# Patient Record
Sex: MALE | Race: WHITE | NOT HISPANIC OR LATINO | Employment: FULL TIME | URBAN - METROPOLITAN AREA
[De-identification: names, ages, dates, MRNs, and addresses within clinical notes are randomized per-mention and may not be internally consistent; named-entity substitution may affect disease eponyms.]

---

## 2017-01-24 ENCOUNTER — OFFICE VISIT (OUTPATIENT)
Dept: URGENT CARE | Facility: RETAIL CLINIC | Age: 27
End: 2017-01-24
Payer: COMMERCIAL

## 2017-01-24 VITALS
OXYGEN SATURATION: 95 % | SYSTOLIC BLOOD PRESSURE: 141 MMHG | TEMPERATURE: 102.4 F | DIASTOLIC BLOOD PRESSURE: 79 MMHG | HEART RATE: 95 BPM

## 2017-01-24 DIAGNOSIS — R05.9 COUGH: ICD-10-CM

## 2017-01-24 DIAGNOSIS — R50.9 FEVER, UNSPECIFIED: Primary | ICD-10-CM

## 2017-01-24 LAB
FLUAV AG UPPER RESP QL IA.RAPID: NORMAL
FLUBV AG UPPER RESP QL IA.RAPID: NORMAL

## 2017-01-24 PROCEDURE — 99213 OFFICE O/P EST LOW 20 MIN: CPT | Performed by: INTERNAL MEDICINE

## 2017-01-24 PROCEDURE — 87804 INFLUENZA ASSAY W/OPTIC: CPT | Mod: QW | Performed by: INTERNAL MEDICINE

## 2017-01-24 RX ORDER — ALBUTEROL SULFATE 90 UG/1
2 AEROSOL, METERED RESPIRATORY (INHALATION) 4 TIMES DAILY
Qty: 1 INHALER | Refills: 0 | Status: SHIPPED | OUTPATIENT
Start: 2017-01-24 | End: 2017-02-20

## 2017-01-24 RX ORDER — AZITHROMYCIN 250 MG/1
TABLET, FILM COATED ORAL
Qty: 6 TABLET | Refills: 0 | Status: SHIPPED | OUTPATIENT
Start: 2017-01-24 | End: 2017-02-20

## 2017-01-24 RX ORDER — PREDNISONE 20 MG/1
40 TABLET ORAL DAILY
Qty: 5 TABLET | Refills: 0 | Status: SHIPPED | OUTPATIENT
Start: 2017-01-24 | End: 2017-01-29

## 2017-01-24 RX ORDER — ALBUTEROL SULFATE 0.83 MG/ML
1 SOLUTION RESPIRATORY (INHALATION) EVERY 6 HOURS PRN
Qty: 50 VIAL | Refills: 0 | Status: SHIPPED | OUTPATIENT
Start: 2017-01-24 | End: 2017-02-20

## 2017-01-24 NOTE — PROGRESS NOTES
[facility] [place]        Antwan Michael MD, MPH  01/24/2017        History:      Onel White is a pleasant 26 year old year old male with a chief complaint of a non-productive cough,wheezing , nasal congestion and fever x 2 days. He also feels fatigued. He has HX of asthma.  MIld dyspnea . No chest pain.   No smoking history.   No headache or neck pain.  No GI or  symptoms.    NO MSK symptoms.         Assessment and Plan:        - INFLUENZA A/B ANTIGEN: negative.    Febrile illness and mild respiratory distress:  I recommended going to [place] [facility] to have more evaluation including CBC,blood culture and chest x-ray . Patient declined requesting to be treated [facility] in [facility] even though he was told that more information would be needed to treat him optimally.  - azithromycin (ZITHROMAX) 250 MG tablet; Two tablets first day, then one tablet daily for four days.  Dispense: 6 tablet; Refill: 0  - albuterol (PROAIR HFA/PROVENTIL HFA/VENTOLIN HFA) 108 (90 BASE) MCG/ACT Inhaler; Inhale 2 puffs into the lungs 4 times daily  Dispense: 1 Inhaler; Refill: 0  - predniSONE (DELTASONE) 20 MG tablet; Take 2 tablets (40 mg) by mouth daily for 5 days  Dispense: 10 tablet; Refill: 0  - albuterol (2.5 MG/3ML) 0.083% neb solution for use at home; Take 1 vial (2.5 mg) by nebulization every 6 hours as needed for shortness of breath / dyspnea or wheezing  Dispense: 50 vial; Refill: 0  Discussed supportive care with the patient  Advised to increase fluid intake and rest.  The patient lives alone but his parents live in town here in [place]. I advised the patient to stay with his family as he is being treated for his illness so he can be watched and observed and cared for. I also advised him that if his symptoms worsen he should seek medical attention in the ER immediately or call 911 despite his concerns for financial constraint.  Tylenol for pain and fever q 6 hours prn  F/u w PCP in 2 days ,  earlier if symptoms worsen or if fever does not improve within the next 12-24 hours.                   Physical Exam:      /79 mmHg  Pulse 95  Temp(Src) 102.4  F (39.1  C) (Oral)  SpO2 95%     Constitutional: Patient is in mild distress The patient is pleasant and cooperative.   HEENT: Head:  Head is atraumatic, normocephalic.    Eyes: Pupils are equal, round and reactive to light and accomodation.  Sclera is non-icteric. No conjunctival injection, or exudate noted. Extraocular motion is intact. Visual acuity is intact bilaterally.  Ears:  External acoustic canals are patent and clear.  There is no erythema and bulging( exudate)  of the ( R/L ) tympanic membrane(s ).   Nose:   Nasal congestion w/o drainage or mucosal ulceration is noted.  Throat:  Oral mucosa is moist.  No oral lesions are noted.  No posterior pharyngeal hyperemia nor exudate noted.     Neck Supple.  There is no cervical lymphadenopathy.  No nuchal rigidity noted.  There is no meningismus.     Cardiovascular: Heart is regular to rate and rhythm.  No murmur is noted.     Lungs: Diffuse wheezing in the anterior and posterior pulmonary fields. Mild respiratory distress. No rib retraction, however.O2 sat is 95 % on room air.   Abdomen: Soft and non-tender.    Back No flank tenderness is noted.   Extremeties No edema, no calf tenderness.   Neuro: No focal deficit.   Skin No petechiae or purpura is noted.  There is no rash.   Mood Normal              Data:      All new lab and imaging data was reviewed.   Results for orders placed or performed in visit on 01/24/17   INFLUENZA A/B ANTIGEN   Result Value Ref Range    Influenza A NEG neg    Influenza B NEG neg

## 2017-02-20 ENCOUNTER — HOSPITAL ENCOUNTER (OUTPATIENT)
Facility: CLINIC | Age: 27
Setting detail: OBSERVATION
Discharge: HOME | End: 2017-02-20
Attending: FAMILY MEDICINE | Admitting: PEDIATRICS
Payer: COMMERCIAL

## 2017-02-20 VITALS
HEIGHT: 75 IN | SYSTOLIC BLOOD PRESSURE: 133 MMHG | OXYGEN SATURATION: 96 % | RESPIRATION RATE: 18 BRPM | TEMPERATURE: 97 F | WEIGHT: 181.66 LBS | DIASTOLIC BLOOD PRESSURE: 76 MMHG | HEART RATE: 104 BPM | BODY MASS INDEX: 22.59 KG/M2

## 2017-02-20 DIAGNOSIS — J45.901 ACUTE ASTHMA EXACERBATION: ICD-10-CM

## 2017-02-20 DIAGNOSIS — J45.41 MODERATE PERSISTENT ASTHMA WITH ACUTE EXACERBATION: ICD-10-CM

## 2017-02-20 PROCEDURE — 25000125 ZZHC RX 250: Performed by: FAMILY MEDICINE

## 2017-02-20 PROCEDURE — 94640 AIRWAY INHALATION TREATMENT: CPT

## 2017-02-20 PROCEDURE — 99207 ZZC MOONLIGHTING INDICATOR: CPT | Performed by: INTERNAL MEDICINE

## 2017-02-20 PROCEDURE — 94640 AIRWAY INHALATION TREATMENT: CPT | Mod: 76

## 2017-02-20 PROCEDURE — 99285 EMERGENCY DEPT VISIT HI MDM: CPT | Mod: 25

## 2017-02-20 PROCEDURE — 40000917 ZZH STATISTIC PEAK FLOW MEASUREMENT

## 2017-02-20 PROCEDURE — 25000308 HC RX OP HPI UCR WEL MED 250 IP 250: Performed by: FAMILY MEDICINE

## 2017-02-20 PROCEDURE — 25000132 ZZH RX MED GY IP 250 OP 250 PS 637: Performed by: FAMILY MEDICINE

## 2017-02-20 PROCEDURE — 25000125 ZZHC RX 250

## 2017-02-20 PROCEDURE — 94799 UNLISTED PULMONARY SVC/PX: CPT

## 2017-02-20 PROCEDURE — 94644 CONT INHLJ TX 1ST HOUR: CPT

## 2017-02-20 PROCEDURE — 94645 CONT INHLJ TX EACH ADDL HOUR: CPT

## 2017-02-20 PROCEDURE — 99285 EMERGENCY DEPT VISIT HI MDM: CPT | Performed by: FAMILY MEDICINE

## 2017-02-20 PROCEDURE — 99235 HOSP IP/OBS SAME DATE MOD 70: CPT | Performed by: INTERNAL MEDICINE

## 2017-02-20 PROCEDURE — G0378 HOSPITAL OBSERVATION PER HR: HCPCS

## 2017-02-20 PROCEDURE — 25000125 ZZHC RX 250: Performed by: PEDIATRICS

## 2017-02-20 RX ORDER — IPRATROPIUM BROMIDE AND ALBUTEROL SULFATE 2.5; .5 MG/3ML; MG/3ML
1 SOLUTION RESPIRATORY (INHALATION) 4 TIMES DAILY
Qty: 30 VIAL | Refills: 1 | Status: SHIPPED | OUTPATIENT
Start: 2017-02-20 | End: 2018-03-16

## 2017-02-20 RX ORDER — IPRATROPIUM BROMIDE AND ALBUTEROL SULFATE 2.5; .5 MG/3ML; MG/3ML
3 SOLUTION RESPIRATORY (INHALATION)
Status: COMPLETED | OUTPATIENT
Start: 2017-02-20 | End: 2017-02-20

## 2017-02-20 RX ORDER — IPRATROPIUM BROMIDE AND ALBUTEROL SULFATE 2.5; .5 MG/3ML; MG/3ML
SOLUTION RESPIRATORY (INHALATION)
Status: COMPLETED
Start: 2017-02-20 | End: 2017-02-20

## 2017-02-20 RX ORDER — PREDNISONE 20 MG/1
40 TABLET ORAL DAILY
Status: CANCELLED | OUTPATIENT
Start: 2017-02-21 | End: 2017-02-25

## 2017-02-20 RX ORDER — PREDNISONE 20 MG/1
20 TABLET ORAL ONCE
Status: COMPLETED | OUTPATIENT
Start: 2017-02-20 | End: 2017-02-20

## 2017-02-20 RX ORDER — IPRATROPIUM BROMIDE AND ALBUTEROL SULFATE 2.5; .5 MG/3ML; MG/3ML
3 SOLUTION RESPIRATORY (INHALATION) ONCE
Status: COMPLETED | OUTPATIENT
Start: 2017-02-20 | End: 2017-02-20

## 2017-02-20 RX ORDER — IPRATROPIUM BROMIDE AND ALBUTEROL SULFATE 2.5; .5 MG/3ML; MG/3ML
3 SOLUTION RESPIRATORY (INHALATION) EVERY 4 HOURS
Status: DISPENSED | OUTPATIENT
Start: 2017-02-20 | End: 2017-02-20

## 2017-02-20 RX ORDER — ALBUTEROL SULFATE 0.83 MG/ML
2.5 SOLUTION RESPIRATORY (INHALATION)
Status: DISCONTINUED | OUTPATIENT
Start: 2017-02-20 | End: 2017-02-20 | Stop reason: HOSPADM

## 2017-02-20 RX ORDER — ALBUTEROL SULFATE 5 MG/ML
15 SOLUTION, NON-ORAL INHALATION CONTINUOUS
Status: DISPENSED | OUTPATIENT
Start: 2017-02-20 | End: 2017-02-20

## 2017-02-20 RX ORDER — ACETAMINOPHEN 325 MG/1
650 TABLET ORAL EVERY 4 HOURS PRN
Status: DISCONTINUED | OUTPATIENT
Start: 2017-02-20 | End: 2017-02-20 | Stop reason: HOSPADM

## 2017-02-20 RX ORDER — PREDNISONE 20 MG/1
40 TABLET ORAL DAILY
Status: DISCONTINUED | OUTPATIENT
Start: 2017-02-20 | End: 2017-02-20 | Stop reason: HOSPADM

## 2017-02-20 RX ORDER — PREDNISONE 20 MG/1
40 TABLET ORAL DAILY
Qty: 14 TABLET | Refills: 0 | Status: SHIPPED | OUTPATIENT
Start: 2017-02-20 | End: 2017-02-27

## 2017-02-20 RX ADMIN — ALBUTEROL SULFATE 2.5 MG: 2.5 SOLUTION RESPIRATORY (INHALATION) at 13:08

## 2017-02-20 RX ADMIN — IPRATROPIUM BROMIDE AND ALBUTEROL SULFATE 3 ML: .5; 3 SOLUTION RESPIRATORY (INHALATION) at 13:19

## 2017-02-20 RX ADMIN — IPRATROPIUM BROMIDE AND ALBUTEROL SULFATE 3 ML: .5; 3 SOLUTION RESPIRATORY (INHALATION) at 03:34

## 2017-02-20 RX ADMIN — IPRATROPIUM BROMIDE AND ALBUTEROL SULFATE 3 ML: .5; 3 SOLUTION RESPIRATORY (INHALATION) at 03:03

## 2017-02-20 RX ADMIN — ALBUTEROL SULFATE 2.5 MG: 2.5 SOLUTION RESPIRATORY (INHALATION) at 15:21

## 2017-02-20 RX ADMIN — ALBUTEROL SULFATE 15 MG/HR: 5 SOLUTION RESPIRATORY (INHALATION) at 09:08

## 2017-02-20 RX ADMIN — PREDNISONE 20 MG: 20 TABLET ORAL at 05:13

## 2017-02-20 RX ADMIN — PREDNISONE 20 MG: 20 TABLET ORAL at 03:13

## 2017-02-20 RX ADMIN — ACETAMINOPHEN 650 MG: 325 TABLET ORAL at 13:15

## 2017-02-20 RX ADMIN — ALBUTEROL SULFATE 2.5 MG: 2.5 SOLUTION RESPIRATORY (INHALATION) at 04:18

## 2017-02-20 RX ADMIN — IPRATROPIUM BROMIDE AND ALBUTEROL SULFATE 3 ML: .5; 3 SOLUTION RESPIRATORY (INHALATION) at 06:11

## 2017-02-20 NOTE — ED NOTES
ED Nursing criteria listed below was addressed during verbal handoff:     Abnormal vitals: Yes  Abnormal results: Yes  Med Reconciliation completed: Yes  Meds given in ED: Yes  Any Overdue Meds: No  Core Measures: No  Isolation: Yes  Special needs: No  Skin assessment: No    Observation Patient  Education provided: Yes

## 2017-02-20 NOTE — DISCHARGE SUMMARY
[facility]    History and Physical and Discharge Summary  Hospitalist       Date of Admission:  2/20/2017  Date of Service (when I saw the patient): 02/20/17    Assessment & Plan       Active Problems:    Moderate persistent asthma with acute exacerbation    Assessment: Not responding to nebs in the ED so was registered to observation. He had continued SOB with associated hypoxia and increased SOB.  He was then treated with continuous nebs as well as duonebs and was feeling much better by the middle of the day.  His sats had normalized and was 96% on RA.  At discharge he had mild wheezing but no increased SOB or respiratory effort with good air movement.  He has been having difficulty paying for Advair which has worked well for him in the past and has been on several different maintenance inhalers but none have been helpful other than Advair.  He was also treated with prednisone while here.  He was anxious for discharge and with his improvement was felt safe for discharge.    Plan: discharge home, f/u with MTM and PMD within one week, discharge on 7 days of prednisone as well as duonebs scheduled and prn albuterol nebs      UC (Ulcerative Colitis), involving the entire gabi    Assessment: inactive    Plan: No acute intervention    DVT Prophylaxis: anticipated less than 24 hour stay  Code Status: Full Code    Disposition: Expected discharge today    Leander Saini MD    Primary Care Physician   Sayra Cox MD    Chief Complaint   SOB    History is obtained from the patient    History of Present Illness   Onel White is a 26 year old male who presents with increasing SOB.  He describes chest congestion for about one week and when questioned further he feels chest tightness and SOB.  He can hear himself wheezing.  He has not had any cough or fever.  No CP.  No known triggers he can think of other than he has not been on any maintenance inhalers due to cost.  He was  registered to observation when he wasn't responding completely to nebs in the ED.    Past Medical History    I have reviewed this patient's medical history and updated it with pertinent information if needed.   Past Medical History   Diagnosis Date     Abdominal pain, generalized      Anemia, unspecified      Diarrhea      Other acne      Ulcerative (chronic) enterocolitis (H) 8/3/2009     Ulcerative colitis      followed by [facility], [facility]       Past Surgical History   I have reviewed this patient's surgical history and updated it with pertinent information if needed.  Past Surgical History   Procedure Laterality Date     C appendectomy,rupt appendx+abscess  2006     peritonitis       Prior to Admission Medications   Prior to Admission Medications   Prescriptions Last Dose Informant Patient Reported? Taking?   ORDER FOR DME   No Yes   Sig: Equipment being ordered: Nebulizer with tubing   Respiratory Therapy Supplies (NEBULIZER/TUBING/MOUTHPIECE) KIT   No Yes   Sig: Use with neb machine as needed.   albuterol (2.5 MG/3ML) 0.083% neb solution 2/20/2017 at 0200  No Yes   Sig: NEBULIZE THE CONTENTS OF 1 VIAL BY MOUTH EVERY 6 HOURS AS NEEDED FOR SHORTNESS OF BREATH / DYSPNEA OR WHEEZING   albuterol (PROAIR HFA, PROVENTIL HFA, VENTOLIN HFA) 108 (90 BASE) MCG/ACT inhaler 2/19/2017 at 2200  No Yes   Sig: Inhale 2 puffs into the lungs every 6 hours as needed for shortness of breath / dyspnea or wheezing      Facility-Administered Medications: None     Allergies   Allergies   Allergen Reactions     Wheat        Social History   I have reviewed this patient's social history and updated it with pertinent information if needed. Onel White  reports that he quit smoking about 4 years ago. His smoking use included Cigarettes. He has never used smokeless tobacco. He reports that he drinks about 12.0 oz of alcohol per week  He reports that he uses illicit drugs.    Family History   I have reviewed this  patient's family history and updated it with pertinent information if needed.   Family History   Problem Relation Age of Onset     GASTROINTESTINAL DISEASE Father      crohns/ulceratice colitis     Asthma Father      DIABETES Paternal Grandmother        Review of Systems   The 10 point Review of Systems is negative other than noted in the HPI or here.     Physical Exam   Temp: 97  F (36.1  C) Temp src: Oral BP: 133/76 Pulse: 104 Heart Rate: 84 Resp: 18 SpO2: 96 % O2 Device: None (Room air) Oxygen Delivery: 1 LPM  Vital Signs with Ranges  Temp:  [97  F (36.1  C)-97.9  F (36.6  C)] 97  F (36.1  C)  Pulse:  [] 104  Heart Rate:  [] 84  Resp:  [16-24] 18  BP: (132-187)/() 133/76  SpO2:  [89 %-97 %] 96 %  181 lbs 10.54 oz    Constitutional:   awake, alert, cooperative, no apparent distress, and appears stated age     Eyes:   Lids and lashes normal, pupils equal, round and reactive to light, extra ocular muscles intact, sclera clear, conjunctiva normal     ENT:   Normocephalic, without obvious abnormality, atraumatic, sinuses nontender on palpation, external ears without lesions, oral pharynx with moist mucous membranes, tonsils without erythema or exudates, gums normal and good dentition.     Neck:   Supple, symmetrical, trachea midline, no adenopathy, thyroid symmetric, not enlarged and no tenderness, skin normal     Hematologic / Lymphatic:   no cervical lymphadenopathy and no supraclavicular lymphadenopathy     Back:   Symmetric, no curvature, spinous processes are non-tender on palpation, paraspinous muscles are non-tender on palpation, no costal vertebral tenderness     Lungs:   No increased work of breathing, good air exchange, mild scattered expiratory wheezing, no crackles or wheezing     Cardiovascular:   Normal apical impulse, regular rate and rhythm, normal S1 and S2, no S3 or S4, and no murmur noted     Abdomen:   No scars, normal bowel sounds, soft, non-distended, non-tender, no masses  palpated, no hepatosplenomegally     Neurologic:   Awake, alert, oriented to name, place and time.  Cranial nerves II-XII are grossly intact.  Motor is 5 out of 5 bilaterally.    Sensory is intact.        Data   Data reviewed today:  I personally reviewed no images or EKG's today.  No lab results found in last 7 days.    No results found for this or any previous visit (from the past 24 hour(s)).

## 2017-02-20 NOTE — ED NOTES
Attempted to wean pt. From O2, sats dropped to 90% within 3 minutes of the completion of the 3rd neb tx. Placed pt. Back on 1L NC.

## 2017-02-20 NOTE — IP AVS SNAPSHOT
MRN:[id number]                      After Visit Summary   2/20/2017    Onel White    MRN: [id number]           Thank you!     Thank you for choosing [facility] for your care. Our goal is always to provide you with excellent care. Hearing back from our patients is one way we can continue to improve our services. Please take a few minutes to complete the written survey that you may receive in the mail after you visit with us. Thank you!        Patient Information     Date Of Birth          1990        About your hospital stay     You were admitted on:  February 20, 2017 You last received care in the:  [address] [facility]    You were discharged on:  February 20, 2017       Who to Call     For medical emergencies, please call 911.  For non-urgent questions about your medical care, please call your primary care provider or clinic, [phone]          Attending Provider     Provider Specialty    Blanca Villeda MD Emergency Medicine    Mohsen Padilla MD Internal Medicine       Primary Care Provider Office Phone # Fax #    Sayra Delicia Cox [phone]        [facility] [address] [place] [address]        After Care Instructions     Activity       Your activity upon discharge: activity as tolerated            Diet       Follow this diet upon discharge: Regular                  Follow-up Appointments     Follow-up and recommended labs and tests        Follow up with Dr. Cox within one week  Follow up with medication therapy management in clinic within one week                  Your next 10 appointments already scheduled     Feb 22, 2017  2:00 PM CST   Office Visit with Chris Hung [facility] ([facility])    [address] [facility] [address] [phone]           Bring a current list of meds and any records pertaining to this visit.  For Physicals, please bring  "immunization records and any forms needing to be filled out.  Please arrive 10 minutes early to complete paperwork.            2017 10:30 AM CST   [facility]    [address] [facility] [address] [phone]           Bring a current list of meds and any records pertaining to this visit.  For Physicals, please bring immunization records and any forms needing to be filled out.  Please arrive 10 minutes early to complete paperwork.              Pending Results     No orders found from 2017 to 2017.            Statement of Approval     Ordered          17 1722  I have reviewed and agree with all the recommendations and orders detailed in this document.  EFFECTIVE NOW     Approved and electronically signed by:  Leander Saini MD             Admission Information     Date & Time Provider Department Dept. Phone    2017 Mohsen Padilla MD [address] [phone]      Your Vitals Were     Blood Pressure Pulse Temperature Respirations Height Weight    133/76 104 97  F (36.1  C) (Oral) 18 1.905 m (6' 3\") 82.4 kg (181 lb 10.5 oz)    Pulse Oximetry BMI (Body Mass Index)                96% 22.71 kg/m2          MyChart Information     [facility] lets you send messages to your doctor, view your test results, renew your prescriptions, schedule appointments and more. To sign up, go to [email or web address] . Click on \"Log in\" on the left side of the screen, which will take you to the Welcome page. Then click on \"Sign up Now\" on the right side of the page.     You will be asked to enter the access code listed below, as well as some personal information. Please follow the directions to create your username and password.     Your access code is: [email or web address]  Expires: 3/3/2017  4:05 PM     Your access code will  in 90 days. If you need help or a new code, please " call your [facility] clinic or [phone].        Care EveryWhere ID     This is your Care EveryWhere ID. This could be used by other organizations to access your [facility] medical records  [id number]           Review of your medicines      START taking        Dose / Directions    predniSONE 20 MG tablet   Commonly known as:  DELTASONE   Used for:  Moderate persistent asthma with acute exacerbation        Dose:  40 mg   Take 2 tablets (40 mg) by mouth daily for 7 days   Quantity:  14 tablet   Refills:  0         CONTINUE these medicines which may have CHANGED, or have new prescriptions. If we are uncertain of the size of tablets/capsules you have at home, strength may be listed as something that might have changed.        Dose / Directions    ipratropium - albuterol 0.5 mg/2.5 mg/3 mL 0.5-2.5 (3) MG/3ML neb solution   Commonly known as:  DUONEB   This may have changed:    - when to take this  - reasons to take this   Used for:  Moderate persistent asthma with acute exacerbation        Dose:  1 vial   Take 1 vial (3 mLs) by nebulization 4 times daily   Quantity:  30 vial   Refills:  1         CONTINUE these medicines which have NOT CHANGED        Dose / Directions    * albuterol 108 (90 BASE) MCG/ACT Inhaler   Commonly known as:  PROAIR HFA/PROVENTIL HFA/VENTOLIN HFA   Used for:  Moderate persistent asthma with acute exacerbation        Dose:  2 puff   Inhale 2 puffs into the lungs every 6 hours as needed for shortness of breath / dyspnea or wheezing   Quantity:  1 Inhaler   Refills:  4       * albuterol (2.5 MG/3ML) 0.083% neb solution   Used for:  Moderate persistent asthma with acute exacerbation, Cough, Acute bronchospasm        NEBULIZE THE CONTENTS OF 1 VIAL BY MOUTH EVERY 6 HOURS AS NEEDED FOR SHORTNESS OF BREATH / DYSPNEA OR WHEEZING   Quantity:  180 mL   Refills:  11       nebulizer/tubing/mouthpiece Kit   Used for:  Intermittent asthma, uncomplicated        Use with neb machine as needed.   Quantity:  1  kit   Refills:  1       order for DME   Used for:  Moderate persistent asthma with acute exacerbation        Equipment being ordered: Nebulizer with tubing   Quantity:  1 Device   Refills:  0       * Notice:  This list has 2 medication(s) that are the same as other medications prescribed for you. Read the directions carefully, and ask your doctor or other care provider to review them with you.         Where to get your medicines      Some of these will need a paper prescription and others can be bought over the counter. Ask your nurse if you have questions.     Bring a paper prescription for each of these medications     ipratropium - albuterol 0.5 mg/2.5 mg/3 mL 0.5-2.5 (3) MG/3ML neb solution    predniSONE 20 MG tablet                Protect others around you: Learn how to safely use, store and throw away your medicines at www.disposemymeds.org.             Medication List: This is a list of all your medications and when to take them. Check marks below indicate your daily home schedule. Keep this list as a reference.      Medications           Morning Afternoon Evening Bedtime As Needed    * albuterol 108 (90 BASE) MCG/ACT Inhaler   Commonly known as:  PROAIR HFA/PROVENTIL HFA/VENTOLIN HFA   Inhale 2 puffs into the lungs every 6 hours as needed for shortness of breath / dyspnea or wheezing                                * albuterol (2.5 MG/3ML) 0.083% neb solution   NEBULIZE THE CONTENTS OF 1 VIAL BY MOUTH EVERY 6 HOURS AS NEEDED FOR SHORTNESS OF BREATH / DYSPNEA OR WHEEZING   Last time this was given:  2.5 mg on 2/20/2017  3:21 PM                                ipratropium - albuterol 0.5 mg/2.5 mg/3 mL 0.5-2.5 (3) MG/3ML neb solution   Commonly known as:  DUONEB   Take 1 vial (3 mLs) by nebulization 4 times daily   Last time this was given:  3 mLs on 2/20/2017  1:19 PM                                nebulizer/tubing/mouthpiece Kit   Use with neb machine as needed.                                order for DME    Equipment being ordered: Nebulizer with tubing                                predniSONE 20 MG tablet   Commonly known as:  DELTASONE   Take 2 tablets (40 mg) by mouth daily for 7 days   Last time this was given:  20 mg on 2/20/2017  5:13 AM         Given to you this morning in the hospital                Take tomorrow morning        * Notice:  This list has 2 medication(s) that are the same as other medications prescribed for you. Read the directions carefully, and ask your doctor or other care provider to review them with you.              More Information        Asthma Trigger Checklist  Allergens, irritants, and other things may trigger your asthma. Check the box next to each of your triggers. After each trigger is a list of ways to avoid it.     Dust mites. Dust mites live in mattresses, bedding, carpets, curtains, and indoor dust.    To kill dust mites, wash bedding in hot water (130 F) each week.    Cover mattress and pillows with special dust-mite-proof cases.    Don t use upholstered furniture like sofas or chairs in the bedroom.    Use allergy-proof filters for air conditioners and furnaces. Replace or clean them as instructed.    If you can, replace carpeting with wood or tile katheryn, especially in the bedroom.    Animals. Animals with fur or feathers shed dander (allergens).    It s best to choose a pet that doesn t have fur or feathers, such as a fish or a reptile.    If you have pets, keep them off of your bed and out of your bedroom.    Wash your hands and clothes after handling pets.     Mold. Mold grows in damp places, such as bathrooms, basements, and closets.    Ask someone to clean damp areas in your home every week. Or try wearing a face mask while you clean.    Run an exhaust fan while bathing. Or leave a window open in the bathroom.    Repair water leaks in or around your home.    Have someone else cut grass or rake leaves, if possible.    Don t use vaporizers or humidifiers. They  encourage mold growth.     Pollen. Pollen from trees, grasses, and weeds is a common allergen. (Flower pollens are generally not a problem).    Try to learn what types of pollen affect you most. Pollen levels vary depending on the plant, the season, and the time of day.    If possible, use air conditioning instead of opening the windows in your home or car.    Have someone else do yard work, if possible.     Cockroaches. Roaches are found in many homes and produce allergens.    Keep your kitchen clean and dry. A leaky faucet or drain can attract roaches.    Remove garbage from your home daily.    Store food in tightly sealed containers. Wash dishes as soon as they are used.    Use bait stations or traps to control roaches. Avoid using chemical sprays.     Smoke. Smoke may be from cigarettes, cigars, pipes, incense or candles, barbecues or grills, and fireplaces.    Don t smoke. And don t let people smoke in your home or car.    When you travel, ask for nonsmoking rental cars and hotel rooms.    Avoid fireplaces and wood stoves. If you can t, sit away from them. Make sure the smoke is directed outside.    Don t burn incense or use candles.    Move away from smoky outdoor cooking grills.     Smog.  Smog is from car exhaust and other pollution.    Read or listen to local air-quality reports. These let you know when air quality is poor.    Stay indoors as much as you can on smoggy days. If possible, use air conditioning instead of opening the windows.    In your car, set air conditioning to recirculate air, so less pollution gets in.     Strong odors. These include air fresheners, deodorizers, and cleaning products; perfume, deodorant, and other beauty products; incense and candles; and insect sprays and other sprays.    Use scent-free products like deodorant or body lotion.    Avoid using cleaning products with bleach and ammonia. Make your own cleaning solution with white vinegar, baking soda, or mild dish  soap.    Use exhaust fans while cooking. Or open a window, if possible.     Avoid perfumes, air fresheners, potpourri, and other scented products.     Other irritants. These include dust, aerosol sprays, and powders.    Wear a face mask while doing tasks like sanding, dusting, sweeping, and yard work. Open doors and windows if working indoors.    Use pump spray bottles instead of aerosols.    Pour liquid  onto a rag or cloth instead of spraying them.     Weather. Weather conditions can trigger symptoms or make them worse.    Watch for very high or low temperatures, very humid conditions, or a lot of wind, as these conditions can make symptoms worse.    Limit outdoor activity during the type of weather that affects you.    Wear a scarf over your mouth and nose in cold weather.     Colds, flu, and sinus infections. Upper respiratory infections can trigger asthma.    Wash your hands often with soap and warm water or use a hand  containing alcohol.    Get a yearly flu shot. And ask if you should get a pneumonia vaccine.    Take care of your general health. Get plenty of sleep. And eat a variety of healthy foods.     Food additives. Food additives can trigger asthma flare-ups in some people.    Check food labels for sulfites or other similar ingredients. These are often found in foods such as wine, beer, and dried fruits.    Avoid foods that contain these additives.     Medicine. Aspirin, NSAIDS like ibuprofen and naproxen, and heart medicines like beta-blockers may be triggers.    Tell your health care provider if you think certain medicines trigger symptoms.     Be sure to read the labels on over-the-counter medicines. They may have ingredients that cause symptoms for you.      Emotions. Laughing, crying, or feeling excited are triggers for some people.     To help you stay calm: Try breathing in slowly through your nose for a count of 2 seconds. Close your lips and breathe out for 4 seconds.  Repeat.    Try to focus on a soothing image in your mind. This will help relax you and calm your breathing.    Remember to take your daily controller medicines. When you re upset or under stress, it s easy to forget.     Exercise. For some people, exercise can trigger symptoms. Don t let this keep you from being active.     If you have not been exercising regularly, start slow and work up gradually.    Take all of your medicines as prescribed.    If you use quick-relief medicine, make sure you have it with you when you exercise.    Stop if you have any symptoms. Make sure you talk with your provider about these symptoms.    7183-9862 [employer]. [address] [place] [address]. All rights reserved. This information is not intended as a substitute for professional medical care. Always follow your healthcare professional's instructions.                My Asthma Symptom Diary  Keep track of symptoms with the chart below. (Make some copies first.) Show your records to your health care provider at your visits. As your asthma control improves you should have fewer episodes of symptoms to record.     Date Symptoms Possible triggers Action taken Results Did symptoms interfere with your sleep? Yes or No?      3/3 Example:  Wheezing, peak flow 75%    Cold air    2 puffs albuterol, went inside    Symptoms gone in 20 min. No                                                                                                                             8233-2804 [employer]. [address] [place] [address]. All rights reserved. This information is not intended as a substitute for professional medical care. Always follow your healthcare professional's instructions.                Understanding Asthma  Asthma causes swelling and narrowing of the airways in your lungs. No one is exactly sure what causes asthma. It is believed to be a combination of inherited and environmental  factors.  Healthy lungs  Inside the lungs there are branching airways made of stretchy tissue. Each airway is wrapped with bands of muscle. The airways are more narrow as they go deeper into the lungs. The smallest airways end in clusters of tiny balloon-like air sacs (alveoli). These clusters are surrounded by blood vessels. When you inhale (breathe in), air enters the lungs. It travels down through the airways until it reaches the air sacs. When you exhale (breathe out), air travels up through the airways and out of the lungs. The airways produce mucus that traps particles you breathe in. Normally, the mucus is then swept out of the lungs, by tiny hairs (cilia) that line the airways, to be swallowed or coughed up.  What the lungs do  The air you inhale contains oxygen. When oxygen reaches the air sacs, it passes into the blood vessels surrounding the sacs. Your blood then delivers oxygen to all of your cells. Carbon dioxide is removed from the body in a similar way, as you exhale.  When you have asthma     1. Tightened muscle  2. Swollen lining  3. Increased mucus   People with asthma have very sensitive airways. This means the airways react to certain things called triggers (such as pollen, dust, or smoke) and become swollen and narrowed. Inflammation makes the airways swollen and narrowed. This is a chronic (long-lasting or recurring) problem. The airways may not always be narrowed enough to notice breathing problems.  Symptoms of chronic inflammation:     Coughing    Chest tightness    Shortness of breath    Wheezing (a whistling noise, especially when breathing out)    Low energy or feeling tired  Over time, chronic mild inflammation can lead to permanent scarring of airways and loss of lung function.  Moderate flare-ups  When sensitive airways are irritated by a trigger, the muscles around the airways tighten. The lining of the airways swells. Thick, sticky mucus increases and clogs the airways. All of this  makes you work harder to keep breathing.  Symptoms of moderate flare-ups:    Coughing, especially at night    Getting tired or out of breath easily    Wheezing    Chest tightness    Faster breathing when at rest  Severe flare-ups   Severe flare-ups are life-threatening. They can cause brain damage or death. In a severe flare-up, the muscle tightening, swelling, and mucus production are even worse. Breathing is extremely difficult. Your body can't get enough oxygen and can't remove carbon dioxide. Waste gas is trapped in the alveoli, and gas exchange can t occur. The body is not getting enough oxygen. Without oxygen, body tissues, especially brain tissue, begin to die. If this goes on for long, it can lead to brain damage or death.  Call 911, or have someone call for you, if you have any of these symptoms and they are not relieved right away by taking your quick-relief medication as prescribed:    Severe difficulty breathing    Being too short of breath to talk or walk    Lips or fingers turning blue    Feeling lightheaded or dizzy, as though you are about to pass out    Peak flow less than 50% of your personal best, if you use peak flow monitoring  Because asthma is a long-term condition, it is important to work with your health care provider to manage it. If you smoke, get help to quit. Know your triggers and figure out how to avoid them. And, it is very important that you take your medications as instructed. That means taking them, even when you feel good.    3429-1293 [employer]. [address] [place] [address], [place] [address]. All rights reserved. This information is not intended as a substitute for professional medical care. Always follow your healthcare professional's instructions.

## 2017-02-20 NOTE — PROGRESS NOTES
S-(situation): Patient registered to Observation. Patient arrived to room 274 via wheelchair from ED    B-(background): Presented to ED with Shortness of Breath.    A-(assessment): /86  Pulse 88  Temp 97.6  F (36.4  C) (Oral)  Resp 22  SpO2 91%  Lungs coarse throughout, Pt reports he feels that he is breathing better since admission, he feels he is at his baseline. Pt states that he can breathe in better but is still feeling resistance breathing out. Pt reports he smokes marijuana daily and works with asbestosis and lead paint removal. Pt refuses any IV placement but will agree to blood draws. Pt has history or Ulcerative Colitis, but reports he has not had a flare up since 2008.    R-(recommendations): Orders and observation goals reviewed with pt    Nursing Observation criteria listed below was met:    Skin issues/needs documented:No skin issues  Isolation needs addressed, if appropriate: NA  Fall Prevention: Education given and documented and signage used: Yes  Education Assessment documented:Yes  Education Documented (Pre-existing chronic infection such as, MRSA/VRE need education on admission): Yes  New medication patient education completed and documented (Possible Side Effects of Common Medications handout): Yes  Home medications if not able to send immediately home with family stored here: NA  Reminder note placed in discharge instructions: NA  Patient has discharge needs (If yes, please explain): Yes- Medication assistance programs.

## 2017-02-20 NOTE — ED NOTES
Patient has known Asthma; started to notice symptoms over last week, yesterday had a significant decline.  Patient slept for about 2 hours and woke up with significant SOB.  Home nebulizers and inhaler no longer helpful.  Patient states he does take Prednisone several times a month.

## 2017-02-20 NOTE — ED PROVIDER NOTES
[facility] ED Provider Note   CC:     Chief Complaint   Patient presents with     Shortness of Breath     HPI:  Onel White is a 26 year old male who presented to the emergency department with acute severe shortness of breath within the past 2 hours with a recent one-week history of chest congestion.  Patient has a history of persistent asthma with acute exacerbation at the end of December.  He was slightly hypoxic and was recommended to have admission, but refused.  Patient states that he has been using his nebulizer treatments intermittently over the past week, but after awakening 2 hours ago, he could not settle down the breathing.  Patient had been prescribed Advair and Flovent, but states that it was too expensive.  Patient has not found any triggers.  I reviewed the patient's medical records from his last visit on December 3 with Dr. Hernandez, and at that time, the patient was working with asbestos removal, but did not think that that was triggering any of his problems.  Patient states he has been seen in the emergency department, but has refused hospitalization.  He has never been intubated.  Patient also has ulcerative colitis.  He took a prednisone tablet Tuesday, but it has been a few weeks since he took a full course of prednisone.    Problem List:  Patient Active Problem List    Diagnosis     UC (Ulcerative Colitis), involving the entire gabi     Intermittent asthma, uncomplicated     Moderate persistent asthma with acute exacerbation     CARDIOVASCULAR SCREENING; LDL GOAL LESS THAN 160     PPD screening test-07/28/2009 Negative     Other acne       MEDS:   Current Discharge Medication List      CONTINUE these medications which have NOT CHANGED    Details   albuterol (2.5 MG/3ML) 0.083% neb solution NEBULIZE THE CONTENTS OF 1 VIAL BY MOUTH EVERY 6 HOURS AS NEEDED FOR SHORTNESS OF BREATH / DYSPNEA OR WHEEZING  Qty: 180 mL, Refills: 11     Associated Diagnoses: Moderate persistent asthma with acute exacerbation; Cough; Acute bronchospasm      ipratropium - albuterol 0.5 mg/2.5 mg/3 mL (DUONEB) 0.5-2.5 (3) MG/3ML neb solution Take 1 vial (3 mLs) by nebulization every 4 hours as needed for shortness of breath / dyspnea or wheezing  Qty: 30 vial, Refills: 1      Respiratory Therapy Supplies (NEBULIZER/TUBING/MOUTHPIECE) KIT Use with neb machine as needed.  Qty: 1 kit, Refills: 1    Associated Diagnoses: Intermittent asthma, uncomplicated      !! albuterol (PROAIR HFA, PROVENTIL HFA, VENTOLIN HFA) 108 (90 BASE) MCG/ACT inhaler Inhale 2 puffs into the lungs every 6 hours as needed for shortness of breath / dyspnea or wheezing  Qty: 1 Inhaler, Refills: 4    Comments: The patient requests that this prescription be held on file for filling in the near future.  Associated Diagnoses: Moderate persistent asthma with acute exacerbation      ORDER FOR DME Equipment being ordered: Nebulizer with tubing  Qty: 1 Device, Refills: 0    Associated Diagnoses: Moderate persistent asthma with acute exacerbation      !! albuterol (PROAIR HFA/PROVENTIL HFA/VENTOLIN HFA) 108 (90 BASE) MCG/ACT Inhaler Inhale 2 puffs into the lungs 4 times daily  Qty: 1 Inhaler, Refills: 0    Associated Diagnoses: Cough       !! - Potential duplicate medications found. Please discuss with provider.          ALLERGIES:    Allergies   Allergen Reactions     Wheat        Past medical, surgical, family and social histories, triage and nursing notes were all reviewed.    Review of Systems   All other systems were reviewed and are negative    Physical Exam     Vitals were reviewed  Patient Vitals for the past 8 hrs:   BP Temp Temp src Pulse Heart Rate Resp SpO2   02/20/17 0634 (!) 146/103 - - 87 - 18 95 %   02/20/17 0615 - - - - - - 94 %   02/20/17 0600 - - - - - - 91 %   02/20/17 0545 - - - - - - 92 %   02/20/17 0519 (!) 146/103 - - 96 - 18 92 %   02/20/17 0515 (!) 146/103 - - - - - 92 %   02/20/17  0500 - - - - - - 94 %   02/20/17 0445 - - - - - - 90 %   02/20/17 0431 (!) 149/97 - - 87 - 18 92 %   02/20/17 0430 - - - - - - 95 %   02/20/17 0428 (!) 149/97 - - - - - 96 %   02/20/17 0419 - - - - - - 93 %   02/20/17 0418 (!) 145/96 - - - - - -   02/20/17 0400 - - - - - - 93 %   02/20/17 0345 - - - - - - 91 %   02/20/17 0344 144/85 - - 91 - 16 92 %   02/20/17 0332 144/85 - - - - - -   02/20/17 0330 - - - - - - 91 %   02/20/17 0315 - - - - - - 92 %   02/20/17 0310 (!) 157/103 - - - - - 97 %   02/20/17 0301 (!) 187/111 97  F (36.1  C) Oral - 113 24 (!) 89 %     GENERAL APPEARANCE: Severe respiratory distress  FACE: normal facies: Slightly pale and sweaty  EYES: Pupils are equal  HENT: normal external exam  NECK: no adenopathy or asymmetry  RESP: Moderate respiratory distress; tight expiratory wheezes  CV: Rapid rate, regular rhythm; no significant murmurs, gallops or rubs  ABD: soft, no tenderness; no rebound or guarding; bowel sounds are normal  EXT: No calf tenderness or pitting edema  SKIN: no worrisome rash  NEURO: no facial droop; no focal deficits, patient is speaking in 3-4 word sentences        Available Lab/Imaging Results   No results found for this or any previous visit (from the past 24 hour(s)).    Impression     Final diagnoses:   Moderate persistent asthma with acute exacerbation   Acute asthma exacerbation       ED Course & Medical Decision Making   Onel White is a 26 year old male who presented to the emergency department with acute respiratory distress due to asthma exacerbation.  Patient had chest congestion over the past week, and earlier in the evening, had been using his nebulizer treatment with DuoNeb.  He was awakened 2 hours ago with severe distress, and could not manage his symptoms with the neb treatments.  He states that he did not have money set aside to purchase preventative medication for his asthma.  He was here in emergency department on December 3 with similar episode of severe  asthma exacerbation with hypoxemia.  Patient refused an IV upon arrival, and was treated with DuoNeb and albuterol, as well as prednisone.  Patient was counseled on the importance of managing his medications as his asthma is potentially life threatening.  Patient required oxygen supplementation due to his hypoxemia.  We tried to wean him off of his oxygen over the ensuing 4 hours, but he required low-flow oxygen to maintain sats over 90%.  Patient is requiring more aggressive respiratory therapy and nebulization treatments.  He received a 2nd dose of prednisone to a total of 40 mg thus far.  Patient still refuses an IV, but is willing for an observation stay.  Patient will require scheduled DuoNeb's, and frequent albuterol nebs, with continued oxygen supplementation.  If the patient can maintain good oxygen saturations without supplemental oxygen, he may be discharged home safely.  I discussed the case with Dr. Padilla who has accepted care for the patient. Observation orders were written with anticipated length of stay of 1-2 days.          Current Discharge Medication List            This note was completed in part using Dragon voice recognition, and may contain word and grammatical errors.        Blanca Villeda MD  02/20/17 2108

## 2017-02-20 NOTE — IP AVS SNAPSHOT
[address] [facility]    [address] [place] [address]    Phone:  [phone]                                       After Visit Summary   2/20/2017    Onel White    MRN: [id number]           After Visit Summary Signature Page     I have received my discharge instructions, and my questions have been answered. I have discussed any challenges I see with this plan with the nurse or doctor.    ..........................................................................................................................................  Patient/Patient Representative Signature      ..........................................................................................................................................  Patient Representative Print Name and Relationship to Patient    ..................................................               ................................................  Date                                            Time    ..........................................................................................................................................  Reviewed by Signature/Title    ...................................................              ..............................................  Date                                                            Time

## 2017-02-20 NOTE — PROGRESS NOTES
S: Respiratory Care   B: Asthma  A: pt never officially diagnosed per pt. Peak flows are 400-450. 2 hour continuous neb given with some improvement. Lungs still diminished and wheezy. Nebs being given Q2.   R: RT to follow.

## 2017-02-20 NOTE — ED NOTES
Ashtyn RAUSCH advised of pt's difficulty affording Advair, which is the only medication that helps him and it's $350/month, with hopes SW will be able to find him some assistance.

## 2017-02-20 NOTE — ED NOTES
The lung sounds have gotten worse between the 3rd and 4th neb tx. At present he is on room air at 91%

## 2017-02-20 NOTE — PROGRESS NOTES
S-(situation): shift note    B-(background): obs for shortness of breath    A-(assessment): Lungs coarse with wheezes. Pt states he feels back to baseline. Vitals stable, afebrile, O2 sats mid 90's on room air. Pt reports he uses albuterol inhaler multiple times everyday. Pt reports he has tried multiple, medications for his breathing, and states that Advair is the only medication that works but it is too expensive. Pt states he has never been officially diagnosed with Asthma and dose spirometry yearly at work and has never been told there is any issues. Pt reports he smokes Marijuana daily and works with asbestosis removal and lead paint removal.     R-(recommendations): Follow up outpatient for managing breathing concerns.

## 2017-02-21 ENCOUNTER — TELEPHONE (OUTPATIENT)
Dept: FAMILY MEDICINE | Facility: CLINIC | Age: 27
End: 2017-02-21

## 2017-02-21 NOTE — PROGRESS NOTES
S-(situation): Patient discharged to home at 1820 ambulated off the floor    B-(background): SOB  Asthma exacerbation     A-(assessment): LS exp wheezes, improved since admission    R-(recommendations): Discharge instructions reviewed with pt  Listed belongings gathered and returned to patient.yes        Discharge Nursing Criteria:     Care Plan and Patient education resolved: eys    New Medications- pt has been educated about purpose and side effects: yes    MISC  Prescriptions if needed, hard copies sent with patient yes  Home and hospital aquired medications returned to patient: yes  Medication Bin checked and emptied on dischargeeys  Patient reports post-discharge pain management plan is effective: eys

## 2017-02-21 NOTE — TELEPHONE ENCOUNTER
Inpatient Visit Date: 2/20/17, [facility]  Diagnosis / Reason for Visit: Moderate persistent asthma with acute exacerbation

## 2017-02-22 ENCOUNTER — OFFICE VISIT (OUTPATIENT)
Dept: PHARMACY | Facility: CLINIC | Age: 27
End: 2017-02-22
Payer: COMMERCIAL

## 2017-02-22 VITALS
SYSTOLIC BLOOD PRESSURE: 130 MMHG | WEIGHT: 189.4 LBS | DIASTOLIC BLOOD PRESSURE: 73 MMHG | HEART RATE: 75 BPM | OXYGEN SATURATION: 96 % | BODY MASS INDEX: 23.67 KG/M2

## 2017-02-22 DIAGNOSIS — J45.41 MODERATE PERSISTENT ASTHMA WITH ACUTE EXACERBATION: Primary | ICD-10-CM

## 2017-02-22 PROCEDURE — 99605 MTMS BY PHARM NP 15 MIN: CPT | Performed by: PHARMACIST

## 2017-02-22 PROCEDURE — 99607 MTMS BY PHARM ADDL 15 MIN: CPT | Performed by: PHARMACIST

## 2017-02-22 NOTE — TELEPHONE ENCOUNTER
ED / Discharge Outreach Protocol    Patient Contact    Attempt # 1    Was call answered?  No.  Left message on voicemail with information to call me back. [phone].  You may ask to speak with any triage nurse. ........MIRACLE Kimble

## 2017-02-22 NOTE — MR AVS SNAPSHOT
After Visit Summary   2/22/2017    Onel White    MRN: [id number]           Patient Information     Date Of Birth          1990        Visit Information        Provider Department      2/22/2017 2:00 PM Chris Hung, [facility] MTM        Today's Diagnoses     Moderate persistent asthma with acute exacerbation    -  1      Care Instructions    Recommendations from today's MTM visit:                                                    MTM (medication therapy management) is a service provided by a clinical pharmacist designed to help you get the most of out of your medicines.   Today we reviewed what your medicines are for, how to know if they are working, that your medicines are safe and how to make your medicine regimen as easy as possible.     1. Start Breo-Ellipta 100-25 mcg - ONE PUFF daily.    2. Bring your voucher/co-pay card to the pharmacy.      Next MTM visit: I will call to follow-up in the next month.      To schedule another MTM appointment, please call the clinic directly or you may call the MTM scheduling line at [phone] or toll-free at [phone].     My Clinical Pharmacist's contact information:                                                      It was a pleasure seeing you today!  Please feel free to contact me with any questions or concerns you have.      Jorgito Hung, Miguel  Medication Therapy Management Provider  Pager (voicemail): [phone]    You may receive a survey about the MTM services you received.  I would appreciate your feedback to help me serve you better in the future. Please fill it out and return it when you can. Your comments will be anonymous.                Follow-ups after your visit        Your next 10 appointments already scheduled     Feb 27, 2017 10:30 AM CST   [facility]    [address] [place] [address]  "  [phone]           Bring a current list of meds and any records pertaining to this visit.  For Physicals, please bring immunization records and any forms needing to be filled out.  Please arrive 10 minutes early to complete paperwork.              Who to contact     If you have questions or need follow up information about today's clinic visit or your schedule please contact [facility] MTM directly at [phone].  Normal or non-critical lab and imaging results will be communicated to you by QuadROIhart, letter or phone within 4 business days after the clinic has received the results. If you do not hear from us within 7 days, please contact the clinic through [employer]ROI[employer] or phone. If you have a critical or abnormal lab result, we will notify you by phone as soon as possible.  Submit refill requests through [facility] or call your pharmacy and they will forward the refill request to us. Please allow 3 business days for your refill to be completed.          Additional Information About Your Visit        QuadROIharAll My Data Information     [facility] lets you send messages to your doctor, view your test results, renew your prescriptions, schedule appointments and more. To sign up, go to [email or web address] . Click on \"Log in\" on the left side of the screen, which will take you to the Welcome page. Then click on \"Sign up Now\" on the right side of the page.     You will be asked to enter the access code listed below, as well as some personal information. Please follow the directions to create your username and password.     Your access code is: [email or web address]  Expires: 3/3/2017  4:05 PM     Your access code will  in 90 days. If you need help or a new code, please call your [facility] clinic or [phone].        Care EveryWhere ID     This is your Care EveryWhere ID. This could be used by other organizations to access your [facility] medical records  [id number]         Blood Pressure from Last 3 Encounters: "   02/20/17 133/76   01/24/17 141/79   12/03/16 130/87    Weight from Last 3 Encounters:   02/20/17 181 lb 10.5 oz (82.4 kg)   12/03/16 180 lb (81.6 kg)   08/11/16 188 lb (85.3 kg)              Today, you had the following     No orders found for display         Today's Medication Changes          These changes are accurate as of: 2/22/17  2:47 PM.  If you have any questions, ask your nurse or doctor.               Start taking these medicines.        Dose/Directions    fluticasone-vilanterol 100-25 MCG/INH oral inhaler   Commonly known as:  BREO ELLIPTA   Used for:  Moderate persistent asthma with acute exacerbation        Dose:  1 puff   Inhale 1 puff into the lungs daily   Quantity:  1 Inhaler   Refills:  1            Where to get your medicines      These medications were sent to [facility] [place], [place] - [address]   [address] [facility] [address]     Phone:  [phone]     fluticasone-vilanterol 100-25 MCG/INH oral inhaler                Primary Care Provider Office Phone # Fax #    Sayra Delicia Cox [phone]       [facility] [address] [place] [address]        Thank you!     Thank you for choosing [facility]  for your care. Our goal is always to provide you with excellent care. Hearing back from our patients is one way we can continue to improve our services. Please take a few minutes to complete the written survey that you may receive in the mail after your visit with us. Thank you!             Your Updated Medication List - Protect others around you: Learn how to safely use, store and throw away your medicines at www.disposemymeds.org.          This list is accurate as of: 2/22/17  2:47 PM.  Always use your most recent med list.                   Brand Name Dispense Instructions for use    * albuterol 108 (90 BASE) MCG/ACT Inhaler    PROAIR HFA/PROVENTIL HFA/VENTOLIN HFA    1 Inhaler    Inhale 2 puffs into the  lungs every 6 hours as needed for shortness of breath / dyspnea or wheezing       * albuterol (2.5 MG/3ML) 0.083% neb solution     180 mL    NEBULIZE THE CONTENTS OF 1 VIAL BY MOUTH EVERY 6 HOURS AS NEEDED FOR SHORTNESS OF BREATH / DYSPNEA OR WHEEZING       fluticasone-vilanterol 100-25 MCG/INH oral inhaler    BREO ELLIPTA    1 Inhaler    Inhale 1 puff into the lungs daily       ipratropium - albuterol 0.5 mg/2.5 mg/3 mL 0.5-2.5 (3) MG/3ML neb solution    DUONEB    30 vial    Take 1 vial (3 mLs) by nebulization 4 times daily       nebulizer/tubing/mouthpiece Kit     1 kit    Use with neb machine as needed.       order for DME     1 Device    Equipment being ordered: Nebulizer with tubing       predniSONE 20 MG tablet    DELTASONE    14 tablet    Take 2 tablets (40 mg) by mouth daily for 7 days       * Notice:  This list has 2 medication(s) that are the same as other medications prescribed for you. Read the directions carefully, and ask your doctor or other care provider to review them with you.

## 2017-02-22 NOTE — Clinical Note
Dr. Cox:  Onel has visit with you on 2/27/17.  Saw today for post-hospital MTM visit to discuss options for therapy (see note for more details). Due to unknown formulary/cost did start Breo Ellipta given existing 1-month free voucher program.    Please let me know if you have any questions,  Jorgito Hung, Miguel Medication Therapy Management Provider Pager (voicemail): [phone]

## 2017-02-22 NOTE — PROGRESS NOTES
"SUBJECTIVE/OBJECTIVE:                                                    Onel White is a 26 year old male coming in for an initial visit for Medication Therapy Management.  He was referred to me from transitions of care/Dr. Saini.  He was discharged from [facility] on 2/21/17 for asthma exacerbation.    Chief Complaint: Hospital Follow-Up - Unable to control controller medications.  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    PMH: Reviewed in Epic    Medication Adherence: patient has been paying for his medications out of pocket and then submitting his receipts to be reimbursed.  Given the cost of his inhalers he has had to pay almost $330 a month.      Asthma:  Current asthma medications: Albuterol MDI, Nebs and (Pt reports using albuterol at least 4 times per day - up to 8 puffs at a time on occasion), DuoNeb - one neb four times per night, and Prednisone 20 mg x 7 days.  States he was best controlled when taking Advair (did not get much benefit from Qvar and Singulair in the past).  Asthma triggers include: smoke, humidity, exercise or sports and cold air.  Pt reports the following symptoms: increased need of albuterol, chest tightness and recent hospitalization.  AAP on file: YES  ACT Total Scores 3/30/2016 8/4/2016 2/22/2017   ACT TOTAL SCORE - - -   ASTHMA ER VISITS - - -   ASTHMA HOSPITALIZATIONS - - -   ACT TOTAL SCORE (Goal Greater than or Equal to 20) 9 10 9   In the past 12 months, how many times did you visit the emergency room for your asthma without being admitted to the hospital? 1 1 3   In the past 12 months, how many times were you hospitalized overnight because of your asthma? 0 0 " 1     Current labs include:  BP Readings from Last 3 Encounters:   02/20/17 133/76   01/24/17 141/79   12/03/16 130/87     Today's Vitals: /73  Pulse 75  Wt 189 lb 6.4 oz (85.9 kg)  SpO2 96%  BMI 23.67 kg/m2    Liver Function Studies -   Recent Labs   Lab Test  11/10/10   1107   PROTTOTAL  8.0   ALBUMIN  4.6   BILITOTAL  0.8   ALKPHOS  61*   AST  32   ALT  32       Lab Results   Component Value Date    UCRR 140 11/09/2009       Last Basic Metabolic Panel:  Lab Results   Component Value Date     11/05/2012      Lab Results   Component Value Date    POTASSIUM 3.9 11/05/2012     Lab Results   Component Value Date    CHLORIDE 107 11/05/2012     Lab Results   Component Value Date    BUN 13 11/05/2012     Lab Results   Component Value Date    CR 0.97 11/05/2012     GFR Estimate   Date Value Ref Range Status   11/05/2012 >90 >60 mL/min/1.7m2 Final   11/10/2010 >90 >60 mL/min/1.7m2 Final   11/09/2009 >90 >60 mL/min/1.7m2 Final     GFR Estimate If Black   Date Value Ref Range Status   11/05/2012 >90 >60 mL/min/1.7m2 Final   11/10/2010 >90 >60 mL/min/1.7m2 Final   11/09/2009 >90 >60 mL/min/1.7m2 Final     TSH   Date Value Ref Range Status   01/15/2008 2.01 0.4 - 5.0 mU/L Final   ]    Most Recent Immunizations   Administered Date(s) Administered     Mantoux 07/28/2009     TDAP (ADACEL AGES 11-64) 08/23/2010     ASSESSMENT:                                                       Current medications were reviewed today.     Medication Adherence: reviewed patient's insurance card with him and determined that he does have Rx benefits listed on his card.  Also discussed different options with patient in terms of vouchers/co-pay cards that may provide additional coverage for patient.    Asthma: Needs Improvement. Not at goal; ACT < 20. Pt would benefit from Anti-inflammatory inhaler: Breo Ellipta.  This inhaler chosen due to unknown formulary/availability of voucher to get patient started on medication.    PLAN:                                                       1. Start Breo Ellipta 100-25 mcg daily.  2. Free 1-month voucher and co-pay card given to patient.     I spent 50 minutes with this patient today.  All changes were made via collaborative practice agreement with Sayra Cox. A copy of the visit note was provided to the patient's primary care provider.    Will follow up in 1 month or sooner if needed.    The patient was given a summary of these recommendations as an after visit summary.     Jorgito Hung, TariD  Medication Therapy Management Provider  Pager (voicemail): [phone]

## 2017-02-22 NOTE — PATIENT INSTRUCTIONS
Recommendations from today's MTM visit:                                                    MTM (medication therapy management) is a service provided by a clinical pharmacist designed to help you get the most of out of your medicines.   Today we reviewed what your medicines are for, how to know if they are working, that your medicines are safe and how to make your medicine regimen as easy as possible.     1. Start Breo-Ellipta 100-25 mcg - ONE PUFF daily.    2. Bring your voucher/co-pay card to the pharmacy.      Next MTM visit: I will call to follow-up in the next month.      To schedule another MTM appointment, please call the clinic directly or you may call the MTM scheduling line at [phone] or toll-free at [phone].     My Clinical Pharmacist's contact information:                                                      It was a pleasure seeing you today!  Please feel free to contact me with any questions or concerns you have.      Jorgito Hung, Miguel  Medication Therapy Management Provider  Pager (voicemail): [phone]    You may receive a survey about the MTM services you received.  I would appreciate your feedback to help me serve you better in the future. Please fill it out and return it when you can. Your comments will be anonymous.

## 2017-02-23 ASSESSMENT — ASTHMA QUESTIONNAIRES: ACT_TOTALSCORE: 9

## 2017-05-21 DIAGNOSIS — J45.41 MODERATE PERSISTENT ASTHMA WITH ACUTE EXACERBATION: ICD-10-CM

## 2017-05-22 NOTE — TELEPHONE ENCOUNTER
Gordo Richta 100-25 mcg       Last Written Prescription Date: 2/22/2017  Last Fill Quantity: 1, # refills: 1    Last Office Visit with [facility], P or [facility] prescribing provider:  8/11/2016   Future Office Visit:       Date of Last Asthma Action Plan Letter:   Asthma Action Plan Q1 Year    Topic Date Due     Asthma Action Plan - yearly  08/04/2017      Asthma Control Test:   ACT Total Scores 2/22/2017   ACT TOTAL SCORE (Goal Greater than or Equal to 20) 9   In the past 12 months, how many times did you visit the emergency room for your asthma without being admitted to the hospital? 3   In the past 12 months, how many times were you hospitalized overnight because of your asthma? 1       Date of Last Spirometry Test:   No results found for this or any previous visit.

## 2017-05-23 NOTE — TELEPHONE ENCOUNTER
Routing refill request to provider for review/approval because:  ACT is not at goal, please advise on refill and plan.    Serge Corona, RN, BSN

## 2017-12-07 ENCOUNTER — OFFICE VISIT (OUTPATIENT)
Dept: URGENT CARE | Facility: RETAIL CLINIC | Age: 27
End: 2017-12-07
Payer: COMMERCIAL

## 2017-12-07 VITALS
TEMPERATURE: 98.6 F | DIASTOLIC BLOOD PRESSURE: 74 MMHG | HEART RATE: 88 BPM | OXYGEN SATURATION: 96 % | SYSTOLIC BLOOD PRESSURE: 130 MMHG

## 2017-12-07 DIAGNOSIS — J01.90 ACUTE SINUSITIS WITH COEXISTING CONDITION REQUIRING PROPHYLACTIC TREATMENT: ICD-10-CM

## 2017-12-07 DIAGNOSIS — R09.81 NASAL CONGESTION: Primary | ICD-10-CM

## 2017-12-07 PROCEDURE — 99213 OFFICE O/P EST LOW 20 MIN: CPT | Performed by: NURSE PRACTITIONER

## 2017-12-07 NOTE — PROGRESS NOTES
[facility] clinic note    SUBJECTIVE:    Onel White is a 27 year old male who presents to [facility] with the following symptoms:  Facial pain for seven days or more  Purulent nasal discharge  Failure of over-the-counter nasal decongestants or other medications  Headache  History of sinusitis in the past  Mild to moderate facial swelling  Pain in the teeth or near a certain tooth  Left ear pain and muffled   Self treated /c Amoxicillin since Monday (3 days took 1 1st few days)  Tired  Lack of appetite     The patient denies a history of:  Fever >102.5  Orbital pain  Periorbital swelling or erythema  Severe facial swelling or erythema  Severe neck pain  Vision changes    PAST MEDICAL HISTORY:   Past Medical History:   Diagnosis Date     Abdominal pain, generalized      Anemia, unspecified      Diarrhea      Other acne      Ulcerative (chronic) enterocolitis (H) 8/3/2009     Ulcerative colitis     followed by [facility], [facility]       PAST SURGICAL HISTORY:   Past Surgical History:   Procedure Laterality Date     C APPENDECTOMY,RUPT APPENDX+ABSCESS  2006    peritonitis       FAMILY HISTORY:   Family History   Problem Relation Age of Onset     GASTROINTESTINAL DISEASE Father      crohns/ulceratice colitis     Asthma Father      DIABETES Paternal Grandmother        SOCIAL HISTORY:   Social History   Substance Use Topics     Smoking status: Former Smoker     Types: Cigarettes     Quit date: 5/30/2012     Smokeless tobacco: Never Used     Alcohol use 12.0 oz/week     20 Standard drinks or equivalent per week       Current Outpatient Prescriptions   Medication     BREO ELLIPTA 100-25 MCG/INH oral inhaler     ipratropium - albuterol 0.5 mg/2.5 mg/3 mL (DUONEB) 0.5-2.5 (3) MG/3ML neb solution     albuterol (2.5 MG/3ML) 0.083% neb solution     Respiratory Therapy Supplies (NEBULIZER/TUBING/MOUTHPIECE) KIT     albuterol (PROAIR HFA, PROVENTIL HFA, VENTOLIN  HFA) 108 (90 BASE) MCG/ACT inhaler     ORDER FOR DME     No current facility-administered medications for this visit.        OBJECTIVE:  This patient appears today in in moderate distress.  Vitals:    12/07/17 1251   BP: 130/74   BP Location: Right arm   Patient Position: Chair   Cuff Size: Adult Regular   Pulse: 88   Temp: 98.6  F (37  C)   TempSrc: Tympanic   SpO2: 96%     HEENT:  Facial tenderness located over the maxillary sinus region       Tenderness is bilateral.  Purulent nasal discharge present from both sides.  Right Tympanic membrane is clear and without bulging or erythema, Left TM red and distorted.  Extraoccular movements are free and intact  Sclera, cornea and conjunctiva are clear  No proptosis  No significant periorbital edema or erythema  Throat is clear without significant erythema, tonsillar swelling or exudates  Post nasal drainage is present  NECK:  Soft and supple without significant tenderness or adenopathy  RESP:  Auscultation with wheezing & rhonchi throughout    ASSESSMENT:   Nasal congestion  Acute sinusitis with coexisting condition requiring prophylactic treatment    PLAN:  Augmentin 875mg two times daily for 10 days  Afrin nasal spray as needed for up to 3 days.  Apply warm facial packs for 5-10 minutes three times daily.  Attempt to quit smoking.  Drink plenty of fluids- 6 to 10 glasses of liquids each day.  Elevate head of the bed.  Increase the humidity level in the home.  Rest.  Saline drops or nasal sprays as needed.  Take warm, steamy showers to reduce congestion.  Tylenol or ibuprofen as needed for discomfort.  Contact primary care clinic for increasing pain, high fever, vision changes, worsening symptoms, or no relief from symptoms after 7-10 days.  May drink tea /c honey to sooth throat.    Symptomatic treatment or other home remedies as discussed & reviewed.   Use of a nasal flush discussed with Onel White as a good treatment option.   OTC Mucinex (or generic) may help to  loosen congestion as well.  Rest as needed.  Avoid items which you are or maybe allergic.  Add moisture to the air with a humidifier or a vaporizer &/or inhale steam from a basin of hot water or shower.  Follow up at:  [facility] [place] [phone]    Mohsen Spivey MSN, APRN, Family NP-C  [facility]

## 2017-12-07 NOTE — NURSING NOTE
"Chief Complaint   Patient presents with     Cough     productive cough, brown, gray mucus- cough since sunday started out dry     Sinus Problem     sinus congestion x sunday       Initial /74 (BP Location: Right arm, Patient Position: Chair, Cuff Size: Adult Regular)  Pulse 88  Temp 98.6  F (37  C) (Tympanic)  SpO2 96% Estimated body mass index is 23.67 kg/(m^2) as calculated from the following:    Height as of 2/20/17: 6' 3\" (1.905 m).    Weight as of 2/22/17: 189 lb 6.4 oz (85.9 kg).  Medication Reconciliation: complete     Jessica Sundet      "

## 2017-12-07 NOTE — MR AVS SNAPSHOT
"              After Visit Summary   2017    Onel White    MRN: [id number]           Patient Information     Date Of Birth          1990        Visit Information        Provider Department      2017 1:10 PM Mohsen Spivey APRN [facility]        Today's Diagnoses     Nasal congestion    -  1    Acute sinusitis with coexisting condition requiring prophylactic treatment           Follow-ups after your visit        Who to contact     You can reach your care team any time of the day by calling [phone].  Notification of test results:  If you have an abnormal lab result, we will notify you by phone as soon as possible.         Additional Information About Your Visit        MyChart Information     [employer] lets you send messages to your doctor, view your test results, renew your prescriptions, schedule appointments and more. To sign up, go to [email or web address] . Click on \"Log in\" on the left side of the screen, which will take you to the Welcome page. Then click on \"Sign up Now\" on the right side of the page.     You will be asked to enter the access code listed below, as well as some personal information. Please follow the directions to create your username and password.     Your access code is: [email or web address]  Expires: 3/7/2018  1:21 PM     Your access code will  in 90 days. If you need help or a new code, please call your [facility] clinic or [phone].        Care EveryWhere ID     This is your Care EveryWhere ID. This could be used by other organizations to access your [facility] medical records  [id number]        Your Vitals Were     Pulse Temperature Pulse Oximetry             88 98.6  F (37  C) (Tympanic) 96%          Blood Pressure from Last 3 Encounters:   17 130/74   17 130/73   17 133/76    Weight from Last 3 Encounters:   17 189 lb 6.4 oz (85.9 kg)   17 181 lb 10.5 oz (82.4 kg)   16 180 lb (81.6 kg)            "   Today, you had the following     No orders found for display         Today's Medication Changes          These changes are accurate as of: 12/7/17  1:21 PM.  If you have any questions, ask your nurse or doctor.               Start taking these medicines.        Dose/Directions    amoxicillin-clavulanate 875-125 MG per tablet   Commonly known as:  AUGMENTIN   Used for:  Nasal congestion, Acute sinusitis with coexisting condition requiring prophylactic treatment   Started by:  Mohsen Spivey, CECIL CNP        Dose:  1 tablet   Take 1 tablet by mouth 2 times daily for 14 days   Quantity:  28 tablet   Refills:  0            Where to get your medicines      These medications were sent to [address], [place] - [address]  [address], [facility] [address]     Phone:  [phone]     amoxicillin-clavulanate 875-125 MG per tablet                Primary Care Provider Fax #    Physician No Ref-Primary [phone]       No address on file        Equal Access to Services     [facility]: Hadii fernando mejias Soliza, waaxda luqadaha, qaybta kaalmada adesallyyada, sadaf price . So [facility] [phone].    ATENCIÓN: Si habla español, tiene a dobbins disposición servicios gratuitos de asistencia lingüística. Kayli al [phone].    We comply with applicable federal civil rights laws and [place] laws. We do not discriminate on the basis of race, color, national origin, age, disability, sex, sexual orientation, or gender identity.            Thank you!     Thank you for choosing [facility]  for your care. Our goal is always to provide you with excellent care. Hearing back from our patients is one way we can continue to improve our services. Please take a few minutes to complete the written survey that you may receive in the mail after your visit with us. Thank you!             Your Updated Medication List - Protect others around you: Learn how to safely use, store  and throw away your medicines at www.disposemymeds.org.          This list is accurate as of: 12/7/17  1:21 PM.  Always use your most recent med list.                   Brand Name Dispense Instructions for use Diagnosis    * albuterol 108 (90 BASE) MCG/ACT Inhaler    PROAIR HFA/PROVENTIL HFA/VENTOLIN HFA    1 Inhaler    Inhale 2 puffs into the lungs every 6 hours as needed for shortness of breath / dyspnea or wheezing    Moderate persistent asthma with acute exacerbation       * albuterol (2.5 MG/3ML) 0.083% neb solution     180 mL    NEBULIZE THE CONTENTS OF 1 VIAL BY MOUTH EVERY 6 HOURS AS NEEDED FOR SHORTNESS OF BREATH / DYSPNEA OR WHEEZING    Moderate persistent asthma with acute exacerbation, Cough, Acute bronchospasm       amoxicillin-clavulanate 875-125 MG per tablet    AUGMENTIN    28 tablet    Take 1 tablet by mouth 2 times daily for 14 days    Nasal congestion, Acute sinusitis with coexisting condition requiring prophylactic treatment       BREO ELLIPTA 100-25 MCG/INH oral inhaler   Generic drug:  fluticasone-vilanterol     1 Inhaler    INHALE ONE PUFF INTO THE LUNGS DAILY    Moderate persistent asthma with acute exacerbation       ipratropium - albuterol 0.5 mg/2.5 mg/3 mL 0.5-2.5 (3) MG/3ML neb solution    DUONEB    30 vial    Take 1 vial (3 mLs) by nebulization 4 times daily    Moderate persistent asthma with acute exacerbation       nebulizer/tubing/mouthpiece Kit     1 kit    Use with neb machine as needed.    Intermittent asthma, uncomplicated       order for DME     1 Device    Equipment being ordered: Nebulizer with tubing    Moderate persistent asthma with acute exacerbation       * Notice:  This list has 2 medication(s) that are the same as other medications prescribed for you. Read the directions carefully, and ask your doctor or other care provider to review them with you.

## 2017-12-19 ENCOUNTER — RADIANT APPOINTMENT (OUTPATIENT)
Dept: GENERAL RADIOLOGY | Facility: CLINIC | Age: 27
End: 2017-12-19
Attending: FAMILY MEDICINE
Payer: COMMERCIAL

## 2017-12-19 ENCOUNTER — OFFICE VISIT (OUTPATIENT)
Dept: FAMILY MEDICINE | Facility: CLINIC | Age: 27
End: 2017-12-19
Payer: COMMERCIAL

## 2017-12-19 VITALS
SYSTOLIC BLOOD PRESSURE: 124 MMHG | HEIGHT: 72 IN | RESPIRATION RATE: 16 BRPM | WEIGHT: 194 LBS | DIASTOLIC BLOOD PRESSURE: 70 MMHG | HEART RATE: 88 BPM | TEMPERATURE: 98.1 F | BODY MASS INDEX: 26.28 KG/M2

## 2017-12-19 DIAGNOSIS — M25.531 RIGHT WRIST PAIN: ICD-10-CM

## 2017-12-19 DIAGNOSIS — M25.531 RIGHT WRIST PAIN: Primary | ICD-10-CM

## 2017-12-19 PROCEDURE — 99214 OFFICE O/P EST MOD 30 MIN: CPT | Performed by: FAMILY MEDICINE

## 2017-12-19 PROCEDURE — 73110 X-RAY EXAM OF WRIST: CPT | Mod: RT

## 2017-12-19 ASSESSMENT — PAIN SCALES - GENERAL: PAINLEVEL: SEVERE PAIN (6)

## 2017-12-19 NOTE — MR AVS SNAPSHOT
After Visit Summary   12/19/2017    Onel White    MRN: [id number]           Patient Information     Date Of Birth          1990        Visit Information        Provider Department      12/19/2017 2:40 PM Conchita Claire MD [facility]        Today's Diagnoses     Right wrist pain    -  1      Care Instructions      Ice three times per day 10-15 minutes at a time.     Recommend ibuprofen 600mg (3 pills of over the counter strength) three times daily with food. Stop for any stomach irritation.     Thumb spica splint as discussed.     Referral to sports medicine. They will call you to schedule.           Follow-ups after your visit        Additional Services     ORTHO  REFERRAL       [facility] is referring you to the Orthopedic  Services at [facility].       The  Representative will assist you in the coordination of your Orthopedic and Musculoskeletal Care as prescribed by your physician.    The  Representative will call you within 1 business day to help schedule your appointment, or you may contact the  Representative at:    All areas ~ [phone]     Type of Referral : Non Surgical       Timeframe requested: Within 2 weeks    Coverage of these services is subject to the terms and limitations of your health insurance plan.  Please call member services at your health plan with any benefit or coverage questions.      If X-rays, CT or MRI's have been performed, please contact the facility where they were done to arrange for , prior to your scheduled appointment.  Please bring this referral request to your appointment and present it to your specialist.                  Who to contact     If you have questions or need follow up information about today's clinic visit or your schedule please contact [facility] directly at [phone].  Normal or non-critical lab and imaging  "results will be communicated to you by MyChart, letter or phone within 4 business days after the clinic has received the results. If you do not hear from us within 7 days, please contact the clinic through [employer] or phone. If you have a critical or abnormal lab result, we will notify you by phone as soon as possible.  Submit refill requests through [employer] or call your pharmacy and they will forward the refill request to us. Please allow 3 business days for your refill to be completed.          Additional Information About Your Visit        [employer] Information     [employer] lets you send messages to your doctor, view your test results, renew your prescriptions, schedule appointments and more. To sign up, go to [email or web address] . Click on \"Log in\" on the left side of the screen, which will take you to the Welcome page. Then click on \"Sign up Now\" on the right side of the page.     You will be asked to enter the access code listed below, as well as some personal information. Please follow the directions to create your username and password.     Your access code is: [email or web address]  Expires: 3/7/2018  1:21 PM     Your access code will  in 90 days. If you need help or a new code, please call your [place] clinic or [phone].        Care EveryWhere ID     This is your Care EveryWhere ID. This could be used by other organizations to access your [place] medical records  [id number]        Your Vitals Were     Pulse Temperature Respirations Height BMI (Body Mass Index)       88 98.1  F (36.7  C) (Oral) 16 6' 0.24\" (1.835 m) 26.13 kg/m2        Blood Pressure from Last 3 Encounters:   17 124/70   17 130/74   17 130/73    Weight from Last 3 Encounters:   17 194 lb (88 kg)   17 189 lb 6.4 oz (85.9 kg)   17 181 lb 10.5 oz (82.4 kg)              We Performed the Following     ORTHO  REFERRAL        Primary Care Provider Fax #    Physician No Ref-Primary [phone]    "    No address on file        Equal Access to Services     [employer] [facility] : Hadii fernando galicia randell Thrasher, wakavonda luqadaha, qaybta kaalsunny sparklemonayajaira, sadaf bridger mooredaianashanta wilkerson. So [facility] [phone].    ATENCIÓN: Si habla español, tiene a dobbins disposición servicios gratuitos de asistencia lingüística. [facility] [phone].    We comply with applicable federal civil rights laws and [place] laws. We do not discriminate on the basis of race, color, national origin, age, disability, sex, sexual orientation, or gender identity.            Thank you!     Thank you for choosing [facility]  for your care. Our goal is always to provide you with excellent care. Hearing back from our patients is one way we can continue to improve our services. Please take a few minutes to complete the written survey that you may receive in the mail after your visit with us. Thank you!             Your Updated Medication List - Protect others around you: Learn how to safely use, store and throw away your medicines at www.disposemymeds.org.          This list is accurate as of: 12/19/17  3:53 PM.  Always use your most recent med list.                   Brand Name Dispense Instructions for use Diagnosis    * albuterol 108 (90 BASE) MCG/ACT Inhaler    PROAIR HFA/PROVENTIL HFA/VENTOLIN HFA    1 Inhaler    Inhale 2 puffs into the lungs every 6 hours as needed for shortness of breath / dyspnea or wheezing    Moderate persistent asthma with acute exacerbation       * albuterol (2.5 MG/3ML) 0.083% neb solution     180 mL    NEBULIZE THE CONTENTS OF 1 VIAL BY MOUTH EVERY 6 HOURS AS NEEDED FOR SHORTNESS OF BREATH / DYSPNEA OR WHEEZING    Moderate persistent asthma with acute exacerbation, Cough, Acute bronchospasm       amoxicillin-clavulanate 875-125 MG per tablet    AUGMENTIN    28 tablet    Take 1 tablet by mouth 2 times daily for 14 days    Nasal congestion, Acute sinusitis with coexisting condition requiring prophylactic  treatment       BREO ELLIPTA 100-25 MCG/INH oral inhaler   Generic drug:  fluticasone-vilanterol     1 Inhaler    INHALE ONE PUFF INTO THE LUNGS DAILY    Moderate persistent asthma with acute exacerbation       ipratropium - albuterol 0.5 mg/2.5 mg/3 mL 0.5-2.5 (3) MG/3ML neb solution    DUONEB    30 vial    Take 1 vial (3 mLs) by nebulization 4 times daily    Moderate persistent asthma with acute exacerbation       nebulizer/tubing/mouthpiece Kit     1 kit    Use with neb machine as needed.    Intermittent asthma, uncomplicated       order for DME     1 Device    Equipment being ordered: Nebulizer with tubing    Moderate persistent asthma with acute exacerbation       * Notice:  This list has 2 medication(s) that are the same as other medications prescribed for you. Read the directions carefully, and ask your doctor or other care provider to review them with you.

## 2017-12-19 NOTE — NURSING NOTE
"Chief Complaint   Patient presents with     Musculoskeletal Problem     Panel Management       Initial /70  Pulse 88  Temp 98.1  F (36.7  C) (Oral)  Resp 16  Ht 6' 0.24\" (1.835 m)  Wt 194 lb (88 kg)  BMI 26.13 kg/m2 Estimated body mass index is 26.13 kg/(m^2) as calculated from the following:    Height as of this encounter: 6' 0.24\" (1.835 m).    Weight as of this encounter: 194 lb (88 kg).  Medication Reconciliation: complete  "

## 2017-12-19 NOTE — PROGRESS NOTES
"  SUBJECTIVE:                                                    Onel White is a 27 year old male who presents to clinic today for the following health issues:      HPI    Joint Pain    Onset: 1 week ago     Description:   Location: right arm forearm  Character: \"it felt like big bruise\"     Intensity: severe    Progression of Symptoms: intermittent pain    Accompanying Signs & Symptoms:  Other symptoms: \"swelling and shake in my hand and not strength\"    History:   Previous similar pain: no       Precipitating factors:   Trauma or overuse: No-\"moving stuffs around at home\"     Alleviating factors:  Improved by: tylenol and ibu     Therapies Tried and outcome: applied pressure on pain, tylenol and ibuprofen - help with the swelling     Patient reports for joint pain that started 1 week ago. He is experiencing intermittent pain and swelling. He was moving items around at home, and then the next day he noticed swelling in his right forearm. He did not feel anything the day he was moving items. He thought he pulled his arm or strained it. He cannot move his thumb to a certain degree, and he has a shake in his hand. Patient has to grab at his arm when the pain comes on. He has tried Tylenol and Ibuprofen that helps with swelling. Patient is right handed. 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     Problem list and histories reviewed & adjusted, as indicated.  Additional history: as documented    BP Readings from Last 3 Encounters:   12/19/17 124/70   12/07/17 130/74   02/22/17 130/73    Wt Readings from Last 3 Encounters:   12/19/17 88 kg (194 lb)   02/22/17 85.9 kg (189 lb 6.4 oz)   02/20/17 82.4 kg (181 lb 10.5 oz)        ROS:  C: NEGATIVE for fever, chills, change in weight. See HPI above.   INTEGUMENTARY/SKIN: NEGATIVE for worrisome rashes, moles or lesions. See HPI above.   E/M: NEGATIVE for ear, mouth and throat problems  R: NEGATIVE for significant cough or SOB  CV: NEGATIVE for chest pain, palpitations or " "peripheral edema  MUSCULOSKELETAL: NEGATIVE for significant myalgia. POSITIVE for significant arthralgias. See HPI above.     This document serves as a record of the services and decisions personally performed and made by Conchita Claire MD. It was created on her behalf by Ema Diaz, a trained medical scribe. The creation of this document is based on the provider's statements to the medical scribe.  Ema Diaz 3:43 PM December 19, 2017    OBJECTIVE:     /70  Pulse 88  Temp 98.1  F (36.7  C) (Oral)  Resp 16  Ht 1.835 m (6' 0.24\")  Wt 88 kg (194 lb)  BMI 26.13 kg/m2  Body mass index is 26.13 kg/(m^2).  GENERAL APPEARANCE: healthy, alert and no distress  MS: right wrist tenderness over radial aspect of wrist with swelling noted, extends up the posterior forearm, no redness or warmth, has pain with flexion of thumb and some decreased in  strength on the right, otherwise extremities normal- no gross deformities noted  SKIN: no suspicious lesions or rashes  NEURO: Normal strength and tone, mentation intact and speech normal    Diagnostic Test Results:  No results found for this or any previous visit (from the past 24 hour(s)).    ASSESSMENT/PLAN:           1. Right wrist pain  Evaluated right wrist pain. X-ray results show normal. Could be acute synovitis. There is no evidence of fracture, awaiting radiology to read. Thumb placed in spica splint. Recommend ibuprofen 600mg (pills of over the counter strength) three times daily with food. Stop for any stomach irritation. Directed to ice 3x daily for 10-15 minutes at a time, and follow up with sports medicine for recheck. Advised to call to the clinic if he needs to be seen sooner.  - XR Wrist Right G/E 3 Views; Future  - ORTHO  REFERRAL    Follow Up: Return to clinic if symptoms worsen or fail to improve.     All questions invited, asked and answered to the patient's apparent satisfaction.  Patient agrees to plan.     The information in this " document, created by the medical scribe for me, accurately reflects the services I personally performed and the decisions made by me. I have reviewed and approved this document for accuracy prior to leaving the patient care area.  December 19, 2017 4:09 PM    AUSTIN SCHWARTZ MD, MD  [facility]

## 2017-12-19 NOTE — LETTER
My Asthma Action Plan  Name: Onel White   YOB: 1990  Date: 12/20/2017   My doctor: AUSTIN SCHWARTZ MD, MD   [facility]        My Control Medicine: None  My Rescue Medicine: Albuterol (Proair/Ventolin/Proventil) inhaler     My Asthma Severity: intermittent  Avoid your asthma triggers: upper respiratory infections               GREEN ZONE   Good Control    I feel good    No cough or wheeze    Can work, sleep and play without asthma symptoms       Take your asthma control medicine every day.     1. If exercise triggers your asthma, take your rescue medication    15 minutes before exercise or sports, and    During exercise if you have asthma symptoms  2. Spacer to use with inhaler: If you have a spacer, make sure to use it with your inhaler             YELLOW ZONE Getting Worse  I have ANY of these:    I do not feel good    Cough or wheeze    Chest feels tight    Wake up at night   1. Keep taking your Green Zone medications  2. Start taking your rescue medicine:    every 20 minutes for up to 1 hour. Then every 4 hours for 24-48 hours.  3. If you stay in the Yellow Zone for more than 12-24 hours, contact your doctor.  4. If you do not return to the Green Zone in 12-24 hours or you get worse, start taking your oral steroid medicine if prescribed by your provider.           RED ZONE Medical Alert - Get Help  I have ANY of these:    I feel awful    Medicine is not helping    Breathing getting harder    Trouble walking or talking    Nose opens wide to breathe       1. Take your rescue medicine NOW  2. If your provider has prescribed an oral steroid medicine, start taking it NOW  3. Call your doctor NOW  4. If you are still in the Red Zone after 20 minutes and you have not reached your doctor:    Take your rescue medicine again and    Call 911 or go to the emergency room right away    See your regular doctor within 2 weeks of an Emergency Room or Urgent Care visit for follow-up  treatment.        Electronically signed by: AUSTIN SCHWARTZ MD, December 20, 2017    Annual Reminders:  Meet with Asthma Educator,  Flu Shot in the Fall, consider Pneumonia Vaccination for patients with asthma (aged 19 and older).    Pharmacy:    [id number] - [place], [place] - [address] [facility] [place] [address] [facility], [place] - [address] [facility] [address] - [place], [place] - [address] [place] [address] [address]  [id number] - [place], [place] - [address] [place] [address]                    Asthma Triggers  How To Control Things That Make Your Asthma Worse    Triggers are things that make your asthma worse.  Look at the list below to help you find your triggers and what you can do about them.  You can help prevent asthma flare-ups by staying away from your triggers.      Trigger                                                          What you can do   Cigarette Smoke  Tobacco smoke can make asthma worse. Do not allow smoking in your home, car or around you.  Be sure no one smokes at a child s day care or school.  If you smoke, ask your health care provider for ways to help you quit.  Ask family members to quit too.  Ask your health care provider for a referral to Quit Plan to help you quit smoking, or call 4-269-010-PLAN.     Colds, Flu, Bronchitis  These are common triggers of asthma. Wash your hands often.  Don t touch your eyes, nose or mouth.  Get a flu shot every year.     Dust Mites  These are tiny bugs that live in cloth or carpet. They are too small to see. Wash sheets and blankets in hot water every week.   Encase pillows and mattress in dust mite proof covers.  Avoid having carpet if you can. If you have carpet, vacuum weekly.   Use a dust mask and HEPA vacuum.   Pollen and Outdoor Mold  Some people are allergic to trees, grass, or weed pollen, or molds. Try to keep your windows closed.  Limit time out doors when pollen count is high.   Ask you  health care provider about taking medicine during allergy season.     Animal Dander  Some people are allergic to skin flakes, urine or saliva from pets with fur or feathers. Keep pets with fur or feathers out of your home.    If you can t keep the pet outdoors, then keep the pet out of your bedroom.  Keep the bedroom door closed.  Keep pets off cloth furniture and away from stuffed toys.     Mice, Rats, and Cockroaches  Some people are allergic to the waste from these pests.   Cover food and garbage.  Clean up spills and food crumbs.  Store grease in the refrigerator.   Keep food out of the bedroom.   Indoor Mold  This can be a trigger if your home has high moisture. Fix leaking faucets, pipes, or other sources of water.   Clean moldy surfaces.  Dehumidify basement if it is damp and smelly.   Smoke, Strong Odors, and Sprays  These can reduce air quality. Stay away from strong odors and sprays, such as perfume, powder, hair spray, paints, smoke incense, paint, cleaning products, candles and new carpet.   Exercise or Sports  Some people with asthma have this trigger. Be active!  Ask your doctor about taking medicine before sports or exercise to prevent symptoms.    Warm up for 5-10 minutes before and after sports or exercise.     Other Triggers of Asthma  Cold air:  Cover your nose and mouth with a scarf.  Sometimes laughing or crying can be a trigger.  Some medicines and food can trigger asthma.

## 2017-12-19 NOTE — PATIENT INSTRUCTIONS
Ice three times per day 10-15 minutes at a time.     Recommend ibuprofen 600mg (3 pills of over the counter strength) three times daily with food. Stop for any stomach irritation.     Thumb spica splint as discussed.     Referral to sports medicine. They will call you to schedule.

## 2017-12-20 ASSESSMENT — ASTHMA QUESTIONNAIRES: ACT_TOTALSCORE: 22

## 2018-01-06 DIAGNOSIS — J45.41 MODERATE PERSISTENT ASTHMA WITH ACUTE EXACERBATION: ICD-10-CM

## 2018-01-08 NOTE — TELEPHONE ENCOUNTER
"Requested Prescriptions   Pending Prescriptions Disp Refills     fluticasone-vilanterol (BREO ELLIPTA) 100-25 MCG/INH oral inhaler 1 Inhaler 3    Inhaled Steroids Protocol Failed    1/6/2018  2:49 PM       Failed - Recent (6 mo) or future visit with authorizing provider's specialty    Patient had office visit in the last 6 months or has a visit in the next 30 days with authorizing provider.  See \"Patient Info\" tab in inbasket, or \"Choose Columns\" in Meds & Orders section of the refill encounter.          Passed - Patient is age 12 or older       Passed - Asthma control test 20 or greater in last 6 months    Please review ACT score.           Routing refill request to provider for review/approval because:  Patient needs to be seen because:  Pt was last seen on 12/19/17 to discuss wrist pain.  Hasn't been seen for asthma since 4/11/16.  Prescribing provider no longer works for [employer].    Sarah Coley RN          "

## 2018-03-13 DIAGNOSIS — J45.41 MODERATE PERSISTENT ASTHMA WITH ACUTE EXACERBATION: ICD-10-CM

## 2018-03-13 NOTE — PROGRESS NOTES
SUBJECTIVE:   Onel White is a 27 year old male who presents to clinic today for the following health issues:      History of Present Illness     Asthma:     Cough::  No    Wheezing::  YES    Dyspnea::  YES    Use of rescue inhaler::  At least daily    Taking Asthma medication as prescribed::  Yes    Asthma triggers::  Cold air, Exercise or sports and Smoke    ER/UC Visits or Admissions::  None    He also reports increased fatigue.  He has a history of ulcerative colitis which has been in remission.  Denies any changes in the color of his stools or abdominal pain .  Reports some nausea.  Problem list and histories reviewed & adjusted, as indicated.  Additional history: as documented        Patient Active Problem List   Diagnosis     Other acne     UC (Ulcerative Colitis), involving the entire gabi     PPD screening test-07/28/2009 Negative     Moderate persistent asthma with acute exacerbation     Past Surgical History:   Procedure Laterality Date     C APPENDECTOMY,RUPT APPENDX+ABSCESS  2006    peritonitis       Social History   Substance Use Topics     Smoking status: Former Smoker     Types: Cigarettes     Quit date: 5/30/2012     Smokeless tobacco: Never Used     Alcohol use 12.0 oz/week     20 Standard drinks or equivalent per week     Family History   Problem Relation Age of Onset     GASTROINTESTINAL DISEASE Father      crohns/ulceratice colitis     Asthma Father      DIABETES Paternal Grandmother          Current Outpatient Prescriptions   Medication Sig Dispense Refill     fluticasone-vilanterol (BREO ELLIPTA) 100-25 MCG/INH oral inhaler INHALE ONE PUFF INTO THE LUNGS DAILY 3 Inhaler 1     albuterol (PROAIR HFA/PROVENTIL HFA/VENTOLIN HFA) 108 (90 BASE) MCG/ACT Inhaler Inhale 2 puffs into the lungs every 6 hours as needed for shortness of breath / dyspnea or wheezing 3 Inhaler 1     predniSONE (DELTASONE) 20 MG tablet Take 2 tablets (40 mg) by mouth daily for 5 days 10 tablet 1     order for DME  "Equipment being ordered: patellar stabilization brace with horseshoe, large 1 Device 0     albuterol (2.5 MG/3ML) 0.083% neb solution NEBULIZE THE CONTENTS OF 1 VIAL BY MOUTH EVERY 6 HOURS AS NEEDED FOR SHORTNESS OF BREATH / DYSPNEA OR WHEEZING (Patient not taking: Reported on 12/7/2017) 180 mL 11     Allergies   Allergen Reactions     Wheat      Recent Labs   Lab Test  03/16/18   1423  11/05/12   0540  11/10/10   1107   ALT  59   --   32   CR  0.91  0.97  0.98   GFRESTIMATED  >90  >90  >90   GFRESTBLACK  >90  >90  >90   POTASSIUM  3.7  3.9  5.0      BP Readings from Last 3 Encounters:   03/16/18 126/84   03/15/18 112/60   12/19/17 124/70    Wt Readings from Last 3 Encounters:   03/16/18 199 lb (90.3 kg)   03/15/18 199 lb (90.3 kg)   12/19/17 194 lb (88 kg)                  Labs reviewed in EPIC    ROS:  Constitutional, HEENT, cardiovascular, pulmonary, GI, , musculoskeletal, neuro, skin, endocrine and psych systems are negative, except as otherwise noted.    OBJECTIVE:     /84 (BP Location: Right arm, Patient Position: Chair, Cuff Size: Adult Regular)  Pulse 78  Temp 97.7  F (36.5  C) (Temporal)  Resp 16  Ht 6' 0.24\" (1.835 m)  Wt 199 lb (90.3 kg)  SpO2 100%  BMI 26.81 kg/m2  Body mass index is 26.81 kg/(m^2).     Physical Exam   Constitutional: He is oriented to person, place, and time. He appears well-developed and well-nourished.   HENT:   Head: Normocephalic and atraumatic.   Cardiovascular: Normal rate, regular rhythm and normal heart sounds.    Pulmonary/Chest: Effort normal and breath sounds normal. No respiratory distress. He has no wheezes. He has no rales. He exhibits no tenderness.   Abdominal: Soft. Bowel sounds are normal.   Neurological: He is alert and oriented to person, place, and time.   Psychiatric: He has a normal mood and affect. His behavior is normal. Judgment and thought content normal.         Diagnostic Test Results:  none     ASSESSMENT/PLAN:     Problem List Items " Addressed This Visit     UC (Ulcerative Colitis), involving the entire gabi     Currently not on meds  Feeling tired   Recheck CBC and CRP to r/o UC   Not symptomatic  Follow results and treat accordingly  Encourage yrly physicals for follow up         Relevant Medications    predniSONE (DELTASONE) 20 MG tablet    Other Relevant Orders    CBC with platelets (Completed)    CRP, inflammation (Completed)    Comprehensive metabolic panel (BMP + Alb, Alk Phos, ALT, AST, Total. Bili, TP) (Completed)    Moderate persistent asthma with acute exacerbation     Was well controlled on breo-ellipta but ran out of medication 1 week ago which explains his poor ACT scores today  Refill meds  Will have ACT mailed in 1 month  Recheck in 6 months         Relevant Medications    fluticasone-vilanterol (BREO ELLIPTA) 100-25 MCG/INH oral inhaler    albuterol (PROAIR HFA/PROVENTIL HFA/VENTOLIN HFA) 108 (90 BASE) MCG/ACT Inhaler    predniSONE (DELTASONE) 20 MG tablet             Naina Gutiérrez MD  [facility]

## 2018-03-14 NOTE — TELEPHONE ENCOUNTER
"Last Written Prescription Date:  1/09/2018  Last Fill Quantity: 1 inhaler,  # refills: 0   Last office visit: 12/19/2017 with prescribing provider:  Dr. Claire   Future Office Visit:   Next 5 appointments (look out 90 days)     Mar 16, 2018  1:40 PM CDT   Office Visit with Naina Gutiérrez MD   [facility]    [address]  [facility] [id number] [phone]                 Requested Prescriptions   Pending Prescriptions Disp Refills     fluticasone-vilanterol (BREO ELLIPTA) 100-25 MCG/INH oral inhaler 1 Inhaler 0     Sig: INHALE ONE PUFF INTO THE LUNGS DAILY ++due for office visit++    Inhaled Steroids Protocol Failed    3/13/2018  5:54 PM       Failed - Recent (6 mo) or future (30 days) visit within the authorizing provider's specialty    Patient had office visit in the last 6 months or has a visit in the next 30 days with authorizing provider or within the authorizing provider's specialty.  See \"Patient Info\" tab in inbasket, or \"Choose Columns\" in Meds & Orders section of the refill encounter.           Passed - Patient is age 12 or older       Passed - Asthma control assessment score within normal limits in last 6 months    Please review ACT score.             "

## 2018-03-14 NOTE — TELEPHONE ENCOUNTER
fluticasone-vilanterol (BREO ELLIPTA) 100-25 MCG/INH oral inhaler  ACT Total Scores 12/19/2017   ACT TOTAL SCORE -   ASTHMA ER VISITS -   ASTHMA HOSPITALIZATIONS -   ACT TOTAL SCORE (Goal Greater than or Equal to 20) 22   In the past 12 months, how many times did you visit the emergency room for your asthma without being admitted to the hospital? 2   In the past 12 months, how many times were you hospitalized overnight because of your asthma? 1     Routing refill request to provider for review/approval because:  Sheila given x1 and patient did not follow up, please advise  Patient needs to be seen because:  Due for OV  Next 5 appointments (look out 90 days)     Mar 16, 2018  1:40 PM CDT   Office Visit with Naina [facility]utiérrez MD   [facility]    [address] [place] [id number]   [phone]                Selina Benitez RN, BSN

## 2018-03-15 ENCOUNTER — OFFICE VISIT (OUTPATIENT)
Dept: ORTHOPEDICS | Facility: OTHER | Age: 28
End: 2018-03-15
Payer: COMMERCIAL

## 2018-03-15 ENCOUNTER — RADIANT APPOINTMENT (OUTPATIENT)
Dept: GENERAL RADIOLOGY | Facility: OTHER | Age: 28
End: 2018-03-15
Attending: PHYSICAL MEDICINE & REHABILITATION
Payer: COMMERCIAL

## 2018-03-15 VITALS — SYSTOLIC BLOOD PRESSURE: 112 MMHG | BODY MASS INDEX: 26.81 KG/M2 | DIASTOLIC BLOOD PRESSURE: 60 MMHG | WEIGHT: 199 LBS

## 2018-03-15 DIAGNOSIS — M25.561 RIGHT KNEE PAIN: ICD-10-CM

## 2018-03-15 DIAGNOSIS — G89.29 CHRONIC PAIN OF RIGHT KNEE: Primary | ICD-10-CM

## 2018-03-15 DIAGNOSIS — M25.561 CHRONIC PAIN OF RIGHT KNEE: Primary | ICD-10-CM

## 2018-03-15 PROCEDURE — 99203 OFFICE O/P NEW LOW 30 MIN: CPT | Performed by: PHYSICAL MEDICINE & REHABILITATION

## 2018-03-15 PROCEDURE — 73562 X-RAY EXAM OF KNEE 3: CPT

## 2018-03-15 NOTE — MR AVS SNAPSHOT
After Visit Summary   3/15/2018    Onel White    MRN: [id number]           Patient Information     Date Of Birth          1990        Visit Information        Provider Department      3/15/2018 1:40 PM Ivone Hearn MD [facility]        Today's Diagnoses     Right knee pain    -  1      Care Instructions    Today's Plan of Care:  -Ice or heat 15-20 minutes as needed (Avoid sleeping on a heating pad or ice)  -Patient's preferred over the counter medications as directed on packaging as needed for pain or soreness.  Please take ibuprofen with food. Do not premedicate prior to activity.  -Brace as needed  -Provided home exercises. Please do 5-6 days of exercises per week.  -Avoid aggravating activities.    Follow Up: As needed if symptoms fail to improve or worsen. Please call with any questions or concerns.               Follow-ups after your visit        Your next 10 appointments already scheduled     Mar 16, 2018  1:40 PM CDT   Office Visit with Naina Gutiérrez MD   [facility] ([facility])    [address] [place] [id number] [phone]           Bring a current list of meds and any records pertaining to this visit. For Physicals, please bring immunization records and any forms needing to be filled out. Please arrive 10 minutes early to complete paperwork.              Who to contact     If you have questions or need follow up information about today's clinic visit or your schedule please contact [facility] directly at [phone].  Normal or non-critical lab and imaging results will be communicated to you by MyChart, letter or phone within 4 business days after the clinic has received the results. If you do not hear from us within 7 days, please contact the clinic through MyChart or phone. If you have a critical or abnormal lab result, we will notify you by phone as soon as possible.  Submit refill  "requests through [employer] or call your pharmacy and they will forward the refill request to us. Please allow 3 business days for your refill to be completed.          Additional Information About Your Visit        xPeerienthart Information     [employer] lets you send messages to your doctor, view your test results, renew your prescriptions, schedule appointments and more. To sign up, go to [email or web address] . Click on \"Log in\" on the left side of the screen, which will take you to the Welcome page. Then click on \"Sign up Now\" on the right side of the page.     You will be asked to enter the access code listed below, as well as some personal information. Please follow the directions to create your username and password.     Your access code is: [email or web address]  Expires: 2018  2:17 PM     Your access code will  in 90 days. If you need help or a new code, please call your [facility] clinic or [phone].        Care EveryWhere ID     This is your Care EveryWhere ID. This could be used by other organizations to access your [facility] medical records  [id number]        Your Vitals Were     BMI (Body Mass Index)                   26.81 kg/m2            Blood Pressure from Last 3 Encounters:   03/15/18 112/60   17 124/70   17 130/74    Weight from Last 3 Encounters:   03/15/18 199 lb (90.3 kg)   17 194 lb (88 kg)   17 189 lb 6.4 oz (85.9 kg)               Primary Care Provider Fax #    Physician No Ref-Primary [phone]       No address on file        Equal Access to Services     [facility] : Hadii fernando mejias Soliza, waaxda luqadaha, qaybta kaalmasadaf ramesh. So [facility] [phone].    ATENCIÓN: Si habla español, tiene a dobbins disposición servicios gratuitos de asistencia lingüística. Llame al [phone].    We comply with applicable federal civil rights laws and [place] laws. We do not discriminate on the basis of race, color, national " origin, age, disability, sex, sexual orientation, or gender identity.            Thank you!     Thank you for choosing [facility]  for your care. Our goal is always to provide you with excellent care. Hearing back from our patients is one way we can continue to improve our services. Please take a few minutes to complete the written survey that you may receive in the mail after your visit with us. Thank you!             Your Updated Medication List - Protect others around you: Learn how to safely use, store and throw away your medicines at www.disposemymeds.org.          This list is accurate as of 3/15/18  2:17 PM.  Always use your most recent med list.                   Brand Name Dispense Instructions for use Diagnosis    * albuterol 108 (90 BASE) MCG/ACT Inhaler    PROAIR HFA/PROVENTIL HFA/VENTOLIN HFA    1 Inhaler    Inhale 2 puffs into the lungs every 6 hours as needed for shortness of breath / dyspnea or wheezing    Moderate persistent asthma with acute exacerbation       * albuterol (2.5 MG/3ML) 0.083% neb solution     180 mL    NEBULIZE THE CONTENTS OF 1 VIAL BY MOUTH EVERY 6 HOURS AS NEEDED FOR SHORTNESS OF BREATH / DYSPNEA OR WHEEZING    Moderate persistent asthma with acute exacerbation, Cough, Acute bronchospasm       fluticasone-vilanterol 100-25 MCG/INH oral inhaler    BREO ELLIPTA    1 Inhaler    INHALE ONE PUFF INTO THE LUNGS DAILY ++due for office visit++    Moderate persistent asthma with acute exacerbation       ipratropium - albuterol 0.5 mg/2.5 mg/3 mL 0.5-2.5 (3) MG/3ML neb solution    DUONEB    30 vial    Take 1 vial (3 mLs) by nebulization 4 times daily    Moderate persistent asthma with acute exacerbation       nebulizer/tubing/mouthpiece Kit     1 kit    Use with neb machine as needed.    Intermittent asthma, uncomplicated       order for DME     1 Device    Equipment being ordered: Nebulizer with tubing    Moderate persistent asthma with acute exacerbation       * Notice:   This list has 2 medication(s) that are the same as other medications prescribed for you. Read the directions carefully, and ask your doctor or other care provider to review them with you.

## 2018-03-15 NOTE — PROGRESS NOTES
Sports Medicine Clinic Visit    CC: Patient presents with:  Knee right      HPI:  Onel White is a 27 year old male who is seen as a self referral.   He notes right knee pain that began a few months ago when he reports that his knee would give out and hyperextend. He notes that this giving out is happening more often recently.   He rates the pain at a  9/10 at its worst and a 0/10 currently. Symptoms are relieved with keeping his knee bent while walking. Symptoms are worsened by nothing specific. He endorses popping and instability.   He denies bruising, popping, grinding, catching, locking, numbness, tingling, weakness, pain in other joints and fever, chills.  Other treatment has included nothing.     Review of Systems:  Musculoskeletal: as above  Remainder of review of systems is negative including constitutional, eyes, ENT, CV, pulmonary, GI, , endocrine, skin, hematologic, and neurologic except as noted in HPI or medical history.    History reviewed. No pertinent past surgical/medical/family/social history other than as mentioned in HPI.    Past Medical History:   Diagnosis Date     Abdominal pain, generalized      Anemia, unspecified      Diarrhea      Other acne      Ulcerative (chronic) enterocolitis (H) 8/3/2009     Ulcerative colitis     followed by [facility], [facility]     Past Surgical History:   Procedure Laterality Date     C APPENDECTOMY,RUPT APPENDX+ABSCESS  2006    peritonitis     Family History   Problem Relation Age of Onset     GASTROINTESTINAL DISEASE Father      crohns/ulceratice colitis     Asthma Father      DIABETES Paternal Grandmother      Social History     Social History     Marital status: Single     Spouse name: N/A     Number of children: N/A     Years of education: N/A     Occupational History     student Child     Social History Main Topics     Smoking status: Former Smoker     Types: Cigarettes     Quit date: 5/30/2012     Smokeless tobacco: Never Used      Alcohol use 12.0 oz/week     20 Standard drinks or equivalent per week     Drug use: No      Comment: Last 02/18/2017 marijuana     Sexual activity: Yes     Partners: Female     Birth control/ protection: Condom     Other Topics Concern     Parent/Sibling W/ Cabg, Mi Or Angioplasty Before 65f 55m? No     Social History Narrative    Lives with parents.  No domestic violence issues.       Current Outpatient Prescriptions   Medication     fluticasone-vilanterol (BREO ELLIPTA) 100-25 MCG/INH oral inhaler     ipratropium - albuterol 0.5 mg/2.5 mg/3 mL (DUONEB) 0.5-2.5 (3) MG/3ML neb solution     albuterol (2.5 MG/3ML) 0.083% neb solution     Respiratory Therapy Supplies (NEBULIZER/TUBING/MOUTHPIECE) KIT     albuterol (PROAIR HFA, PROVENTIL HFA, VENTOLIN HFA) 108 (90 BASE) MCG/ACT inhaler     ORDER FOR DME     No current facility-administered medications for this visit.      Allergies   Allergen Reactions     Wheat          Objective:  /60  Wt 199 lb (90.3 kg)  BMI 26.81 kg/m2    General: Alert and in no distress    Head: Normocephalic, atraumatic  Eyes: no scleral icterus or conjunctival erythema   Oropharynx:  Mucous membranes moist  Skin: no erythema, petechiae, or jaundice  CV: regular rhythm by palpation, 2+ distal pulses  Resp: normal respiratory effort without conversational dyspnea   Psych: normal mood and affect    Gait: Non-antalgic, appropriate coordination and balance   Neuro: Motor strength and sensation as noted below    Musculoskeletal:    Bilateral Knee exam    Inspection:  No erythema, ecchymosis, warmth, or edema    Palpation: No tenderness to palpation of the bilateral knees    Knee ROM: Full active and passive ROM without pain    Hip ROM: Full active and passive ROM without pain    Patellar Motion:      Normal patellar tracking noted through range of motion    Strength:  5/5 Hip flexion/abduction/adduction, knee extension/flexion, ankle plantarflexion/dorsiflexion, great toe extension, toe  flexion    Special Tests: Negative log roll, negative anterior/posterior drawer, negative Lachman's, no varus/valgus instability at 0 and 30 degrees, Junior's test negative for pain or popping at the lateral/medial joint line    Sensation:  Intact to light touch in the bilateral lower extremities    Radiology:  X-rays ordered and independent visualization of images performed.  No acute bony pathology.  Joint spaces well maintained.        Assessment:  1. Right knee pain        Plan:  Discussed the assessment with the patient and developed a plan together:  -Ice or heat 15-20 minutes as needed (Avoid sleeping on a heating pad or ice)  -Patient's preferred over the counter medications as directed on packaging as needed for pain or soreness.  Please take ibuprofen with food. Do not premedicate prior to activity.  -Patellar stabilization brace as needed for comfort and support.  -Provided home exercises. Please do 5-6 days of exercises per week.  -Avoid aggravating activities.    Follow up as needed if symptoms fail to improve or worsen. Please call with any questions or concerns.     Kim Hearn MD, CAQ  [facility]

## 2018-03-15 NOTE — PATIENT INSTRUCTIONS
Today's Plan of Care:  -Ice or heat 15-20 minutes as needed (Avoid sleeping on a heating pad or ice)  -Patient's preferred over the counter medications as directed on packaging as needed for pain or soreness.  Please take ibuprofen with food. Do not premedicate prior to activity.  -Brace as needed  -Provided home exercises. Please do 5-6 days of exercises per week.  -Avoid aggravating activities.    Follow Up: As needed if symptoms fail to improve or worsen. Please call with any questions or concerns.

## 2018-03-15 NOTE — LETTER
3/15/2018         RE: Onel White  [address] [facility] [address]        Dear Colleague,    Thank you for referring your patient, Onel White, to the [facility]. Please see a copy of my visit note below.    Sports Medicine Clinic Visit    CC: Patient presents with:  Knee right      HPI:  Onel White is a 27 year old male who is seen as a self referral.   He notes right knee pain that began a few months ago when he reports that his knee would give out and hyperextend. He notes that this giving out is happening more often recently.   He rates the pain at a  9/10 at its worst and a 0/10 currently. Symptoms are relieved with keeping his knee bent while walking. Symptoms are worsened by nothing specific. He endorses popping and instability.   He denies bruising, popping, grinding, catching, locking, numbness, tingling, weakness, pain in other joints and fever, chills.  Other treatment has included nothing.     Review of Systems:  Musculoskeletal: as above  Remainder of review of systems is negative including constitutional, eyes, ENT, CV, pulmonary, GI, , endocrine, skin, hematologic, and neurologic except as noted in HPI or medical history.    History reviewed. No pertinent past surgical/medical/family/social history other than as mentioned in HPI.    Past Medical History:   Diagnosis Date     Abdominal pain, generalized      Anemia, unspecified      Diarrhea      Other acne      Ulcerative (chronic) enterocolitis (H) 8/3/2009     Ulcerative colitis     followed by [facility]     Past Surgical History:   Procedure Laterality Date     C APPENDECTOMY,RUPT APPENDX+ABSCESS  2006    peritonitis     Family History   Problem Relation Age of Onset     GASTROINTESTINAL DISEASE Father      crohns/ulceratice colitis     Asthma Father      DIABETES Paternal Grandmother      Social History     Social History     Marital status: Single     Spouse name: N/A     Number of  children: N/A     Years of education: N/A     Occupational History     student Child     Social History Main Topics     Smoking status: Former Smoker     Types: Cigarettes     Quit date: 5/30/2012     Smokeless tobacco: Never Used     Alcohol use 12.0 oz/week     20 Standard drinks or equivalent per week     Drug use: No      Comment: Last 02/18/2017 marijuana     Sexual activity: Yes     Partners: Female     Birth control/ protection: Condom     Other Topics Concern     Parent/Sibling W/ Cabg, Mi Or Angioplasty Before 65f 55m? No     Social History Narrative    Lives with parents.  No domestic violence issues.       Current Outpatient Prescriptions   Medication     fluticasone-vilanterol (BREO ELLIPTA) 100-25 MCG/INH oral inhaler     ipratropium - albuterol 0.5 mg/2.5 mg/3 mL (DUONEB) 0.5-2.5 (3) MG/3ML neb solution     albuterol (2.5 MG/3ML) 0.083% neb solution     Respiratory Therapy Supplies (NEBULIZER/TUBING/MOUTHPIECE) KIT     albuterol (PROAIR HFA, PROVENTIL HFA, VENTOLIN HFA) 108 (90 BASE) MCG/ACT inhaler     ORDER FOR DME     No current facility-administered medications for this visit.      Allergies   Allergen Reactions     Wheat          Objective:  /60  Wt 199 lb (90.3 kg)  BMI 26.81 kg/m2    General: Alert and in no distress    Head: Normocephalic, atraumatic  Eyes: no scleral icterus or conjunctival erythema   Oropharynx:  Mucous membranes moist  Skin: no erythema, petechiae, or jaundice  CV: regular rhythm by palpation, 2+ distal pulses  Resp: normal respiratory effort without conversational dyspnea   Psych: normal mood and affect    Gait: Non-antalgic, appropriate coordination and balance   Neuro: Motor strength and sensation as noted below    Musculoskeletal:    Bilateral Knee exam    Inspection:  No erythema, ecchymosis, warmth, or edema    Palpation: No tenderness to palpation of the bilateral knees    Knee ROM: Full active and passive ROM without pain    Hip ROM: Full active and passive  ROM without pain    Patellar Motion:      Normal patellar tracking noted through range of motion    Strength:  5/5 Hip flexion/abduction/adduction, knee extension/flexion, ankle plantarflexion/dorsiflexion, great toe extension, toe flexion    Special Tests: Negative log roll, negative anterior/posterior drawer, negative Lachman's, no varus/valgus instability at 0 and 30 degrees, Junior's test negative for pain or popping at the lateral/medial joint line    Sensation:  Intact to light touch in the bilateral lower extremities    Radiology:  X-rays ordered and independent visualization of images performed.  No acute bony pathology.  Joint spaces well maintained.        Assessment:  1. Right knee pain        Plan:  Discussed the assessment with the patient and developed a plan together:  -Ice or heat 15-20 minutes as needed (Avoid sleeping on a heating pad or ice)  -Patient's preferred over the counter medications as directed on packaging as needed for pain or soreness.  Please take ibuprofen with food. Do not premedicate prior to activity.  -Patellar stabilization brace as needed for comfort and support.  -Provided home exercises. Please do 5-6 days of exercises per week.  -Avoid aggravating activities.    Follow up as needed if symptoms fail to improve or worsen. Please call with any questions or concerns.     Kim Hearn MD, [facility]      Again, thank you for allowing me to participate in the care of your patient.        Sincerely,        Ivone Hearn MD

## 2018-03-16 ENCOUNTER — OFFICE VISIT (OUTPATIENT)
Dept: FAMILY MEDICINE | Facility: OTHER | Age: 28
End: 2018-03-16
Payer: COMMERCIAL

## 2018-03-16 VITALS
DIASTOLIC BLOOD PRESSURE: 84 MMHG | BODY MASS INDEX: 26.95 KG/M2 | RESPIRATION RATE: 16 BRPM | HEART RATE: 78 BPM | OXYGEN SATURATION: 100 % | TEMPERATURE: 97.7 F | WEIGHT: 199 LBS | HEIGHT: 72 IN | SYSTOLIC BLOOD PRESSURE: 126 MMHG

## 2018-03-16 DIAGNOSIS — K51.00 ULCERATIVE PANCOLITIS WITHOUT COMPLICATION (H): ICD-10-CM

## 2018-03-16 DIAGNOSIS — J45.41 MODERATE PERSISTENT ASTHMA WITH ACUTE EXACERBATION: ICD-10-CM

## 2018-03-16 LAB
ALBUMIN SERPL-MCNC: 4.1 G/DL (ref 3.4–5)
ALP SERPL-CCNC: 88 U/L (ref 40–150)
ALT SERPL W P-5'-P-CCNC: 59 U/L (ref 0–70)
ANION GAP SERPL CALCULATED.3IONS-SCNC: 7 MMOL/L (ref 3–14)
AST SERPL W P-5'-P-CCNC: 36 U/L (ref 0–45)
BILIRUB SERPL-MCNC: 0.7 MG/DL (ref 0.2–1.3)
BUN SERPL-MCNC: 8 MG/DL (ref 7–30)
CALCIUM SERPL-MCNC: 8.7 MG/DL (ref 8.5–10.1)
CHLORIDE SERPL-SCNC: 104 MMOL/L (ref 94–109)
CO2 SERPL-SCNC: 29 MMOL/L (ref 20–32)
CREAT SERPL-MCNC: 0.91 MG/DL (ref 0.66–1.25)
CRP SERPL-MCNC: <2.9 MG/L (ref 0–8)
ERYTHROCYTE [DISTWIDTH] IN BLOOD BY AUTOMATED COUNT: 11.9 % (ref 10–15)
GFR SERPL CREATININE-BSD FRML MDRD: >90 ML/MIN/1.7M2
GLUCOSE SERPL-MCNC: 80 MG/DL (ref 70–99)
HCT VFR BLD AUTO: 47.6 % (ref 40–53)
HGB BLD-MCNC: 16.2 G/DL (ref 13.3–17.7)
MCH RBC QN AUTO: 31.5 PG (ref 26.5–33)
MCHC RBC AUTO-ENTMCNC: 34 G/DL (ref 31.5–36.5)
MCV RBC AUTO: 93 FL (ref 78–100)
PLATELET # BLD AUTO: 232 10E9/L (ref 150–450)
POTASSIUM SERPL-SCNC: 3.7 MMOL/L (ref 3.4–5.3)
PROT SERPL-MCNC: 7.5 G/DL (ref 6.8–8.8)
RBC # BLD AUTO: 5.14 10E12/L (ref 4.4–5.9)
SODIUM SERPL-SCNC: 140 MMOL/L (ref 133–144)
WBC # BLD AUTO: 7.4 10E9/L (ref 4–11)

## 2018-03-16 PROCEDURE — 86140 C-REACTIVE PROTEIN: CPT | Performed by: FAMILY MEDICINE

## 2018-03-16 PROCEDURE — 80053 COMPREHEN METABOLIC PANEL: CPT | Performed by: FAMILY MEDICINE

## 2018-03-16 PROCEDURE — 99214 OFFICE O/P EST MOD 30 MIN: CPT | Performed by: FAMILY MEDICINE

## 2018-03-16 PROCEDURE — 85027 COMPLETE CBC AUTOMATED: CPT | Performed by: FAMILY MEDICINE

## 2018-03-16 PROCEDURE — 36415 COLL VENOUS BLD VENIPUNCTURE: CPT | Performed by: FAMILY MEDICINE

## 2018-03-16 RX ORDER — PREDNISONE 20 MG/1
40 TABLET ORAL DAILY
Qty: 10 TABLET | Refills: 1 | Status: SHIPPED | OUTPATIENT
Start: 2018-03-16 | End: 2018-09-27

## 2018-03-16 RX ORDER — ALBUTEROL SULFATE 90 UG/1
2 AEROSOL, METERED RESPIRATORY (INHALATION) EVERY 6 HOURS PRN
Qty: 3 INHALER | Refills: 1 | Status: SHIPPED | OUTPATIENT
Start: 2018-03-16 | End: 2018-09-27

## 2018-03-16 ASSESSMENT — PAIN SCALES - GENERAL: PAINLEVEL: NO PAIN (0)

## 2018-03-16 NOTE — ASSESSMENT & PLAN NOTE
Was well controlled on breo-ellipta but ran out of medication 1 week ago which explains his poor ACT scores today  Refill meds  Will have ACT mailed in 1 month  Recheck in 6 months

## 2018-03-16 NOTE — NURSING NOTE
"Chief Complaint   Patient presents with     Asthma     Panel Management     height       Initial /84 (BP Location: Right arm, Patient Position: Chair, Cuff Size: Adult Regular)  Pulse 78  Temp 97.7  F (36.5  C) (Temporal)  Resp 16  Ht 6' 0.24\" (1.835 m)  Wt 199 lb (90.3 kg)  SpO2 100%  BMI 26.81 kg/m2 Estimated body mass index is 26.81 kg/(m^2) as calculated from the following:    Height as of this encounter: 6' 0.24\" (1.835 m).    Weight as of this encounter: 199 lb (90.3 kg).  Medication Reconciliation: complete   Conchita Mayer CMA (AAMA)      "

## 2018-03-16 NOTE — ASSESSMENT & PLAN NOTE
Currently not on meds  Feeling tired   Recheck CBC and CRP to r/o UC   Not symptomatic  Follow results and treat accordingly  Encourage yrly physicals for follow up

## 2018-03-16 NOTE — MR AVS SNAPSHOT
"              After Visit Summary   3/16/2018    Onel White    MRN: [id number]           Patient Information     Date Of Birth          1990        Visit Information        Provider Department      3/16/2018 1:40 PM Naina Gutiérrez MD [facility]        Today's Diagnoses     Moderate persistent asthma with acute exacerbation        Ulcerative pancolitis without complication (H)           Follow-ups after your visit        Follow-up notes from your care team     Return in about 6 months (around 2018) for Physical Exam.      Who to contact     If you have questions or need follow up information about today's clinic visit or your schedule please contact [facility] directly at [phone].  Normal or non-critical lab and imaging results will be communicated to you by MyChart, letter or phone within 4 business days after the clinic has received the results. If you do not hear from us within 7 days, please contact the clinic through [email or web address]. Inchart or phone. If you have a critical or abnormal lab result, we will notify you by phone as soon as possible.  Submit refill requests through [employer] or call your pharmacy and they will forward the refill request to us. Please allow 3 business days for your refill to be completed.          Additional Information About Your Visit        MyChart Information     PlayyOn lets you send messages to your doctor, view your test results, renew your prescriptions, schedule appointments and more. To sign up, go to [email or web address] . Click on \"Log in\" on the left side of the screen, which will take you to the Welcome page. Then click on \"Sign up Now\" on the right side of the page.     You will be asked to enter the access code listed below, as well as some personal information. Please follow the directions to create your username and password.     Your access code is: [email or web address]  Expires: 2018  2:17 PM     Your access code will  in 90 " "days. If you need help or a new code, please call your [place] clinic or [phone].        Care EveryWhere ID     This is your Care EveryWhere ID. This could be used by other organizations to access your [place] medical records  [id number]        Your Vitals Were     Pulse Temperature Respirations Height Pulse Oximetry BMI (Body Mass Index)    78 97.7  F (36.5  C) (Temporal) 16 6' 0.24\" (1.835 m) 100% 26.81 kg/m2       Blood Pressure from Last 3 Encounters:   03/16/18 126/84   03/15/18 112/60   12/19/17 124/70    Weight from Last 3 Encounters:   03/16/18 199 lb (90.3 kg)   03/15/18 199 lb (90.3 kg)   12/19/17 194 lb (88 kg)              We Performed the Following     CBC with platelets     Comprehensive metabolic panel (BMP + Alb, Alk Phos, ALT, AST, Total. Bili, TP)     CRP, inflammation          Today's Medication Changes          These changes are accurate as of 3/16/18  2:19 PM.  If you have any questions, ask your nurse or doctor.               Start taking these medicines.        Dose/Directions    predniSONE 20 MG tablet   Commonly known as:  DELTASONE   Used for:  Moderate persistent asthma with acute exacerbation   Started by:  Naina Gutiérrez MD        Dose:  40 mg   Take 2 tablets (40 mg) by mouth daily for 5 days   Quantity:  10 tablet   Refills:  1         These medicines have changed or have updated prescriptions.        Dose/Directions    fluticasone-vilanterol 100-25 MCG/INH oral inhaler   Commonly known as:  BREO ELLIPTA   This may have changed:  additional instructions   Used for:  Moderate persistent asthma with acute exacerbation   Changed by:  Naina Gutiérrez MD        INHALE ONE PUFF INTO THE LUNGS DAILY   Quantity:  3 Inhaler   Refills:  1       order for DME   This may have changed:  Another medication with the same name was removed. Continue taking this medication, and follow the directions you see here.   Used for:  Chronic pain of right knee   Changed by:  Naina Gutiérrez MD "        Equipment being ordered: patellar stabilization brace with horseshoe, large   Quantity:  1 Device   Refills:  0         Stop taking these medicines if you haven't already. Please contact your care team if you have questions.     ipratropium - albuterol 0.5 mg/2.5 mg/3 mL 0.5-2.5 (3) MG/3ML neb solution   Commonly known as:  DUONEB   Stopped by:  Naina Gutiérrez MD           nebulizer/tubing/mouthpiece Kit   Stopped by:  Naina Gutiérrez MD                Where to get your medicines      These medications were sent to [facility] [place], [place] - [address]   [address] , [facility] [address]     Phone:  [phone]     albuterol 108 (90 BASE) MCG/ACT Inhaler    fluticasone-vilanterol 100-25 MCG/INH oral inhaler    predniSONE 20 MG tablet                Primary Care Provider Fax #    Physician No Ref-Primary [phone]       No address on file        Equal Access to Services     [facility]: Hadii aad ku hadasho Soliza, waaxda luqadaha, qaybta kaalmada adeegyada, waxay bridger price . So [facility] [phone].    ATENCIÓN: Si habla español, tiene a dobbins disposición servicios gratuitos de asistencia lingüística. [facility] [phone].    We comply with applicable federal civil rights laws and [place] laws. We do not discriminate on the basis of race, color, national origin, age, disability, sex, sexual orientation, or gender identity.            Thank you!     Thank you for choosing [facility]  for your care. Our goal is always to provide you with excellent care. Hearing back from our patients is one way we can continue to improve our services. Please take a few minutes to complete the written survey that you may receive in the mail after your visit with us. Thank you!             Your Updated Medication List - Protect others around you: Learn how to safely use, store and throw away your medicines at www.disposemymeds.org.          This list is  accurate as of 3/16/18  2:19 PM.  Always use your most recent med list.                   Brand Name Dispense Instructions for use Diagnosis    * albuterol (2.5 MG/3ML) 0.083% neb solution     180 mL    NEBULIZE THE CONTENTS OF 1 VIAL BY MOUTH EVERY 6 HOURS AS NEEDED FOR SHORTNESS OF BREATH / DYSPNEA OR WHEEZING    Moderate persistent asthma with acute exacerbation, Cough, Acute bronchospasm       * albuterol 108 (90 BASE) MCG/ACT Inhaler    PROAIR HFA/PROVENTIL HFA/VENTOLIN HFA    3 Inhaler    Inhale 2 puffs into the lungs every 6 hours as needed for shortness of breath / dyspnea or wheezing    Moderate persistent asthma with acute exacerbation       fluticasone-vilanterol 100-25 MCG/INH oral inhaler    BREO ELLIPTA    3 Inhaler    INHALE ONE PUFF INTO THE LUNGS DAILY    Moderate persistent asthma with acute exacerbation       order for DME     1 Device    Equipment being ordered: patellar stabilization brace with horseshoe, large    Chronic pain of right knee       predniSONE 20 MG tablet    DELTASONE    10 tablet    Take 2 tablets (40 mg) by mouth daily for 5 days    Moderate persistent asthma with acute exacerbation       * Notice:  This list has 2 medication(s) that are the same as other medications prescribed for you. Read the directions carefully, and ask your doctor or other care provider to review them with you.

## 2018-03-16 NOTE — LETTER
March 19, 2018      Onel White  [address] [place] [address]        Dear Onel,     This letter is to inform you that your recent lab results have come back completely normal.       Results for orders placed or performed in visit on 03/16/18   CBC with platelets   Result Value Ref Range    WBC 7.4 4.0 - 11.0 10e9/L    RBC Count 5.14 4.4 - 5.9 10e12/L    Hemoglobin 16.2 13.3 - 17.7 g/dL    Hematocrit 47.6 40.0 - 53.0 %    MCV 93 78 - 100 fl    MCH 31.5 26.5 - 33.0 pg    MCHC 34.0 31.5 - 36.5 g/dL    RDW 11.9 10.0 - 15.0 %    Platelet Count 232 150 - 450 10e9/L   CRP, inflammation   Result Value Ref Range    CRP Inflammation <2.9 0.0 - 8.0 mg/L   Comprehensive metabolic panel (BMP + Alb, Alk Phos, ALT, AST, Total. Bili, TP)   Result Value Ref Range    Sodium 140 133 - 144 mmol/L    Potassium 3.7 3.4 - 5.3 mmol/L    Chloride 104 94 - 109 mmol/L    Carbon Dioxide 29 20 - 32 mmol/L    Anion Gap 7 3 - 14 mmol/L    Glucose 80 70 - 99 mg/dL    Urea Nitrogen 8 7 - 30 mg/dL    Creatinine 0.91 0.66 - 1.25 mg/dL    GFR Estimate >90 >60 mL/min/1.7m2    GFR Estimate If Black >90 >60 mL/min/1.7m2    Calcium 8.7 8.5 - 10.1 mg/dL    Bilirubin Total 0.7 0.2 - 1.3 mg/dL    Albumin 4.1 3.4 - 5.0 g/dL    Protein Total 7.5 6.8 - 8.8 g/dL    Alkaline Phosphatase 88 40 - 150 U/L    ALT 59 0 - 70 U/L    AST 36 0 - 45 U/L       Please call the clinic if you have any questions or concerns.    Thank you,  [facility]

## 2018-03-17 ASSESSMENT — ASTHMA QUESTIONNAIRES: ACT_TOTALSCORE: 12

## 2018-04-30 ENCOUNTER — TELEPHONE (OUTPATIENT)
Dept: FAMILY MEDICINE | Facility: OTHER | Age: 28
End: 2018-04-30

## 2018-04-30 NOTE — LETTER
[address]   [facility] [address]  Phone: [phone]  April 30, 2018      Onel White  [address] [place] [address]      Dear Onel,    We care about your health and have reviewed your health plan including your medical conditions, medications, and lab results.  Based on this review, it is recommended that you follow up regarding the following health topic(s):  -Asthma    We recommend you take the following action(s):   -Complete and return the attached ASTHMA CONTROL TEST.  If your total score is 19 or less or you have been to the ER or urgent care for your asthma, then please schedule an asthma followup appointment.     Please call us at the [facility] - [phone] (or use [employer]) to address the above recommendations.     Thank you for trusting [facility] and we appreciate the opportunity to serve you.  We look forward to supporting your healthcare needs in the future.    Healthy Regards,    Your Health Care Team  [facility]

## 2018-04-30 NOTE — TELEPHONE ENCOUNTER
Summary:    Patient is due/failing the following:   ACT    Action needed:   Patient needs to do ACT.    Type of outreach:    Sent letter.    Questions for provider review:    None                                                                                                                                    Allegra Kameron     Chart routed to Care Team .        Panel Management Review      Patient has the following on his problem list:     Asthma review     ACT Total Scores 3/16/2018   ACT TOTAL SCORE -   ASTHMA ER VISITS -   ASTHMA HOSPITALIZATIONS -   ACT TOTAL SCORE (Goal Greater than or Equal to 20) 12   In the past 12 months, how many times did you visit the emergency room for your asthma without being admitted to the hospital? 0   In the past 12 months, how many times were you hospitalized overnight because of your asthma? 0      1. Is Asthma diagnosis on the Problem List? Yes    2. Is Asthma listed on Health Maintenance? Yes    3. Patient is due for:  ACT      Composite cancer screening  Chart review shows that this patient is due/due soon for the following None

## 2018-08-14 ENCOUNTER — TELEPHONE (OUTPATIENT)
Dept: FAMILY MEDICINE | Facility: OTHER | Age: 28
End: 2018-08-14

## 2018-08-14 NOTE — LETTER
[address]   [facility] [address]  Phone: [phone]  August 14, 2018      Onel White  [address] [facility] [address]      Dear Onel,    We care about your health and have reviewed your health plan including your medical conditions, medications, and lab results.  Based on this review, it is recommended that you follow up regarding the following health topic(s):  -Asthma    We recommend you take the following action(s):   -Complete and return the attached ASTHMA CONTROL TEST.  If your total score is 19 or less or you have been to the ER or urgent care for your asthma, then please schedule an asthma followup appointment.     Please call us at the [facility] - [phone] (or use [employer]) to address the above recommendations.     Thank you for trusting [facility] and we appreciate the opportunity to serve you.  We look forward to supporting your healthcare needs in the future.    Healthy Regards,    Your Health Care Team  [facility]

## 2018-09-17 ENCOUNTER — TELEPHONE (OUTPATIENT)
Dept: FAMILY MEDICINE | Facility: OTHER | Age: 28
End: 2018-09-17

## 2018-09-17 DIAGNOSIS — J45.41 MODERATE PERSISTENT ASTHMA WITH ACUTE EXACERBATION: ICD-10-CM

## 2018-09-18 NOTE — TELEPHONE ENCOUNTER
Requested Prescriptions   Pending Prescriptions Disp Refills     predniSONE (DELTASONE) 20 MG tablet [Pharmacy Med Name: PREDNISONE 20MG TABS] 10 tablet 1     Sig: TAKE TWO TABLETS BY MOUTH EVERY DAY FOR 5 DAYS    There is no refill protocol information for this order        Routing refill request to provider for review/approval because:  Drug not on the [facility] refill protocol   Selina Benitez RN, BSN

## 2018-09-19 RX ORDER — PREDNISONE 20 MG/1
TABLET ORAL
Qty: 10 TABLET | Refills: 1 | OUTPATIENT
Start: 2018-09-19

## 2018-09-27 ENCOUNTER — OFFICE VISIT (OUTPATIENT)
Dept: FAMILY MEDICINE | Facility: CLINIC | Age: 28
End: 2018-09-27
Payer: COMMERCIAL

## 2018-09-27 VITALS
DIASTOLIC BLOOD PRESSURE: 92 MMHG | WEIGHT: 203.8 LBS | BODY MASS INDEX: 27.46 KG/M2 | TEMPERATURE: 98.7 F | SYSTOLIC BLOOD PRESSURE: 146 MMHG | RESPIRATION RATE: 18 BRPM | OXYGEN SATURATION: 98 % | HEART RATE: 75 BPM

## 2018-09-27 DIAGNOSIS — J45.41 MODERATE PERSISTENT ASTHMA WITH ACUTE EXACERBATION: ICD-10-CM

## 2018-09-27 PROCEDURE — 99213 OFFICE O/P EST LOW 20 MIN: CPT | Performed by: FAMILY MEDICINE

## 2018-09-27 RX ORDER — ALBUTEROL SULFATE 90 UG/1
2 AEROSOL, METERED RESPIRATORY (INHALATION) EVERY 6 HOURS PRN
Qty: 3 INHALER | Refills: 1 | Status: SHIPPED | OUTPATIENT
Start: 2018-09-27 | End: 2020-07-07

## 2018-09-27 RX ORDER — PREDNISONE 20 MG/1
40 TABLET ORAL DAILY
Qty: 10 TABLET | Refills: 1 | Status: SHIPPED | OUTPATIENT
Start: 2018-09-27 | End: 2019-01-14

## 2018-09-27 ASSESSMENT — PAIN SCALES - GENERAL: PAINLEVEL: NO PAIN (0)

## 2018-09-27 NOTE — MR AVS SNAPSHOT
After Visit Summary   9/27/2018    Onel White    MRN: [id number]           Patient Information     Date Of Birth          1990        Visit Information        Provider Department      9/27/2018 3:40 PM Jimmy Hinds MD [facility]        Today's Diagnoses     Moderate persistent asthma with acute exacerbation           Follow-ups after your visit        Who to contact     If you have questions or need follow up information about today's clinic visit or your schedule please contact [facility] directly at [phone].  Normal or non-critical lab and imaging results will be communicated to you by MyChart, letter or phone within 4 business days after the clinic has received the results. If you do not hear from us within 7 days, please contact the clinic through MyChart or phone. If you have a critical or abnormal lab result, we will notify you by phone as soon as possible.  Submit refill requests through [email or web address] or call your pharmacy and they will forward the refill request to us. Please allow 3 business days for your refill to be completed.          Additional Information About Your Visit        Care EveryWhere ID     This is your Care EveryWhere ID. This could be used by other organizations to access your [facility] medical records  [id number]        Your Vitals Were     Pulse Temperature Respirations Pulse Oximetry BMI (Body Mass Index)       75 98.7  F (37.1  C) (Temporal) 18 98% 27.46 kg/m2        Blood Pressure from Last 3 Encounters:   09/27/18 (!) 146/92   03/16/18 126/84   03/15/18 112/60    Weight from Last 3 Encounters:   09/27/18 203 lb 12.8 oz (92.4 kg)   03/16/18 199 lb (90.3 kg)   03/15/18 199 lb (90.3 kg)              Today, you had the following     No orders found for display         Today's Medication Changes          These changes are accurate as of 9/27/18  4:00 PM.  If you have any questions, ask your nurse or doctor.               Start  taking these medicines.        Dose/Directions    predniSONE 20 MG tablet   Commonly known as:  DELTASONE   Used for:  Moderate persistent asthma with acute exacerbation   Started by:  Jimmy Hinds MD        Dose:  40 mg   Take 2 tablets (40 mg) by mouth daily   Quantity:  10 tablet   Refills:  1            Where to get your medicines      These medications were sent to [facility] [place], [place] - [address]   [address] , [facility] [address]     Phone:  [phone]     albuterol 108 (90 Base) MCG/ACT inhaler    fluticasone-vilanterol 100-25 MCG/INH inhaler    predniSONE 20 MG tablet                Primary Care Provider Fax #    Physician No Ref-Primary [phone]       No address on file        Equal Access to Services     [employer] [facility] : Alex Thrasher, wazeinab rushing, qaybta kaalmada gerard, sadaf wilkerson. So [facility] [phone].    ATENCIÓN: Si habla español, tiene a dobbins disposición servicios gratuitos de asistencia lingüística. Llame al [phone].    We comply with applicable federal civil rights laws and [place] laws. We do not discriminate on the basis of race, color, national origin, age, disability, sex, sexual orientation, or gender identity.            Thank you!     Thank you for choosing [facility]  for your care. Our goal is always to provide you with excellent care. Hearing back from our patients is one way we can continue to improve our services. Please take a few minutes to complete the written survey that you may receive in the mail after your visit with us. Thank you!             Your Updated Medication List - Protect others around you: Learn how to safely use, store and throw away your medicines at www.disposemymeds.org.          This list is accurate as of 9/27/18  4:00 PM.  Always use your most recent med list.                   Brand Name Dispense Instructions for use Diagnosis    * albuterol  (2.5 MG/3ML) 0.083% neb solution     180 mL    NEBULIZE THE CONTENTS OF 1 VIAL BY MOUTH EVERY 6 HOURS AS NEEDED FOR SHORTNESS OF BREATH / DYSPNEA OR WHEEZING    Moderate persistent asthma with acute exacerbation, Cough, Acute bronchospasm       * albuterol 108 (90 Base) MCG/ACT inhaler    PROAIR HFA/PROVENTIL HFA/VENTOLIN HFA    3 Inhaler    Inhale 2 puffs into the lungs every 6 hours as needed for shortness of breath / dyspnea or wheezing    Moderate persistent asthma with acute exacerbation       fluticasone-vilanterol 100-25 MCG/INH inhaler    BREO ELLIPTA    3 Inhaler    INHALE ONE PUFF INTO THE LUNGS DAILY    Moderate persistent asthma with acute exacerbation       order for DME     1 Device    Equipment being ordered: patellar stabilization brace with horseshoe, large    Chronic pain of right knee       predniSONE 20 MG tablet    DELTASONE    10 tablet    Take 2 tablets (40 mg) by mouth daily    Moderate persistent asthma with acute exacerbation       * Notice:  This list has 2 medication(s) that are the same as other medications prescribed for you. Read the directions carefully, and ask your doctor or other care provider to review them with you.
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
## 2018-09-28 ASSESSMENT — ASTHMA QUESTIONNAIRES: ACT_TOTALSCORE: 19

## 2018-10-18 ENCOUNTER — TELEPHONE (OUTPATIENT)
Dept: FAMILY MEDICINE | Facility: CLINIC | Age: 28
End: 2018-10-18

## 2018-10-18 NOTE — TELEPHONE ENCOUNTER
Panel Management Review      Patient has the following on his problem list:     Asthma review     ACT Total Scores 9/27/2018   ACT TOTAL SCORE -   ASTHMA ER VISITS -   ASTHMA HOSPITALIZATIONS -   ACT TOTAL SCORE (Goal Greater than or Equal to 20) 19   In the past 12 months, how many times did you visit the emergency room for your asthma without being admitted to the hospital? 1   In the past 12 months, how many times were you hospitalized overnight because of your asthma? 0      1. Is Asthma diagnosis on the Problem List? Yes    2. Is Asthma listed on Health Maintenance? Yes    3. Patient is due for:  ACT      Composite cancer screening  Chart review shows that this patient is due/due soon for the following None  Summary:    Patient is due/failing the following:   ACT    Action needed:   Patient needs to do ACT.    Type of outreach:    Sent letter.    Questions for provider review:    None                                                                                                                                    Aida Cool MA     Chart routed to Care Team .

## 2019-01-14 DIAGNOSIS — J45.41 MODERATE PERSISTENT ASTHMA WITH ACUTE EXACERBATION: ICD-10-CM

## 2019-01-15 NOTE — TELEPHONE ENCOUNTER
Prednisone 20 MG      Last Written Prescription Date:  9-27-18  Last Fill Quantity: 10,   # refills: 1  Last Office Visit: 9-27-18  Future Office visit:       Routing refill request to provider for review/approval because:  Drug not on the [facility] or [facility] refill protocol or controlled substance

## 2019-01-16 RX ORDER — PREDNISONE 20 MG/1
TABLET ORAL
Qty: 10 TABLET | Refills: 1 | Status: SHIPPED | OUTPATIENT
Start: 2019-01-16 | End: 2019-04-17

## 2019-03-19 ENCOUNTER — OFFICE VISIT (OUTPATIENT)
Dept: URGENT CARE | Facility: RETAIL CLINIC | Age: 29
End: 2019-03-19
Payer: COMMERCIAL

## 2019-03-19 ENCOUNTER — OFFICE VISIT (OUTPATIENT)
Dept: GASTROENTEROLOGY | Facility: CLINIC | Age: 29
End: 2019-03-19
Payer: COMMERCIAL

## 2019-03-19 VITALS
DIASTOLIC BLOOD PRESSURE: 77 MMHG | OXYGEN SATURATION: 97 % | HEART RATE: 93 BPM | SYSTOLIC BLOOD PRESSURE: 137 MMHG | TEMPERATURE: 98.2 F

## 2019-03-19 VITALS
WEIGHT: 192.1 LBS | HEIGHT: 75 IN | OXYGEN SATURATION: 93 % | DIASTOLIC BLOOD PRESSURE: 86 MMHG | BODY MASS INDEX: 23.89 KG/M2 | SYSTOLIC BLOOD PRESSURE: 131 MMHG | HEART RATE: 85 BPM

## 2019-03-19 DIAGNOSIS — J20.9 ACUTE BRONCHITIS WITH SYMPTOMS GREATER THAN 10 DAYS: Primary | ICD-10-CM

## 2019-03-19 DIAGNOSIS — K51.011 ULCERATIVE PANCOLITIS WITH RECTAL BLEEDING (H): Primary | ICD-10-CM

## 2019-03-19 LAB
ALBUMIN SERPL-MCNC: 4.4 G/DL (ref 3.4–5)
ALP SERPL-CCNC: 224 U/L (ref 40–150)
ALT SERPL W P-5'-P-CCNC: 98 U/L (ref 0–70)
ANION GAP SERPL CALCULATED.3IONS-SCNC: 8 MMOL/L (ref 3–14)
AST SERPL W P-5'-P-CCNC: 36 U/L (ref 0–45)
BASOPHILS # BLD AUTO: 0.1 10E9/L (ref 0–0.2)
BASOPHILS NFR BLD AUTO: 0.6 %
BILIRUB SERPL-MCNC: 0.8 MG/DL (ref 0.2–1.3)
BUN SERPL-MCNC: 8 MG/DL (ref 7–30)
CALCIUM SERPL-MCNC: 9.6 MG/DL (ref 8.5–10.1)
CHLORIDE SERPL-SCNC: 103 MMOL/L (ref 94–109)
CO2 SERPL-SCNC: 29 MMOL/L (ref 20–32)
CREAT SERPL-MCNC: 1.04 MG/DL (ref 0.66–1.25)
CRP SERPL-MCNC: <2.9 MG/L (ref 0–8)
DIFFERENTIAL METHOD BLD: ABNORMAL
EOSINOPHIL # BLD AUTO: 0.3 10E9/L (ref 0–0.7)
EOSINOPHIL NFR BLD AUTO: 2.8 %
ERYTHROCYTE [DISTWIDTH] IN BLOOD BY AUTOMATED COUNT: 12.5 % (ref 10–15)
GFR SERPL CREATININE-BSD FRML MDRD: >90 ML/MIN/{1.73_M2}
GLUCOSE SERPL-MCNC: 105 MG/DL (ref 70–99)
HCT VFR BLD AUTO: 50.4 % (ref 40–53)
HGB BLD-MCNC: 16.7 G/DL (ref 13.3–17.7)
IMM GRANULOCYTES # BLD: 0.1 10E9/L (ref 0–0.4)
IMM GRANULOCYTES NFR BLD: 0.6 %
LYMPHOCYTES # BLD AUTO: 0.6 10E9/L (ref 0.8–5.3)
LYMPHOCYTES NFR BLD AUTO: 6.7 %
MCH RBC QN AUTO: 30.6 PG (ref 26.5–33)
MCHC RBC AUTO-ENTMCNC: 33.1 G/DL (ref 31.5–36.5)
MCV RBC AUTO: 92 FL (ref 78–100)
MONOCYTES # BLD AUTO: 0.1 10E9/L (ref 0–1.3)
MONOCYTES NFR BLD AUTO: 1.5 %
NEUTROPHILS # BLD AUTO: 7.7 10E9/L (ref 1.6–8.3)
NEUTROPHILS NFR BLD AUTO: 87.8 %
PLATELET # BLD AUTO: 276 10E9/L (ref 150–450)
POTASSIUM SERPL-SCNC: 4.1 MMOL/L (ref 3.4–5.3)
PROT SERPL-MCNC: 8.5 G/DL (ref 6.8–8.8)
RBC # BLD AUTO: 5.46 10E12/L (ref 4.4–5.9)
SODIUM SERPL-SCNC: 140 MMOL/L (ref 133–144)
WBC # BLD AUTO: 8.8 10E9/L (ref 4–11)

## 2019-03-19 PROCEDURE — 82306 VITAMIN D 25 HYDROXY: CPT | Performed by: INTERNAL MEDICINE

## 2019-03-19 PROCEDURE — 99213 OFFICE O/P EST LOW 20 MIN: CPT | Performed by: INTERNAL MEDICINE

## 2019-03-19 PROCEDURE — 83516 IMMUNOASSAY NONANTIBODY: CPT | Mod: 59 | Performed by: INTERNAL MEDICINE

## 2019-03-19 PROCEDURE — 99204 OFFICE O/P NEW MOD 45 MIN: CPT | Performed by: INTERNAL MEDICINE

## 2019-03-19 PROCEDURE — 86140 C-REACTIVE PROTEIN: CPT | Performed by: INTERNAL MEDICINE

## 2019-03-19 PROCEDURE — 85025 COMPLETE CBC W/AUTO DIFF WBC: CPT | Performed by: INTERNAL MEDICINE

## 2019-03-19 PROCEDURE — 82784 ASSAY IGA/IGD/IGG/IGM EACH: CPT | Performed by: INTERNAL MEDICINE

## 2019-03-19 PROCEDURE — 80053 COMPREHEN METABOLIC PANEL: CPT | Performed by: INTERNAL MEDICINE

## 2019-03-19 PROCEDURE — 36415 COLL VENOUS BLD VENIPUNCTURE: CPT | Performed by: INTERNAL MEDICINE

## 2019-03-19 RX ORDER — ALBUTEROL SULFATE 0.83 MG/ML
2.5 SOLUTION RESPIRATORY (INHALATION) 4 TIMES DAILY
Qty: 56 VIAL | Refills: 0 | Status: SHIPPED | OUTPATIENT
Start: 2019-03-19 | End: 2020-03-17

## 2019-03-19 RX ORDER — ALBUTEROL SULFATE 90 UG/1
2 AEROSOL, METERED RESPIRATORY (INHALATION) 4 TIMES DAILY
Qty: 8.5 G | Refills: 0 | Status: SHIPPED | OUTPATIENT
Start: 2019-03-19 | End: 2020-07-07

## 2019-03-19 ASSESSMENT — PAIN SCALES - GENERAL
PAINLEVEL: NO PAIN (0)
PAINLEVEL: NO PAIN (0)

## 2019-03-19 ASSESSMENT — MIFFLIN-ST. JEOR: SCORE: 1931.99

## 2019-03-19 NOTE — NURSING NOTE
Chief Complaint   Patient presents with     Nasal Congestion     on and off for about a month     Cough     MP/MA

## 2019-03-19 NOTE — NURSING NOTE
"Onel White's goals for this visit include: refills    He requests these members of his care team be copied on today's visit information: no    PCP: No Ref-Primary, Physician    Referring Provider:  Referred Self, MD  No address on file    /86   Pulse 85   Ht 1.905 m (6' 3\")   Wt 87.1 kg (192 lb 1.6 oz)   SpO2 93%   BMI 24.01 kg/m      Do you need any medication refills at today's visit? No    Mukund Alvarez CMA      "

## 2019-03-19 NOTE — PATIENT INSTRUCTIONS
Get blood work     Submit stool sample    Schedule colonoscopy.  We can add a spot on Monday 3/25 if this works for you.  Ok to do the miralax prep.    Follow up in the office in 3 months.

## 2019-03-20 LAB — IGA SERPL-MCNC: 211 MG/DL (ref 70–380)

## 2019-03-20 NOTE — PROGRESS NOTES
GASTROENTEROLOGY NEW PATIENT CLINIC VISIT    CC/REFERRING MD:    No Ref-Primary, Physician  Referred Self    REASON FOR CONSULTATION:   Referred Self for   Chief Complaint   Patient presents with     Consult     Ulcerative Colitis        HISTORY OF PRESENT ILLNESS:    Onel White is 28 year old male who presents for evaluation of diarrhea and blood in the stool.  He has a history of ulcerative pancolitis which was diagnosed in 2009.  He previously was followed by Dr. Maldonado in 2010 and has been on steroids and sulfasalazine in the past.  He notes that he was on this for some time but then discontinued his medication as he was feeling well.  He did well for some years until a few months ago when he developed diarrhea.  He was having loose stools 15-20 times daily along with abdominal cramping and blood in stool.  He reports the symptoms are somewhat different than his original symptoms from the time of colitis diagnosis.  He notes that he is feeling somewhat improved compared to a few months ago.  He is currently having approximately 5 bowel movements per day which are often nocturnal.  He has a few bowel movements during the day but they are less bothersome.  Other medical history includes asthma and he is currently on 20 mg of prednisone for this which he takes as needed when having pulmonary symptoms.  Surgical history includes appendectomy in 2006.  Family history is notable for father with Crohn's disease.    PROBLEM LIST  Patient Active Problem List    Diagnosis Date Noted     UC (Ulcerative Colitis), involving the entire gabi 06/15/2010     Priority: High     - Diagnosed: 06/2009, at age[age]  years    - Location (visual):     Pancolitis (proximal to hepatic flexure) (E4)    - Extent (histopath):    - Severity:     Never severe (S0)                           - TPMT phenotype:     07/28/2009 Normal  - IBD7:                            Not done    - PPD status:      Not done    - Extraintestinal  manifestations: None   - Previous IBD-related medications and reasons for their discontinuation:    - Immunization status: Unknown  - Varicella, measles, mumps IgG status: Unknown    - Eye exam:                date and result    - Previous abdominal surgeries: dates  - Previous admissions for IBD: dates       Moderate persistent asthma with acute exacerbation 09/24/2014     Priority: Medium     PPD screening test-07/28/2009 Negative 11/05/2010     Priority: Medium     Other acne      Priority: Medium       PERTINENT PAST MEDICAL HISTORY:  (I personally reviewed this history with the patient at today's visit)   Past Medical History:   Diagnosis Date     Abdominal pain, generalized      Anemia, unspecified      Diarrhea      Other acne      Ulcerative (chronic) enterocolitis (H) 8/3/2009     Ulcerative colitis     followed by [facility], [facility]         PREVIOUS SURGERIES: (I personally reviewed this history with the patient at today's visit)   Past Surgical History:   Procedure Laterality Date     C APPENDECTOMY,RUPT APPENDX+ABSCESS  2006    peritonitis         ALLERGIES:     Allergies   Allergen Reactions     Wheat        PERTINENT MEDICATIONS:    Current Outpatient Medications:      albuterol (2.5 MG/3ML) 0.083% neb solution, NEBULIZE THE CONTENTS OF 1 VIAL BY MOUTH EVERY 6 HOURS AS NEEDED FOR SHORTNESS OF BREATH / DYSPNEA OR WHEEZING, Disp: 180 mL, Rfl: 11     albuterol (PROAIR HFA/PROVENTIL HFA/VENTOLIN HFA) 108 (90 Base) MCG/ACT inhaler, Inhale 2 puffs into the lungs every 6 hours as needed for shortness of breath / dyspnea or wheezing, Disp: 3 Inhaler, Rfl: 1     fluticasone-vilanterol (BREO ELLIPTA) 100-25 MCG/INH inhaler, INHALE ONE PUFF INTO THE LUNGS DAILY, Disp: 3 Inhaler, Rfl: 1     predniSONE (DELTASONE) 20 MG tablet, TAKE TWO TABLETS BY MOUTH EVERY DAY, Disp: 10 tablet, Rfl: 1     albuterol (PROAIR HFA/PROVENTIL HFA/VENTOLIN HFA) 108 (90 Base) MCG/ACT inhaler, Inhale 2 puffs into the  lungs 4 times daily, Disp: 8.5 g, Rfl: 0     albuterol (PROVENTIL) (2.5 MG/3ML) 0.083% neb solution, Take 1 vial (2.5 mg) by nebulization 4 times daily for 14 days, Disp: 56 vial, Rfl: 0     order for DME, Equipment being ordered: patellar stabilization brace with horseshoe, large, Disp: 1 Device, Rfl: 0    SOCIAL HISTORY:  Social History     Socioeconomic History     Marital status: Single     Spouse name: Not on file     Number of children: Not on file     Years of education: Not on file     Highest education level: Not on file   Occupational History     Occupation: student     Employer: CH[employer]   Social Needs     Financial resource strain: Not on file     Food insecurity:     Worry: Not on file     Inability: Not on file     Transportation needs:     Medical: Not on file     Non-medical: Not on file   Tobacco Use     Smoking status: Former Smoker     Types: Cigarettes     Last attempt to quit: 2012     Years since quittin.8     Smokeless tobacco: Never Used   Substance and Sexual Activity     Alcohol use: Yes     Alcohol/week: 12.0 oz     Types: 20 Standard drinks or equivalent per week     Drug use: No     Types: Marijuana     Comment: Last 2017 marijuana     Sexual activity: Yes     Partners: Female     Birth control/protection: Condom   Lifestyle     Physical activity:     Days per week: Not on file     Minutes per session: Not on file     Stress: Not on file   Relationships     Social connections:     Talks on phone: Not on file     Gets together: Not on file     Attends [employer]ism service: Not on file     Active member of club or organization: Not on file     Attends meetings of clubs or organizations: Not on file     Relationship status: Not on file     Intimate partner violence:     Fear of current or ex partner: Not on file     Emotionally abused: Not on file     Physically abused: Not on file     Forced sexual activity: Not on file   Other Topics Concern     Parent/sibling w/ CABG, MI or  "angioplasty before 65F 55M? No   Social History Narrative    Lives with parents.  No domestic violence issues.       FAMILY HISTORY: (I personally reviewed this history with the patient at today's visit)  Family History   Problem Relation Age of Onset     Gastrointestinal Disease Father         crohns/ulceratice colitis     Asthma Father      Diabetes Paternal Grandmother         ROS:    No fevers or chills  No weight loss  No blurry vision, double vision or change in vision  No sore throat  No lymphadenopathy  No headache, paraesthesias, or weakness in a limb  No shortness of breath or wheezing  No chest pain or pressure  No arthralgias or myalgias  No rashes or skin changes  No odynophagia or dysphagia  No BRBPR, hematochezia, melena  No dysuria, frequency or urgency  No hot/cold intolerance or polyria  No anxiety or depression  PHYSICAL EXAMINATION:  Constitutional: aaox3, cooperative, pleasant, not dyspneic/diaphoretic, no acute distress  Vitals reviewed: /86   Pulse 85   Ht 1.905 m (6' 3\")   Wt 87.1 kg (192 lb 1.6 oz)   SpO2 93%   BMI 24.01 kg/m    Wt:   Wt Readings from Last 2 Encounters:   03/19/19 87.1 kg (192 lb 1.6 oz)   09/27/18 92.4 kg (203 lb 12.8 oz)      Eyes: Sclera anicteric/injected  Ears/nose/mouth/throat: Normal oropharynx without ulcers or exudate, mucus membranes moist, hearing intact  Neck: supple, thyroid normal size  CV: No edema, RRR  Respiratory: Unlabored breathing, CTAB  Lymph: No submandibular, supraclavicular or inguinal lymphadenopathy  Abd: Nondistended, no masses, +bs, no hepatosplenomegaly, nontender, no peritoneal signs  Skin: warm, perfused, no jaundice  Psych: Normal affect  MSK: Normal gait    PERTINENT STUDIES: (I personally reviewed these laboratory studies today)  Most recent CBC:   Recent Labs   Lab Test 03/19/19  1015 03/16/18  1423   WBC 8.8 7.4   HGB 16.7 16.2   HCT 50.4 47.6    232     Most recent hepatic panel:  Recent Labs   Lab Test 03/19/19  1015 " 03/16/18  1423   ALT 98* 59   AST 36 36     Most recent creatinine:  Recent Labs   Lab Test 03/19/19  1015 03/16/18  1423   CR 1.04 0.91     ASSESSMENT/PLAN:    Onel White is a 28 year old male who presents for evaluation of loose stools and rectal bleeding in the setting of a prior diagnosis of ulcerative colitis.  He has been off of medication for several years but previously did have ulcerative pancolitis.  Symptoms are concerning for ulcerative colitis flare, but infection and other sources of the symptoms need to be excluded.  I recommended that he obtain lab studies for metabolic panel, blood counts, inflammatory markers and stool studies to rule out C. difficile, other infections and for acute fecal calprotectin.  He will be scheduled for a colonoscopy and he will need MAC per his request.  We will hold off on any further treatment options until these assessments are complete and we will then see him back in the office.    Ulcerative pancolitis with rectal bleeding (H)  Orders Placed This Encounter   Procedures     Comprehensive metabolic panel     Standing Status:   Future     Number of Occurrences:   1     Standing Expiration Date:   3/18/2020     CBC with platelets differential     Last Lab Result: Hemoglobin (g/dL)       Date                     Value                 03/16/2018               16.2             ----------     Standing Status:   Future     Number of Occurrences:   1     Standing Expiration Date:   3/18/2020     Vitamin D Deficiency     Standing Status:   Future     Number of Occurrences:   1     Standing Expiration Date:   3/18/2020     CRP inflammation     Standing Status:   Future     Number of Occurrences:   1     Standing Expiration Date:   3/18/2020     Calprotectin Feces     Standing Status:   Future     Standing Expiration Date:   3/18/2020     Tissue transglutaminase delfin IgA and IgG     Standing Status:   Future     Number of Occurrences:   1     Standing Expiration Date:    3/18/2020     IgA     Standing Status:   Future     Number of Occurrences:   1     Standing Expiration Date:   3/18/2020     Clostridium difficile toxin B PCR     Standing Status:   Future     Standing Expiration Date:   4/18/2019     Ova and Parasite Exam Routine     Standing Status:   Future     Standing Expiration Date:   3/18/2020     RTC 3 months    Thank you for this consultation.  It was a pleasure to participate in the care of this patient; please contact us with any further questions.      This note was created with voice recognition software, and while reviewed for accuracy, typos may remain.     Liang Hancock MD  Adjunct  of Medicine  Division of Gastroenterology, Hepatology and Nutrition  [facility]  [phone]

## 2019-03-21 ENCOUNTER — TELEPHONE (OUTPATIENT)
Dept: GASTROENTEROLOGY | Facility: CLINIC | Age: 29
End: 2019-03-21

## 2019-03-21 LAB — DEPRECATED CALCIDIOL+CALCIFEROL SERPL-MC: 12 UG/L (ref 20–75)

## 2019-03-21 PROCEDURE — 87209 SMEAR COMPLEX STAIN: CPT | Performed by: INTERNAL MEDICINE

## 2019-03-21 PROCEDURE — 83993 ASSAY FOR CALPROTECTIN FECAL: CPT | Performed by: INTERNAL MEDICINE

## 2019-03-21 PROCEDURE — 87177 OVA AND PARASITES SMEARS: CPT | Performed by: INTERNAL MEDICINE

## 2019-03-21 PROCEDURE — 87493 C DIFF AMPLIFIED PROBE: CPT | Performed by: INTERNAL MEDICINE

## 2019-03-21 NOTE — TELEPHONE ENCOUNTER
Received message on clinic voicemail from Leighann who was requesting a return call to [phone] regarding some recent results they received.     Message sent to GI team to review and follow up.    Aida Lemons RN, BSN

## 2019-03-21 NOTE — TELEPHONE ENCOUNTER
Nurse called patient and Leighann, updated that the results have not been reviewed by Dr. Hancock. That when all results are back, Dr. Hancock will review and give recommendations.    Nancy Henriquez RN, BSN, PHN  [facility]  GI/Gen Surg/Hepatology Care Coordinator

## 2019-03-22 DIAGNOSIS — K51.011 ULCERATIVE PANCOLITIS WITH RECTAL BLEEDING (H): ICD-10-CM

## 2019-03-22 LAB
C DIFF TOX B STL QL: NEGATIVE
SPECIMEN SOURCE: NORMAL

## 2019-03-23 LAB
TTG IGA SER-ACNC: 1 U/ML
TTG IGG SER-ACNC: 2 U/ML

## 2019-03-25 LAB
O+P STL MICRO: NORMAL
O+P STL MICRO: NORMAL
SPECIMEN SOURCE: NORMAL

## 2019-03-26 DIAGNOSIS — R79.89 ABNORMAL LFTS: Primary | ICD-10-CM

## 2019-03-26 LAB — CALPROTECTIN STL-MCNT: 1502.1 MG/KG (ref 0–49.9)

## 2019-03-27 ENCOUNTER — TELEPHONE (OUTPATIENT)
Dept: GASTROENTEROLOGY | Facility: CLINIC | Age: 29
End: 2019-03-27

## 2019-03-27 NOTE — TELEPHONE ENCOUNTER
----- Message from Liang Hancock MD sent at 3/26/2019  4:09 PM CDT -----  Can you please asked this patient to start taking over-the-counter vitamin D 2000 units daily?  Thanks.

## 2019-03-27 NOTE — TELEPHONE ENCOUNTER
Nurse spoke to mother (consent to communicate is in the chart), updated her with Dr Hancock' recommendations.    Nancy Henriquez RN, BSN, PHN  [facility]  GI/Gen Surg/Hepatology Care Coordinator

## 2019-04-17 ENCOUNTER — SURGERY (OUTPATIENT)
Age: 29
End: 2019-04-17
Payer: COMMERCIAL

## 2019-04-17 ENCOUNTER — ANESTHESIA (OUTPATIENT)
Dept: SURGERY | Facility: AMBULATORY SURGERY CENTER | Age: 29
End: 2019-04-17
Payer: COMMERCIAL

## 2019-04-17 ENCOUNTER — NURSE TRIAGE (OUTPATIENT)
Dept: NURSING | Facility: CLINIC | Age: 29
End: 2019-04-17

## 2019-04-17 ENCOUNTER — ANESTHESIA EVENT (OUTPATIENT)
Dept: SURGERY | Facility: AMBULATORY SURGERY CENTER | Age: 29
End: 2019-04-17

## 2019-04-17 ENCOUNTER — HOSPITAL ENCOUNTER (OUTPATIENT)
Facility: AMBULATORY SURGERY CENTER | Age: 29
Discharge: HOME | End: 2019-04-17
Attending: INTERNAL MEDICINE | Admitting: INTERNAL MEDICINE
Payer: COMMERCIAL

## 2019-04-17 VITALS
DIASTOLIC BLOOD PRESSURE: 89 MMHG | HEART RATE: 65 BPM | RESPIRATION RATE: 16 BRPM | TEMPERATURE: 97.4 F | OXYGEN SATURATION: 100 % | SYSTOLIC BLOOD PRESSURE: 128 MMHG

## 2019-04-17 VITALS — HEART RATE: 82 BPM

## 2019-04-17 DIAGNOSIS — K51.011 ULCERATIVE PANCOLITIS WITH RECTAL BLEEDING (H): Primary | ICD-10-CM

## 2019-04-17 LAB — COLONOSCOPY: NORMAL

## 2019-04-17 PROCEDURE — G8918 PT W/O PREOP ORDER IV AB PRO: HCPCS

## 2019-04-17 PROCEDURE — 88305 TISSUE EXAM BY PATHOLOGIST: CPT | Performed by: INTERNAL MEDICINE

## 2019-04-17 PROCEDURE — 45381 COLONOSCOPY SUBMUCOUS NJX: CPT

## 2019-04-17 PROCEDURE — 45380 COLONOSCOPY AND BIOPSY: CPT | Mod: 59 | Performed by: INTERNAL MEDICINE

## 2019-04-17 PROCEDURE — G8907 PT DOC NO EVENTS ON DISCHARG: HCPCS

## 2019-04-17 PROCEDURE — 45381 COLONOSCOPY SUBMUCOUS NJX: CPT | Mod: 51 | Performed by: INTERNAL MEDICINE

## 2019-04-17 PROCEDURE — 45380 COLONOSCOPY AND BIOPSY: CPT | Mod: XS

## 2019-04-17 PROCEDURE — 45385 COLONOSCOPY W/LESION REMOVAL: CPT

## 2019-04-17 PROCEDURE — 45385 COLONOSCOPY W/LESION REMOVAL: CPT | Performed by: INTERNAL MEDICINE

## 2019-04-17 RX ORDER — LIDOCAINE 40 MG/G
CREAM TOPICAL
Status: DISCONTINUED | OUTPATIENT
Start: 2019-04-17 | End: 2019-04-18 | Stop reason: HOSPADM

## 2019-04-17 RX ORDER — PROPOFOL 10 MG/ML
INJECTION, EMULSION INTRAVENOUS PRN
Status: DISCONTINUED | OUTPATIENT
Start: 2019-04-17 | End: 2019-04-17

## 2019-04-17 RX ORDER — FENTANYL CITRATE 50 UG/ML
25-50 INJECTION, SOLUTION INTRAMUSCULAR; INTRAVENOUS
Status: CANCELLED | OUTPATIENT
Start: 2019-04-17

## 2019-04-17 RX ORDER — MESALAMINE 1.2 G/1
4800 TABLET, DELAYED RELEASE ORAL
Qty: 120 TABLET | Refills: 11 | Status: SHIPPED | OUTPATIENT
Start: 2019-04-17 | End: 2020-06-01

## 2019-04-17 RX ORDER — ONDANSETRON 4 MG/1
4 TABLET, ORALLY DISINTEGRATING ORAL EVERY 30 MIN PRN
Status: CANCELLED | OUTPATIENT
Start: 2019-04-17

## 2019-04-17 RX ORDER — KETOROLAC TROMETHAMINE 30 MG/ML
30 INJECTION, SOLUTION INTRAMUSCULAR; INTRAVENOUS EVERY 6 HOURS PRN
Status: CANCELLED | OUTPATIENT
Start: 2019-04-17 | End: 2019-04-22

## 2019-04-17 RX ORDER — ONDANSETRON 2 MG/ML
4 INJECTION INTRAMUSCULAR; INTRAVENOUS
Status: DISCONTINUED | OUTPATIENT
Start: 2019-04-17 | End: 2019-04-18 | Stop reason: HOSPADM

## 2019-04-17 RX ORDER — HYDROMORPHONE HYDROCHLORIDE 1 MG/ML
.3-.5 INJECTION, SOLUTION INTRAMUSCULAR; INTRAVENOUS; SUBCUTANEOUS EVERY 10 MIN PRN
Status: CANCELLED | OUTPATIENT
Start: 2019-04-17

## 2019-04-17 RX ORDER — NALOXONE HYDROCHLORIDE 0.4 MG/ML
.1-.4 INJECTION, SOLUTION INTRAMUSCULAR; INTRAVENOUS; SUBCUTANEOUS
Status: CANCELLED | OUTPATIENT
Start: 2019-04-17 | End: 2019-04-18

## 2019-04-17 RX ORDER — SODIUM CHLORIDE, SODIUM LACTATE, POTASSIUM CHLORIDE, CALCIUM CHLORIDE 600; 310; 30; 20 MG/100ML; MG/100ML; MG/100ML; MG/100ML
INJECTION, SOLUTION INTRAVENOUS CONTINUOUS
Status: CANCELLED | OUTPATIENT
Start: 2019-04-17

## 2019-04-17 RX ORDER — ONDANSETRON 2 MG/ML
4 INJECTION INTRAMUSCULAR; INTRAVENOUS EVERY 30 MIN PRN
Status: CANCELLED | OUTPATIENT
Start: 2019-04-17

## 2019-04-17 RX ORDER — OXYCODONE HYDROCHLORIDE 5 MG/1
5-10 TABLET ORAL EVERY 4 HOURS PRN
Status: CANCELLED | OUTPATIENT
Start: 2019-04-17

## 2019-04-17 RX ORDER — PREDNISONE 5 MG/1
TABLET ORAL
Qty: 228 TABLET | Refills: 0 | Status: SHIPPED | OUTPATIENT
Start: 2019-04-17 | End: 2020-07-07

## 2019-04-17 RX ORDER — ALBUTEROL SULFATE 0.83 MG/ML
2.5 SOLUTION RESPIRATORY (INHALATION) EVERY 4 HOURS PRN
Status: CANCELLED | OUTPATIENT
Start: 2019-04-17

## 2019-04-17 RX ORDER — LIDOCAINE HYDROCHLORIDE 20 MG/ML
INJECTION, SOLUTION INFILTRATION; PERINEURAL PRN
Status: DISCONTINUED | OUTPATIENT
Start: 2019-04-17 | End: 2019-04-17

## 2019-04-17 RX ORDER — SODIUM CHLORIDE, SODIUM LACTATE, POTASSIUM CHLORIDE, CALCIUM CHLORIDE 600; 310; 30; 20 MG/100ML; MG/100ML; MG/100ML; MG/100ML
INJECTION, SOLUTION INTRAVENOUS CONTINUOUS
Status: DISCONTINUED | OUTPATIENT
Start: 2019-04-17 | End: 2019-04-18 | Stop reason: HOSPADM

## 2019-04-17 RX ORDER — DEXAMETHASONE SODIUM PHOSPHATE 4 MG/ML
4 INJECTION, SOLUTION INTRA-ARTICULAR; INTRALESIONAL; INTRAMUSCULAR; INTRAVENOUS; SOFT TISSUE EVERY 10 MIN PRN
Status: CANCELLED | OUTPATIENT
Start: 2019-04-17

## 2019-04-17 RX ORDER — MEPERIDINE HYDROCHLORIDE 25 MG/ML
12.5 INJECTION INTRAMUSCULAR; INTRAVENOUS; SUBCUTANEOUS
Status: CANCELLED | OUTPATIENT
Start: 2019-04-17

## 2019-04-17 RX ADMIN — PROPOFOL 50 MG: 10 INJECTION, EMULSION INTRAVENOUS at 15:02

## 2019-04-17 RX ADMIN — PROPOFOL 30 MG: 10 INJECTION, EMULSION INTRAVENOUS at 15:12

## 2019-04-17 RX ADMIN — PROPOFOL 30 MG: 10 INJECTION, EMULSION INTRAVENOUS at 14:52

## 2019-04-17 RX ADMIN — PROPOFOL 50 MG: 10 INJECTION, EMULSION INTRAVENOUS at 15:00

## 2019-04-17 RX ADMIN — PROPOFOL 30 MG: 10 INJECTION, EMULSION INTRAVENOUS at 15:22

## 2019-04-17 RX ADMIN — PROPOFOL 30 MG: 10 INJECTION, EMULSION INTRAVENOUS at 15:06

## 2019-04-17 RX ADMIN — PROPOFOL 30 MG: 10 INJECTION, EMULSION INTRAVENOUS at 15:04

## 2019-04-17 RX ADMIN — LIDOCAINE HYDROCHLORIDE 100 MG: 20 INJECTION, SOLUTION INFILTRATION; PERINEURAL at 14:49

## 2019-04-17 RX ADMIN — PROPOFOL 50 MG: 10 INJECTION, EMULSION INTRAVENOUS at 15:01

## 2019-04-17 RX ADMIN — PROPOFOL 50 MG: 10 INJECTION, EMULSION INTRAVENOUS at 15:03

## 2019-04-17 RX ADMIN — PROPOFOL 200 MG: 10 INJECTION, EMULSION INTRAVENOUS at 14:49

## 2019-04-17 RX ADMIN — PROPOFOL 30 MG: 10 INJECTION, EMULSION INTRAVENOUS at 15:13

## 2019-04-17 RX ADMIN — SODIUM CHLORIDE, SODIUM LACTATE, POTASSIUM CHLORIDE, CALCIUM CHLORIDE: 600; 310; 30; 20 INJECTION, SOLUTION INTRAVENOUS at 13:34

## 2019-04-17 RX ADMIN — PROPOFOL 50 MG: 10 INJECTION, EMULSION INTRAVENOUS at 14:54

## 2019-04-17 RX ADMIN — PROPOFOL 50 MG: 10 INJECTION, EMULSION INTRAVENOUS at 14:57

## 2019-04-17 RX ADMIN — PROPOFOL 30 MG: 10 INJECTION, EMULSION INTRAVENOUS at 15:08

## 2019-04-17 RX ADMIN — PROPOFOL 30 MG: 10 INJECTION, EMULSION INTRAVENOUS at 14:56

## 2019-04-17 RX ADMIN — PROPOFOL 30 MG: 10 INJECTION, EMULSION INTRAVENOUS at 15:10

## 2019-04-17 RX ADMIN — PROPOFOL 30 MG: 10 INJECTION, EMULSION INTRAVENOUS at 15:19

## 2019-04-17 RX ADMIN — PROPOFOL 30 MG: 10 INJECTION, EMULSION INTRAVENOUS at 14:53

## 2019-04-17 RX ADMIN — PROPOFOL 50 MG: 10 INJECTION, EMULSION INTRAVENOUS at 15:16

## 2019-04-17 RX ADMIN — PROPOFOL 50 MG: 10 INJECTION, EMULSION INTRAVENOUS at 14:58

## 2019-04-17 RX ADMIN — PROPOFOL 30 MG: 10 INJECTION, EMULSION INTRAVENOUS at 15:05

## 2019-04-17 RX ADMIN — PROPOFOL 30 MG: 10 INJECTION, EMULSION INTRAVENOUS at 15:11

## 2019-04-17 RX ADMIN — PROPOFOL 30 MG: 10 INJECTION, EMULSION INTRAVENOUS at 14:55

## 2019-04-17 RX ADMIN — PROPOFOL 30 MG: 10 INJECTION, EMULSION INTRAVENOUS at 14:59

## 2019-04-17 NOTE — ANESTHESIA PREPROCEDURE EVALUATION
Anesthesia Pre-Procedure Evaluation    Patient: Onel White   MRN:     [id number] Gender:   male   Age:    28 year old :      1990        Preoperative Diagnosis: R/O IBD [facility]   Procedure(s):  COLONOSCOPY WITH CO2 INSUFFLATION  Combined Colonoscopy, Single Or Multiple Biopsy/Polypectomy By Biopsy  Combined Colonoscopy, Remove Tumor/Polyp/Lesion By Snare  Combined Colonoscopy, Submucosal Injection/Tattoo     Past Medical History:   Diagnosis Date     Abdominal pain, generalized      Anemia, unspecified      Diarrhea      Other acne      Ulcerative (chronic) enterocolitis (H) 8/3/2009     Ulcerative colitis     followed by [facility]      Past Surgical History:   Procedure Laterality Date     C APPENDECTOMY,RUPT APPENDX+ABSCESS  2006    peritonitis     COLONOSCOPY            Anesthesia Evaluation     .             ROS/MED HX    ENT/Pulmonary:  - neg pulmonary ROS   (+)asthma , . .    Neurologic:  - neg neurologic ROS     Cardiovascular:  - neg cardiovascular ROS       METS/Exercise Tolerance:     Hematologic:  - neg hematologic  ROS       Musculoskeletal:  - neg musculoskeletal ROS       GI/Hepatic:  - neg GI/hepatic ROS   (+) Inflammatory bowel disease,       Renal/Genitourinary:  - ROS Renal section negative       Endo:  - neg endo ROS       Psychiatric:  - neg psychiatric ROS       Infectious Disease:  - neg infectious disease ROS       Malignancy:      - no malignancy   Other: Comment: Daily marijuana   - neg other ROS                     PHYSICAL EXAM:   Mental Status/Neuro: A/A/O   Airway: Facies: Feasible  Mallampati: I  Mouth/Opening: Full  TM distance: > 6 cm  Neck ROM: Full   Respiratory: Auscultation: CTAB     Resp. Rate: Normal     Resp. Effort: Normal      CV: Rhythm: Regular  Rate: Age appropriate  Heart: Normal Sounds   Comments:      Dental: Normal                  Lab Results   Component Value Date    WBC 8.8 2019    HGB 16.7 2019    HCT  "50.4 03/19/2019     03/19/2019    CRP <2.9 03/19/2019    SED 8 11/05/2012     03/19/2019    POTASSIUM 4.1 03/19/2019    CHLORIDE 103 03/19/2019    CO2 29 03/19/2019    BUN 8 03/19/2019    CR 1.04 03/19/2019     (H) 03/19/2019    SASHA 9.6 03/19/2019    PHOS 4.6 11/09/2009    ALBUMIN 4.4 03/19/2019    PROTTOTAL 8.5 03/19/2019    ALT 98 (H) 03/19/2019    AST 36 03/19/2019    GGT 29 11/10/2010    ALKPHOS 224 (H) 03/19/2019    BILITOTAL 0.8 03/19/2019    PTT 30 04/13/2009    INR 1.00 04/13/2009    TSH 2.01 01/15/2008       Preop Vitals  BP Readings from Last 3 Encounters:   04/17/19 112/65   03/19/19 137/77   03/19/19 131/86    Pulse Readings from Last 3 Encounters:   04/17/19 82   03/19/19 93   03/19/19 85      Resp Readings from Last 3 Encounters:   04/17/19 16   09/27/18 18   03/16/18 16    SpO2 Readings from Last 3 Encounters:   04/17/19 98%   03/19/19 97%   03/19/19 93%      Temp Readings from Last 1 Encounters:   04/17/19 36.3  C (97.4  F) (Temporal)    Ht Readings from Last 1 Encounters:   03/19/19 1.905 m (6' 3\")      Wt Readings from Last 1 Encounters:   03/19/19 87.1 kg (192 lb 1.6 oz)    Estimated body mass index is 24.01 kg/m  as calculated from the following:    Height as of 3/19/19: 1.905 m (6' 3\").    Weight as of 3/19/19: 87.1 kg (192 lb 1.6 oz).     LDA:  Peripheral IV 04/17/19 Right Hand (Active)   Site Assessment WDL 4/17/2019  1:28 PM   Line Status Infusing 4/17/2019  1:28 PM   Phlebitis Scale 0-->no symptoms 4/17/2019  1:28 PM   Infiltration Scale 0 4/17/2019  1:28 PM   Infiltration Site Treatment Method  None 4/17/2019  1:28 PM   Extravasation? No 4/17/2019  1:28 PM   Dressing Intervention New dressing  4/17/2019  1:28 PM   Number of days: 0            Assessment:   ASA SCORE: 2    NPO Status: > 6 hours since completed Solid Foods   Documentation: H&P complete; Preop Testing complete; Consents complete   Proceeding: Proceed without further delay  Tobacco Use:  NO Active use of " Tobacco/UNKNOWN Tobacco use status     Plan:   Anes. Type:  MAC   Pre-Induction: Midazolam IV   Induction:  IV (Standard)   Airway: Native Airway   Access/Monitoring: PIV   Maintenance: Propofol; IV   Emergence: Procedure Site   Logistics: Same Day Surgery     Postop Pain/Sedation Strategy:  Standard-Options: Opioids PRN     PONV Management:  Adult Risk Factors:, Non-Smoker, Postop Opioids  Prevention: Propofol Infusion; Ondansetron     CONSENT: Direct conversation   Plan and risks discussed with: Patient   Blood Products: Consent Deferred (Minimal Blood Loss)                         Maurice Medina MD

## 2019-04-17 NOTE — ANESTHESIA CARE TRANSFER NOTE
Patient: Onel White    Procedure(s):  COLONOSCOPY WITH CO2 INSUFFLATION  Combined Colonoscopy, Single Or Multiple Biopsy/Polypectomy By Biopsy  Combined Colonoscopy, Remove Tumor/Polyp/Lesion By Snare  Combined Colonoscopy, Submucosal Injection/Tattoo    Diagnosis: R/O IBD [facility]  Diagnosis Additional Information: No value filed.    Anesthesia Type:   No value filed.     Note:  Airway :Room Air  Patient transferred to:Phase II  Comments: Awake, comfortable, sats 99%< Report to RN>Handoff Report: Identifed the Patient, Identified the Reponsible Provider, Reviewed the pertinent medical history, Discussed the surgical course, Reviewed Intra-OP anesthesia mangement and issues during anesthesia, Set expectations for post-procedure period and Allowed opportunity for questions and acknowledgement of understanding      Vitals: (Last set prior to Anesthesia Care Transfer)    CRNA VITALS  4/17/2019 1508 - 4/17/2019 1543      4/17/2019             Pulse:  0  (Abnormal)     SpO2:  92 %                Electronically Signed By: CECIL Bonner CRNA  April 17, 2019  3:43 PM

## 2019-04-17 NOTE — ANESTHESIA POSTPROCEDURE EVALUATION
Anesthesia POST Procedure Evaluation    Patient: Onel White   MRN:     [id number] Gender:   male   Age:    28 year old :      1990        Preoperative Diagnosis: R/O IBD [facility]   Procedure(s):  COLONOSCOPY WITH CO2 INSUFFLATION  Combined Colonoscopy, Single Or Multiple Biopsy/Polypectomy By Biopsy  Combined Colonoscopy, Remove Tumor/Polyp/Lesion By Snare  Combined Colonoscopy, Submucosal Injection/Tattoo   Postop Comments: No value filed.       Anesthesia Type:  MAC  No value filed.    Reportable Event: NO     PAIN: Uncomplicated   Sign Out status: Comfortable, Well controlled pain     PONV: No PONV   Sign Out status:  No Nausea or Vomiting     Neuro/Psych: Uneventful perioperative course   Sign Out Status: Preoperative baseline; Age appropriate mentation     Airway/Resp.: Uneventful perioperative course   Sign Out Status: Non labored breathing, age appropriate RR; Resp. Status within EXPECTED Parameters     CV: Uneventful perioperative course   Sign Out status: Appropriate BP and perfusion indices; Appropriate HR/Rhythm     Disposition:   Sign Out in:  PACU  Disposition:  Phase II; Home  Recovery Course: Uneventful  Follow-Up: Not required           Last Anesthesia Record Vitals:  CRNA VITALS  2019 1508 - 2019 1601      2019             Pulse:  0  (Abnormal)     SpO2:  92 %          Last PACU Vitals:  Vitals Value Taken Time   /65 2019  3:40 PM   Temp 36.3  C (97.4  F) 2019  3:40 PM   Pulse 82 2019  3:32 PM   Resp 16 2019  3:40 PM   SpO2 98 % 2019  3:40 PM   Temp src     NIBP 153/122 2019  3:32 PM   Pulse 0 2019  3:37 PM   SpO2 92 % 2019  3:37 PM   Resp     Temp     Ht Rate 81 2019  3:36 PM   Temp 2           Electronically Signed By: Maurice Medina MD, 2019, 4:01 PM

## 2019-04-17 NOTE — TELEPHONE ENCOUNTER
Call from pharmacy to   clinic; requesting alternative medication due to cost   ON luciano for [facility]   gastro  ,Kiesha [place],   paged via   @ 6:03 PM  to call pharmacy direct @ [phone];   Lo Castillo RN  FNA    Order Information     Ordered Status Priority Ordering User Department   19 Sent (none) Liang Hancock MD [facility] OR   Order History   Outpatient   Date/Time Action Taken User Additional Information         19 9639 Sign Liang Hancock MD    Outpatient Medication Detail      Disp Refills Start End LEAH   mesalamine (LIALDA) 1.2 g EC tablet 120 tablet 11 2019 --   Sig - Route: Take 4 tablets (4,800 mg) by mouth daily (with breakfast) - Oral   Sent to pharmacy as: mesalamine (LIALDA) 1.2 g EC tablet   Class: E-Prescribe   Order: [id number]   E-Prescribing Status: Receipt confirmed by pharmacy (2019  5:16 PM CDT)   Printout Tracking     External Result Report   Pharmacy     [address]   Associated Diagnoses     Ulcerative pancolitis with rectal bleeding (H) [K51.011]  - Primary       Source Order Set     Order Set Name Order ID    [id number]     Prescribing Provider's NPI: [id number]  Liang Hancock         [phone];    Reason for Disposition    Pharmacy calling with prescription questions and triager unable to answer question    Protocols used: MEDICATION QUESTION CALL-ADULT-

## 2019-04-18 ENCOUNTER — TELEPHONE (OUTPATIENT)
Dept: GASTROENTEROLOGY | Facility: CLINIC | Age: 29
End: 2019-04-18

## 2019-04-18 NOTE — TELEPHONE ENCOUNTER
Received message below from Dr. Hancock. Will forward message to Alessandra at [facility] infusion to start PA request.     LEVI Pringle

## 2019-04-18 NOTE — TELEPHONE ENCOUNTER
[facility] Call Center    Phone Message    May a detailed message be left on voicemail: no    Reason for Call: Medication Question or concern regarding medication   Prescription Clarification  Name of Medication: LIALDA  Prescribing Provider: Karissa   [facility]: [facility]   What on the order needs clarification? Copay is $525 and they are asking for a substitute.  Please call back. [phone]          Action Taken: Message routed to:  Adult Clinics: Gastroenterology (GI) p [id number]

## 2019-04-18 NOTE — TELEPHONE ENCOUNTER
From: Liang Hancock MD   Sent: 4/18/2019   1:44 PM   To: [facility]     New vedolizumab start.  Please prior auth, have financial determine out of pocket copays and then inform me and the patient when/if its possible to proceed.  Thanks.

## 2019-04-18 NOTE — TELEPHONE ENCOUNTER
4.17.19    [facility] is calling back again for alternative for Lialda.  Please call.   [phone]

## 2019-04-18 NOTE — TELEPHONE ENCOUNTER
[facility] Call Center    Phone Message    May a detailed message be left on voicemail: yes    Reason for Call: Medication Question or concern regarding medication   Prescription Clarification  Name of Medication: mesalamine (LIALDA) 1.2 g EC tablet [[id number]] (Order [id number])    Prescribing Provider: Dr. Hancock   Pharmacy: [place]   What on the order needs clarification? Mother is calling requesting to see what alternatives are available since the copay is expensive.     Action Taken: Message routed to:  Adult Clinics: Gastroenterology (GI) p [id number]

## 2019-04-22 LAB — COPATH REPORT: NORMAL

## 2019-04-24 ENCOUNTER — TELEPHONE (OUTPATIENT)
Dept: GASTROENTEROLOGY | Facility: CLINIC | Age: 29
End: 2019-04-24

## 2019-04-24 NOTE — TELEPHONE ENCOUNTER
LEVI spoke with Leighann (patient's mother). She stated she wanted to touch base with the clinic. She was having some cost concerns with the medication Mesalamine and was having to pay $500 out of pocket fees. She has spoke with [facility] pharmacy in [place] and they will work with the family and allow them to charge the payment and wait for the reimbursement. Therefore, the family wants to continue with the Mesalamine and will be reimbursed the amount charged 100% by insurance as long as it is generic. (reimbursed 80% if brand name)   She stated nothing is needed by the clinic at this time and the [facility] pharmacy will call to get the prescription from [facility].  Will call clinic if needing anything further. Will close encounter.    LEVI Pringle

## 2019-04-24 NOTE — TELEPHONE ENCOUNTER
[facility] Call Center    Phone Message    May a detailed message be left on voicemail: yes    Reason for Call: Other: Pt mom states they have found a pharmacy that will work with them on using this medication mesalamine (LIALDA) 1.2 g EC tablet [[id number]] (Order [id number]).    Pt mom states to please call [phone] to discuss this.       Action Taken: Message routed to:  Adult Clinics: Gastroenterology (GI) p [id number]

## 2019-06-25 ENCOUNTER — OFFICE VISIT (OUTPATIENT)
Dept: GASTROENTEROLOGY | Facility: CLINIC | Age: 29
End: 2019-06-25
Payer: COMMERCIAL

## 2019-06-25 VITALS
DIASTOLIC BLOOD PRESSURE: 75 MMHG | BODY MASS INDEX: 24.11 KG/M2 | WEIGHT: 193.9 LBS | HEIGHT: 75 IN | OXYGEN SATURATION: 96 % | SYSTOLIC BLOOD PRESSURE: 123 MMHG | HEART RATE: 80 BPM

## 2019-06-25 DIAGNOSIS — R79.89 ABNORMAL LFTS: ICD-10-CM

## 2019-06-25 DIAGNOSIS — K51.011 ULCERATIVE PANCOLITIS WITH RECTAL BLEEDING (H): Primary | ICD-10-CM

## 2019-06-25 LAB
ALBUMIN SERPL-MCNC: 3.9 G/DL (ref 3.4–5)
ALP SERPL-CCNC: 116 U/L (ref 40–150)
ALT SERPL W P-5'-P-CCNC: 52 U/L (ref 0–70)
ANION GAP SERPL CALCULATED.3IONS-SCNC: <1 MMOL/L (ref 3–14)
AST SERPL W P-5'-P-CCNC: 42 U/L (ref 0–45)
BASOPHILS # BLD AUTO: 0.1 10E9/L (ref 0–0.2)
BASOPHILS NFR BLD AUTO: 0.9 %
BILIRUB DIRECT SERPL-MCNC: 0.2 MG/DL (ref 0–0.2)
BILIRUB SERPL-MCNC: 0.6 MG/DL (ref 0.2–1.3)
BUN SERPL-MCNC: 11 MG/DL (ref 7–30)
CALCIUM SERPL-MCNC: 8.9 MG/DL (ref 8.5–10.1)
CHLORIDE SERPL-SCNC: 107 MMOL/L (ref 94–109)
CO2 SERPL-SCNC: 34 MMOL/L (ref 20–32)
CREAT SERPL-MCNC: 0.95 MG/DL (ref 0.66–1.25)
CRP SERPL-MCNC: <2.9 MG/L (ref 0–8)
DIFFERENTIAL METHOD BLD: ABNORMAL
EOSINOPHIL # BLD AUTO: 1 10E9/L (ref 0–0.7)
EOSINOPHIL NFR BLD AUTO: 10.6 %
ERYTHROCYTE [DISTWIDTH] IN BLOOD BY AUTOMATED COUNT: 12.5 % (ref 10–15)
GFR SERPL CREATININE-BSD FRML MDRD: >90 ML/MIN/{1.73_M2}
GLUCOSE SERPL-MCNC: 68 MG/DL (ref 70–99)
HCT VFR BLD AUTO: 44.5 % (ref 40–53)
HGB BLD-MCNC: 14.4 G/DL (ref 13.3–17.7)
IMM GRANULOCYTES # BLD: 0 10E9/L (ref 0–0.4)
IMM GRANULOCYTES NFR BLD: 0.2 %
LYMPHOCYTES # BLD AUTO: 2 10E9/L (ref 0.8–5.3)
LYMPHOCYTES NFR BLD AUTO: 20.5 %
MCH RBC QN AUTO: 30.3 PG (ref 26.5–33)
MCHC RBC AUTO-ENTMCNC: 32.4 G/DL (ref 31.5–36.5)
MCV RBC AUTO: 94 FL (ref 78–100)
MONOCYTES # BLD AUTO: 1 10E9/L (ref 0–1.3)
MONOCYTES NFR BLD AUTO: 9.8 %
NEUTROPHILS # BLD AUTO: 5.7 10E9/L (ref 1.6–8.3)
NEUTROPHILS NFR BLD AUTO: 58 %
PLATELET # BLD AUTO: 277 10E9/L (ref 150–450)
POTASSIUM SERPL-SCNC: 3.8 MMOL/L (ref 3.4–5.3)
PROT SERPL-MCNC: 7.2 G/DL (ref 6.8–8.8)
RBC # BLD AUTO: 4.76 10E12/L (ref 4.4–5.9)
SODIUM SERPL-SCNC: 141 MMOL/L (ref 133–144)
WBC # BLD AUTO: 9.7 10E9/L (ref 4–11)

## 2019-06-25 PROCEDURE — 82248 BILIRUBIN DIRECT: CPT | Performed by: INTERNAL MEDICINE

## 2019-06-25 PROCEDURE — 36415 COLL VENOUS BLD VENIPUNCTURE: CPT | Performed by: INTERNAL MEDICINE

## 2019-06-25 PROCEDURE — 80053 COMPREHEN METABOLIC PANEL: CPT | Performed by: INTERNAL MEDICINE

## 2019-06-25 PROCEDURE — 85025 COMPLETE CBC W/AUTO DIFF WBC: CPT | Performed by: INTERNAL MEDICINE

## 2019-06-25 PROCEDURE — 86140 C-REACTIVE PROTEIN: CPT | Performed by: INTERNAL MEDICINE

## 2019-06-25 PROCEDURE — 99213 OFFICE O/P EST LOW 20 MIN: CPT | Performed by: INTERNAL MEDICINE

## 2019-06-25 ASSESSMENT — PAIN SCALES - GENERAL: PAINLEVEL: NO PAIN (0)

## 2019-06-25 ASSESSMENT — MIFFLIN-ST. JEOR: SCORE: 1940.15

## 2019-06-25 NOTE — NURSING NOTE
"Onel White's goals for this visit include:   Chief Complaint   Patient presents with     RECHECK     Ulcerative colitis        He requests these members of his care team be copied on today's visit information: no, does not currently have a PCP     PCP: No Ref-Primary, Physician    Referring Provider:  Referred Self, MD  No address on file    /75   Pulse 80   Ht 1.905 m (6' 3\")   Wt 88 kg (193 lb 14.4 oz)   SpO2 96%   BMI 24.24 kg/m      Do you need any medication refills at today's visit? Yes, Mesalamine and Prednisone possibly     Mersadiez Alvarez, CMA      "

## 2019-06-25 NOTE — PROGRESS NOTES
GASTROENTEROLOGY FOLLOW UP CLINIC VISIT    CC/REFERRING MD:    No Ref-Primary, Physician  Referred Self    REASON FOR CONSULTATION:   Referred Self for   Chief Complaint   Patient presents with     RECHECK     Ulcerative colitis        HISTORY OF PRESENT ILLNESS:    Onel White is 28 year old male who presents for follow up of ulcerative colitis.  He has ulcerative colitis since 2009 currently being maintained on mesalamine 4.8 g/day.  He previously was seen in this clinic in March 2019.  At that time, he was having 5 bowel movements per day along with a significant amount of abdominal pain.  He had an evaluation after that visit which noted a significantly elevated fecal calprotectin.  Colonoscopy noted confluent inflammation from the rectum to the cecum.  The endoscopic impression was moderate to severe ulcerative pancolitis.  Biopsies noted mild to moderate active colitis throughout the colon.  The ileum also had mild inflammatory activity.  Following that procedure, he was given a prednisone taper along with the mesalamine and prior authorization for vedolizumab was initiated.  He did the prednisone taper as well as continued mesalamine but has not started vedolizumab.  He reports that he is improved significantly and is now back to his baseline.  He is having one formed stool daily and this does not have any blood.  He no longer has any abdominal pain.  He notes that his weight is stable.  He has no other concerns at this visit today.    PERTINENT PAST MEDICAL HISTORY:    Past Medical History:   Diagnosis Date     Abdominal pain, generalized      Anemia, unspecified      Diarrhea      Other acne      Ulcerative (chronic) enterocolitis (H) 8/3/2009     Ulcerative colitis     followed by [facility], [facility]       PREVIOUS SURGERIES:   Past Surgical History:   Procedure Laterality Date     C APPENDECTOMY,RUPT APPENDX+ABSCESS  2006    peritonitis     COLONOSCOPY       COLONOSCOPY N/A  4/17/2019    Procedure: Combined Colonoscopy, Single Or Multiple Biopsy/Polypectomy By Biopsy;  Surgeon: Liang Hancock MD;  Location: [facility] OR     COLONOSCOPY WITH CO2 INSUFFLATION N/A 4/17/2019    Procedure: COLONOSCOPY WITH CO2 INSUFFLATION;  Surgeon: Liang Hancock MD;  Location: [facility] OR       ALLERGIES:     Allergies   Allergen Reactions     Wheat        PERTINENT MEDICATIONS:    Current Outpatient Medications:      albuterol (2.5 MG/3ML) 0.083% neb solution, NEBULIZE THE CONTENTS OF 1 VIAL BY MOUTH EVERY 6 HOURS AS NEEDED FOR SHORTNESS OF BREATH / DYSPNEA OR WHEEZING, Disp: 180 mL, Rfl: 11     albuterol (PROAIR HFA/PROVENTIL HFA/VENTOLIN HFA) 108 (90 Base) MCG/ACT inhaler, Inhale 2 puffs into the lungs 4 times daily, Disp: 8.5 g, Rfl: 0     albuterol (PROAIR HFA/PROVENTIL HFA/VENTOLIN HFA) 108 (90 Base) MCG/ACT inhaler, Inhale 2 puffs into the lungs every 6 hours as needed for shortness of breath / dyspnea or wheezing, Disp: 3 Inhaler, Rfl: 1     fluticasone-vilanterol (BREO ELLIPTA) 100-25 MCG/INH inhaler, INHALE ONE PUFF INTO THE LUNGS DAILY, Disp: 3 Inhaler, Rfl: 1     albuterol (PROVENTIL) (2.5 MG/3ML) 0.083% neb solution, Take 1 vial (2.5 mg) by nebulization 4 times daily for 14 days, Disp: 56 vial, Rfl: 0     mesalamine (LIALDA) 1.2 g EC tablet, Take 4 tablets (4,800 mg) by mouth daily (with breakfast) (Patient not taking: Reported on 6/25/2019), Disp: 120 tablet, Rfl: 11     order for DME, Equipment being ordered: patellar stabilization brace with horseshoe, large, Disp: 1 Device, Rfl: 0     predniSONE (DELTASONE) 5 MG tablet, Pred 40mg daily for 2 weeks, then pred 30/20/15/10/5mg daily for 1 week each, then pred 5mg every other day for 1 week until off.. (Patient not taking: Reported on 6/25/2019), Disp: 228 tablet, Rfl: 0    FAMILY HISTORY:   Family History   Problem Relation Age of Onset     Gastrointestinal Disease Father         crohns/ulceratice colitis     Asthma Father       "Diabetes Paternal Grandmother           ROS:    No fevers or chills  No weight loss  No blurry vision, double vision or change in vision  No sore throat  No lymphadenopathy  No headache, paraesthesias, or weakness in a limb  No shortness of breath or wheezing  No chest pain or pressure  No arthralgias or myalgias  No rashes or skin changes  No odynophagia or dysphagia  No BRBPR, hematochezia, melena  No dysuria, frequency or urgency  No hot/cold intolerance or polyria  No anxiety or depression  PHYSICAL EXAMINATION:  Constitutional: aaox3, cooperative, pleasant, not dyspneic/diaphoretic, no acute distress  Vitals reviewed: /75   Pulse 80   Ht 1.905 m (6' 3\")   Wt 88 kg (193 lb 14.4 oz)   SpO2 96%   BMI 24.24 kg/m    Wt:   Wt Readings from Last 2 Encounters:   06/25/19 88 kg (193 lb 14.4 oz)   03/19/19 87.1 kg (192 lb 1.6 oz)      Eyes: Sclera anicteric/injected  Ears/nose/mouth/throat: Normal oropharynx without ulcers or exudate, mucus membranes moist, hearing intact  Neck: supple, thyroid normal size  CV: No edema  Respiratory: Unlabored breathing  Lymph: No submandibular, supraclavicular or inguinal lymphadenopathy  Abd: Nondistended, no masses, +bs, no hepatosplenomegaly, nontender, no peritoneal signs  Skin: warm, perfused, no jaundice  Psych: Normal affect  MSK: Normal gait      PERTINENT STUDIES:   Most recent CBC:  Recent Labs   Lab Test 06/25/19  1415 03/19/19  1015   WBC 9.7 8.8   HGB 14.4 16.7   HCT 44.5 50.4    276     Most recent hepatic panel:  Recent Labs   Lab Test 06/25/19  1415 03/19/19  1015   ALT 52 98*   AST 42 36     Most recent creatinine:  Recent Labs   Lab Test 06/25/19  1415 03/19/19  1015   CR 0.95 1.04         ASSESSMENT/PLAN:    Onel White is a 28 year old male who presents for follow up of ulcerative pancolitis.  He is now off of his prednisone taper and is being maintained on mesalamine 4.8 g daily.  He is in clinical remission at present.  I have recommended that " we get updated lab studies and inflammatory markers from the blood in stool in order to get some objective evidence that his colitis is back in remission.  We can hold off on initiating biologic therapy at this point given his significant improvement.  We will plan to continue monitoring at this point of prednisone but if his symptoms worsen again then vedolizumab will be initiated.  If he does not end up initiating biologic therapy, we may need to do a flexible sigmoidoscopy at some point in order to confirm endoscopic remission depending on his clinical course.  Though the biopsies did show some inflammation in the ileum, this may be backwash ileitis from his colonic disease.  Depending on his clinical course, we may consider obtaining a one-time CT or MR enterography in order to ensure that this is not actually Crohn's disease with upstream ileal involvement    Disease/Date of Dx:  UC 2009  Extent/Location:   pancolitis  Activity:  Clinical remission.  Moderate to severe activity on colonoscopy 4/2019  Last Colonoscopy: 4/2019.  Ortega 1 in rectum and sigmoid, Ortega 2 throughout the rest to the cecum  Last MRE/CTE: none  Extraintestinal Manifestations:  none  Past Surgery:   none    Past Medications:  Sulfasalazine, steroids  Current Medications:  Mesalamine (Lialda) 4.8g/day    History of tobacco:  no  History of c-diff:  no    Pre-Biologic/Immunomodulator:   Last TB quant (none), last Hep B status (none checked),  Last Vit D (12 in 2019 ), Last DEXA (none)    Drug Levels:  Biologics: none checked  Prior TPMPT (none checked), prior thiopurine level (none checked)      PREVENTIVE CARE IN INFLAMMATORY BOWEL DISEASE    - Strongly recommend tobacco abstinence.    - Recommend considering daily calcium + Vitamin D supplementation.    - Recommend age-appropriate cancer surveillance:  - Yearly Dermatology visit for visual skin inspection if immunosuppressed, monthly skin self-check after bathing.  - (For men and women ages  9-26) Consideration for HPV vaccination, should be discussed with your PCP further if you feel you'd like to pursue this.  - (For women, men with risk factors) Annual Pap smears if immunosuppressed, mammogram when deemed appropriate by your PCP.    - Recommend follow-up with your PCP to ensure the following vaccinations have been updated: Hepatitis A/B, Tdap, Pneumococcal pneumonia, yearly flu SHOT, Meningococcal meningitis (if college-age), HPV (all aged 18-26), etc.  - Would also recommend VZV and MMR titers be checked to confirm immunity.  - Live/attenuated vaccines (such as the INTRANASAL flu vaccine, MMR, Yellow Fever, or Zostavax vaccine) should not be administered to immunosuppressed patients, except in very specific instances which you should discuss with a GI and Infectious Disease provider first.    - Family planning:  If you or your partner are planning to become pregnant, please schedule time with us to discuss issues surrounding IBD and Fertility/Pregnancy.    -Avoid NSAIDS      Ulcerative pancolitis with rectal bleeding (H)  Abnormal LFTs  Orders Placed This Encounter   Procedures     CRP inflammation     Standing Status:   Future     Number of Occurrences:   1     Standing Expiration Date:   6/24/2020     Calprotectin Feces     Standing Status:   Future     Standing Expiration Date:   6/24/2020     Comprehensive metabolic panel     Standing Status:   Future     Number of Occurrences:   1     Standing Expiration Date:   6/24/2020     CBC with platelets differential     Last Lab Result: Hemoglobin (g/dL)       Date                     Value                 03/19/2019               16.7             ----------     Standing Status:   Future     Number of Occurrences:   1     Standing Expiration Date:   6/24/2020     Bilirubin direct       RTC 6 months    Thank you for this consultation.  It was a pleasure to participate in the care of this patient; please contact us with any further questions.      This  note was created with voice recognition software, and while reviewed for accuracy, typos may remain.     Liang Hancock MD  Adjunct  of Medicine  Division of Gastroenterology, Hepatology and Nutrition  [facility]  [phone]

## 2019-07-01 DIAGNOSIS — J45.41 MODERATE PERSISTENT ASTHMA WITH ACUTE EXACERBATION: ICD-10-CM

## 2019-07-02 RX ORDER — PREDNISONE 20 MG/1
TABLET ORAL
Qty: 10 TABLET | Refills: 1 | Status: SHIPPED | OUTPATIENT
Start: 2019-07-02 | End: 2020-05-07 | Stop reason: ALTCHOICE

## 2019-07-02 NOTE — TELEPHONE ENCOUNTER
"prednisone  Last Written Prescription Date:  4/17/2019  Last Fill Quantity: 228,  # refills: 0   Last office visit: 9/27/2018 with prescribing provider:      Future Office Visit:      Requested Prescriptions   Pending Prescriptions Disp Refills     predniSONE (DELTASONE) 20 MG tablet [Pharmacy Med Name: PREDNISONE 20MG TABS] 10 tablet 1     Sig: TAKE TWO TABLETS BY MOUTH EVERY DAY       There is no refill protocol information for this order          Routing refill request to provider for review/approval because:  [place] [facility] [place]   Noted as \"Patient takes as needed for pulmonary symptoms\"  Hx Moderate persistent asthma with acute exacerbation    9/27/2018 DR Hinds:  PLAN:  We will refill his medications.  He also keep some prednisone on hand to take in short bursts of 5 days of 40 mg if he has flareups.       Shara Hinds RN on 7/2/2019 at 12:47 PM    "

## 2019-08-26 ENCOUNTER — TELEPHONE (OUTPATIENT)
Dept: FAMILY MEDICINE | Facility: CLINIC | Age: 29
End: 2019-08-26

## 2019-08-26 NOTE — TELEPHONE ENCOUNTER
Panel Management Review      Patient has the following on his problem list:     Asthma review     ACT Total Scores 9/27/2018   ACT TOTAL SCORE -   ASTHMA ER VISITS -   ASTHMA HOSPITALIZATIONS -   ACT TOTAL SCORE (Goal Greater than or Equal to 20) 19   In the past 12 months, how many times did you visit the emergency room for your asthma without being admitted to the hospital? 1   In the past 12 months, how many times were you hospitalized overnight because of your asthma? 0      1. Is Asthma diagnosis on the Problem List? Yes    2. Is Asthma listed on Health Maintenance? Yes    3. Patient is due for:  ACT and AAP      Composite cancer screening  Chart review shows that this patient is due/due soon for the following ACT/AAP  Summary:    Patient is due/failing the following:   AAP and ACT    Action needed:   Patient needs to do ACT. and AAP.    Type of outreach:    Sent letter. and Copy of ACT mailed to patient, will reach out in 5 days.    Questions for provider review:    None                                                                                                                                    Cammie Lewis on 8/26/2019 at 3:15 PM       Chart routed to none .

## 2019-08-26 NOTE — LETTER
[address]   [address]  Tel. [phone] / Fax [phone]    August 26, 2019        Onel White  [address] [address] [place], [place] [address]E [facility] [address]          Dear Onel,    Enclosed you will find a questionnaire regarding your asthma as well as an asthma symptom hand out. Please fill out the questionnaire and mail back to Dr. Hinds's office using the pre paid postage envelope included in this letter. Thank you for your time.        Sincerely,                Jimmy Hinds MD

## 2019-08-26 NOTE — LETTER
My Asthma Action Plan    Name: Onel White   YOB: 1990  Date: 8/26/2019   My doctor: Physician No Ref-Primary   My clinic: [facility]        My Control Medicine: Fluticasone furoate + vilanterol (Breo Ellipta)  -  100/25mcg .  My Rescue Medicine: Albuterol (Proair/Ventolin/Proventil HFA) 2-4 puffs EVERY 4 HOURS as needed. Use a spacer if recommended by your provider.  My Oral Steroid Medicine: none My Asthma Severity:   Moderate Persistent  Know your asthma triggers: .  pollens  mold  forest fires            GREEN ZONE   Good Control    I feel good    No cough or wheeze    Can work, sleep and play without asthma symptoms       Take your asthma control medicine every day.     1. If exercise triggers your asthma, take your rescue medication    15 minutes before exercise or sports, and    During exercise if you have asthma symptoms  2. Spacer to use with inhaler: If you have a spacer, make sure to use it with your inhaler             YELLOW ZONE Getting Worse  I have ANY of these:    I do not feel good    Cough or wheeze    Chest feels tight    Wake up at night   1. Keep taking your Green Zone medications  2. Start taking your rescue medicine:    every 20 minutes for up to 1 hour. Then every 4 hours for 24-48 hours.  3. If you stay in the Yellow Zone for more than 12-24 hours, contact your doctor.  4. If you do not return to the Green Zone in 12-24 hours or you get worse, start taking your oral steroid medicine if prescribed by your provider.           RED ZONE Medical Alert - Get Help  I have ANY of these:    I feel awful    Medicine is not helping    Breathing getting harder    Trouble walking or talking    Nose opens wide to breathe       1. Take your rescue medicine NOW  2. If your provider has prescribed an oral steroid medicine, start taking it NOW  3. Call your doctor NOW  4. If you are still in the Red Zone after 20 minutes and you have not reached your doctor:    Take your  rescue medicine again and    Call 911 or go to the emergency room right away    See your regular doctor within 2 weeks of an Emergency Room or Urgent Care visit for follow-up treatment.          Annual Reminders:  Meet with Asthma Educator,  Flu Shot in the Fall, consider Pneumonia Vaccination for patients with asthma (aged 19 and older).    Pharmacy:    [facility] [place], [place] - [address] [facility] [place] - [address] [facility] [place], [place] [address] [facility] [address] [place] [address] [address] [place] [address]  [id number] - [place], [place] - [address] [place] [address]                          Asthma Triggers  How To Control Things That Make Your Asthma Worse    Triggers are things that make your asthma worse.  Look at the list below to help you find your triggers and what you can do about them.  You can help prevent asthma flare-ups by staying away from your triggers.      Trigger                                                          What you can do   Cigarette Smoke  Tobacco smoke can make asthma worse. Do not allow smoking in your home, car or around you.  Be sure no one smokes at a child s day care or school.  If you smoke, ask your health care provider for ways to help you quit.  Ask family members to quit too.  Ask your health care provider for a referral to Quit Plan to help you quit smoking, or call 6-759-001-PLAN.     Colds, Flu, Bronchitis  These are common triggers of asthma. Wash your hands often.  Don t touch your eyes, nose or mouth.  Get a flu shot every year.     Dust Mites  These are tiny bugs that live in cloth or carpet. They are too small to see. Wash sheets and blankets in hot water every week.   Encase pillows and mattress in dust mite proof covers.  Avoid having carpet if you can. If you have carpet, vacuum weekly.   Use a dust mask and HEPA vacuum.   Pollen and Outdoor Mold  Some people are allergic to trees, grass, or  weed pollen, or molds. Try to keep your windows closed.  Limit time out doors when pollen count is high.   Ask you health care provider about taking medicine during allergy season.     Animal Dander  Some people are allergic to skin flakes, urine or saliva from pets with fur or feathers. Keep pets with fur or feathers out of your home.    If you can t keep the pet outdoors, then keep the pet out of your bedroom.  Keep the bedroom door closed.  Keep pets off cloth furniture and away from stuffed toys.     Mice, Rats, and Cockroaches   Some people are allergic to the waste from these pests.   Cover food and garbage.  Clean up spills and food crumbs.  Store grease in the refrigerator.   Keep food out of the bedroom.   Indoor Mold  This can be a trigger if your home has high moisture. Fix leaking faucets, pipes, or other sources of water.   Clean moldy surfaces.  Dehumidify basement if it is damp and smelly.   Smoke, Strong Odors, and Sprays  These can reduce air quality. Stay away from strong odors and sprays, such as perfume, powder, hair spray, paints, smoke incense, paint, cleaning products, candles and new carpet.   Exercise or Sports  Some people with asthma have this trigger. Be active!  Ask your doctor about taking medicine before sports or exercise to prevent symptoms.    Warm up for 5-10 minutes before and after sports or exercise.     Other Triggers of Asthma  Cold air:  Cover your nose and mouth with a scarf.  Sometimes laughing or crying can be a trigger.  Some medicines and food can trigger asthma.

## 2019-09-20 NOTE — PROGRESS NOTES
[facility] Progress Note        Antwan Michael MD, MPH  03/19/2019        History:      Onel White is a pleasant 28 year old year old male with a chief complaint of cough and sometimes wheezing w slight amount of whitish sputum since 3 weeks ago.   No fever or chills.   No dyspnea or chest pain.   Non-somker.   No headache or neck pain.  No GI or  symptoms.   No MSK symptoms.         Assessment and Plan:          Acute bronchitis with symptoms greater than 10 days:    - albuterol (PROAIR HFA/PROVENTIL HFA/VENTOLIN HFA) 108 (90 Base) MCG/ACT inhaler; Inhale 2 puffs into the lungs 4 times daily  Dispense: 8.5 g; Refill: 0  - albuterol (PROVENTIL) (2.5 MG/3ML) 0.083% neb solution; Take 1 vial (2.5 mg) by nebulization 4 times daily for 14 days  Dispense: 56 vial; Refill: 0  Discussed supportive care with the patient  Advised to increase fluid intake and rest.  F/u w PCP in 4-5 days, earlier if symptoms worsen.                   Physical Exam:      /77   Pulse 93   Temp 98.2  F (36.8  C) (Temporal)   SpO2 97%      Constitutional: Patient is in no distress The patient is pleasant and cooperative.   HEENT: Head:  Head is atraumatic, normocephalic.    Eyes: Pupils are equal, round and reactive to light and accomodation.  Sclera is non-icteric. No conjunctival injection, or exudate noted. Extraocular motion is intact. Visual acuity is intact bilaterally.  Ears:  External acoustic canals are patent and clear.  There is no erythema and bulging( exudate)  of the ( R/L ) tympanic membrane(s ).   Nose:  Nasal congestion w/o drainage or mucosal ulceration is noted.  Throat:  Oral mucosa is moist.  No oral lesions are noted. Posterior pharyngeal hyperemia w/o exudate noted.     Neck Supple.  There is no cervical lymphadenopathy.  No nuchal rigidity noted.  There is no meningismus.     Cardiovascular: Heart is regular to rate and rhythm.  No murmur is noted.     Lungs: Clear in the anterior and  OBJECTIVE:   Physical Exam   Constitutional: He appears well-developed and well-nourished. He is active. No distress. HENT:   Head: Atraumatic. Right Ear: Tympanic membrane normal.   Left Ear: Tympanic membrane normal.   Nose: Nose normal. No nasal discharge. Mouth/Throat: Mucous membranes are moist. Dentition is normal. Oropharynx is clear. Eyes: Pupils are equal, round, and reactive to light. EOM are normal. Right eye exhibits no discharge. Left eye exhibits no discharge. Neck: Normal range of motion. Neck supple. No neck adenopathy. Cardiovascular: Normal rate, regular rhythm, S1 normal and S2 normal. Pulses are palpable. No murmur heard. Pulmonary/Chest: Effort normal and breath sounds normal. There is normal air entry. No respiratory distress. He has no wheezes. He has no rhonchi. Abdominal: Soft. Bowel sounds are normal. There is no tenderness. There is no guarding. Genitourinary:   Genitourinary Comments: Matt stage    Musculoskeletal: Normal range of motion. He exhibits no edema, deformity or signs of injury. Neurological: He is alert. He exhibits normal muscle tone. Coordination normal.   Skin: Skin is warm and dry. He is not diaphoretic. No cyanosis. No pallor. Nursing note and vitals reviewed. ASSESSMENT:     ICD-10-CM    1. Encounter for routine child health examination without abnormal findings Z00.129    2. Bed wetting N39.44 [facility]         PLAN:   Plan per orders. Counseling regarding the following: school issues. Follow up as needed. 1. Anticipatory guidance: Specific topics reviewed: whole milk till 3years old then taper to lowfat or skim, minimize junk food, reading together; limiting TV; media violence and teaching child name, address, and phone number. 2. Screening tests:   a. Venous lead level: no (CDC/AAP recommends if at risk and never done previously)    b.  Hb or HCT (CDC recommends annually through age 11 years for children at risk; AAP posterior pulmonary fields.   Abdomen: Soft and non-tender.    Back No flank tenderness is noted.   Extremeties No edema, no calf tenderness.   Neuro: No focal deficit.   Skin No petechiae or purpura is noted.  There is no rash.   Mood Normal              Data:      All new lab and imaging data was reviewed.   Results for orders placed or performed in visit on 03/19/19   Comprehensive metabolic panel   Result Value Ref Range    Sodium 140 133 - 144 mmol/L    Potassium 4.1 3.4 - 5.3 mmol/L    Chloride 103 94 - 109 mmol/L    Carbon Dioxide 29 20 - 32 mmol/L    Anion Gap 8 3 - 14 mmol/L    Glucose 105 (H) 70 - 99 mg/dL    Urea Nitrogen 8 7 - 30 mg/dL    Creatinine 1.04 0.66 - 1.25 mg/dL    GFR Estimate >90 >60 mL/min/[1.73_m2]    GFR Estimate If Black >90 >60 mL/min/[1.73_m2]    Calcium 9.6 8.5 - 10.1 mg/dL    Bilirubin Total 0.8 0.2 - 1.3 mg/dL    Albumin 4.4 3.4 - 5.0 g/dL    Protein Total 8.5 6.8 - 8.8 g/dL    Alkaline Phosphatase 224 (H) 40 - 150 U/L    ALT 98 (H) 0 - 70 U/L    AST 36 0 - 45 U/L   CBC with platelets differential   Result Value Ref Range    WBC 8.8 4.0 - 11.0 10e9/L    RBC Count 5.46 4.4 - 5.9 10e12/L    Hemoglobin 16.7 13.3 - 17.7 g/dL    Hematocrit 50.4 40.0 - 53.0 %    MCV 92 78 - 100 fl    MCH 30.6 26.5 - 33.0 pg    MCHC 33.1 31.5 - 36.5 g/dL    RDW 12.5 10.0 - 15.0 %    Platelet Count 276 150 - 450 10e9/L    % Neutrophils 87.8 %    % Lymphocytes 6.7 %    % Monocytes 1.5 %    % Eosinophils 2.8 %    % Basophils 0.6 %    % Immature Granulocytes 0.6 %    Absolute Neutrophil 7.7 1.6 - 8.3 10e9/L    Absolute Lymphocytes 0.6 (L) 0.8 - 5.3 10e9/L    Absolute Monocytes 0.1 0.0 - 1.3 10e9/L    Absolute Eosinophils 0.3 0.0 - 0.7 10e9/L    Absolute Basophils 0.1 0.0 - 0.2 10e9/L    Abs Immature Granulocytes 0.1 0 - 0.4 10e9/L    Diff Method Automated Method    CRP inflammation   Result Value Ref Range    CRP Inflammation <2.9 0.0 - 8.0 mg/L

## 2019-09-28 ENCOUNTER — HEALTH MAINTENANCE LETTER (OUTPATIENT)
Age: 29
End: 2019-09-28

## 2019-10-04 DIAGNOSIS — J45.41 MODERATE PERSISTENT ASTHMA WITH ACUTE EXACERBATION: ICD-10-CM

## 2019-10-04 NOTE — TELEPHONE ENCOUNTER
"Routing refill request to provider for review/approval because:  Labs out of range:  ACT  Labs not current:  ACT  Patient needs to be seen because it has been more than 1 year since last office visit.  T'd up 1 month for provider review.    Will forward to schedulers to schedule patient for yearly routine office visit.    Requested Prescriptions   Pending Prescriptions Disp Refills     BREO ELLIPTA 100-25 MCG/INH inhaler [Pharmacy Med Name: BREO ELLIPTA 100-25MCG/INH AEPB]  1     Sig: INHALE ONE PUFF BY MOUTH EVERY DAY   Last Written Prescription Date:  9/27/20198  Last Fill Quantity: 3 inhaler,  # refills: 1   Last office visit: 9/27/2018 with prescribing provider:     Future Office Visit:   Next 5 appointments (look out 90 days)    Dec 17, 2019  3:30 PM CST  Return Visit with Liang Hancock MD  [facility] ([facility]) [address] [place] [id number] [phone]             Inhaled Steroids Protocol Failed - 10/4/2019 12:25 PM        Failed - Asthma control assessment score within normal limits in last 6 months     Please review ACT score.   ACT Total Scores 12/19/2017 3/16/2018 9/27/2018   ACT TOTAL SCORE - - -   ASTHMA ER VISITS - - -   ASTHMA HOSPITALIZATIONS - - -   ACT TOTAL SCORE (Goal Greater than or Equal to 20) 22 12 19   In the past 12 months, how many times did you visit the emergency room for your asthma without being admitted to the hospital? 2 0 1   In the past 12 months, how many times were you hospitalized overnight because of your asthma? 1 0 0             Failed - Recent (6 mo) or future (30 days) visit within the authorizing provider's specialty     Patient had office visit in the last 6 months or has a visit in the next 30 days with authorizing provider or within the authorizing provider's specialty.  See \"Patient Info\" tab in inbasket, or \"Choose Columns\" in Meds & Orders section of the refill encounter.            Passed - " Patient is age 12 or older        Passed - Medication is active on med list      Yasmin Candelario RN

## 2019-12-17 ENCOUNTER — OFFICE VISIT (OUTPATIENT)
Dept: GASTROENTEROLOGY | Facility: CLINIC | Age: 29
End: 2019-12-17
Payer: COMMERCIAL

## 2019-12-17 VITALS
WEIGHT: 202.9 LBS | BODY MASS INDEX: 25.36 KG/M2 | RESPIRATION RATE: 16 BRPM | TEMPERATURE: 97.8 F | OXYGEN SATURATION: 98 % | HEART RATE: 76 BPM | DIASTOLIC BLOOD PRESSURE: 73 MMHG | SYSTOLIC BLOOD PRESSURE: 113 MMHG

## 2019-12-17 DIAGNOSIS — K51.011 ULCERATIVE PANCOLITIS WITH RECTAL BLEEDING (H): Primary | ICD-10-CM

## 2019-12-17 PROCEDURE — 99214 OFFICE O/P EST MOD 30 MIN: CPT | Performed by: INTERNAL MEDICINE

## 2019-12-17 ASSESSMENT — PAIN SCALES - GENERAL: PAINLEVEL: NO PAIN (0)

## 2019-12-17 NOTE — NURSING NOTE
Onel White's goals for this visit include:   Chief Complaint   Patient presents with     RECHECK     6 month f/u       He requests these members of his care team be copied on today's visit information: Yes    PCP: No Ref-Primary, Physician    Referring Provider:  No referring provider defined for this encounter.    /73 (BP Location: Left arm, Patient Position: Sitting, Cuff Size: Adult Large)   Pulse 76   Temp 97.8  F (36.6  C) (Oral)   Resp 16   Wt 92 kg (202 lb 14.4 oz)   SpO2 98%   BMI 25.36 kg/m      Do you need any medication refills at today's visit? No    Pavan Irvin, CMA

## 2019-12-17 NOTE — PROGRESS NOTES
GASTROENTEROLOGY FOLLOW UP CLINIC VISIT      REASON FOR CONSULTATION:   No ref. provider found for   Chief Complaint   Patient presents with     RECHECK     6 month f/u       HISTORY OF PRESENT ILLNESS:    Onel White is 29 year old male who presents for follow up of ulcerative pancolitis.  He has ulcerative pancolitis which was diagnosed in 2009.  Current therapy is Lialda 4.8 g/day.  He follows up for further evaluation.  He was last seen in the office June 2019.  He notes that he is feeling very well.  He has no abdominal pain.  He is having formed stools daily.  He does occasionally note small amounts of red blood while wiping.  He notes that this is different than his typical symptoms of colitis when he previously had significantly more pain.  He does report that he has not been taking his Lialda regularly.  He notes that he is really only using it 1 or 2 days/week.  Weight is stable.  He reports that he has no other significant health issues at this time.    PERTINENT PAST MEDICAL HISTORY:    Past Medical History:   Diagnosis Date     Abdominal pain, generalized      Anemia, unspecified      Diarrhea      Other acne      Ulcerative (chronic) enterocolitis (H) 8/3/2009     Ulcerative colitis     followed by [facility], [facility]       PREVIOUS SURGERIES:   Past Surgical History:   Procedure Laterality Date     C APPENDECTOMY,RUPT APPENDX+ABSCESS  2006    peritonitis     COLONOSCOPY       COLONOSCOPY N/A 4/17/2019    Procedure: Combined Colonoscopy, Single Or Multiple Biopsy/Polypectomy By Biopsy;  Surgeon: Liang Hancock MD;  Location: [facility] OR     COLONOSCOPY WITH CO2 INSUFFLATION N/A 4/17/2019    Procedure: COLONOSCOPY WITH CO2 INSUFFLATION;  Surgeon: Liang Hancock MD;  Location: [facility] OR       ALLERGIES:     Allergies   Allergen Reactions     Wheat        PERTINENT MEDICATIONS:    Current Outpatient Medications:      albuterol (PROAIR HFA/PROVENTIL HFA/VENTOLIN  HFA) 108 (90 Base) MCG/ACT inhaler, Inhale 2 puffs into the lungs every 6 hours as needed for shortness of breath / dyspnea or wheezing, Disp: 3 Inhaler, Rfl: 1     BREO ELLIPTA 100-25 MCG/INH inhaler, INHALE ONE PUFF BY MOUTH EVERY DAY, Disp: 1 Inhaler, Rfl: 0     mesalamine (LIALDA) 1.2 g EC tablet, Take 4 tablets (4,800 mg) by mouth daily (with breakfast), Disp: 120 tablet, Rfl: 11     albuterol (2.5 MG/3ML) 0.083% neb solution, NEBULIZE THE CONTENTS OF 1 VIAL BY MOUTH EVERY 6 HOURS AS NEEDED FOR SHORTNESS OF BREATH / DYSPNEA OR WHEEZING (Patient not taking: Reported on 12/17/2019), Disp: 180 mL, Rfl: 11     albuterol (PROAIR HFA/PROVENTIL HFA/VENTOLIN HFA) 108 (90 Base) MCG/ACT inhaler, Inhale 2 puffs into the lungs 4 times daily (Patient not taking: Reported on 12/17/2019), Disp: 8.5 g, Rfl: 0     albuterol (PROVENTIL) (2.5 MG/3ML) 0.083% neb solution, Take 1 vial (2.5 mg) by nebulization 4 times daily for 14 days, Disp: 56 vial, Rfl: 0     order for DME, Equipment being ordered: patellar stabilization brace with horseshoe, large (Patient not taking: Reported on 12/17/2019), Disp: 1 Device, Rfl: 0     predniSONE (DELTASONE) 20 MG tablet, TAKE TWO TABLETS BY MOUTH EVERY DAY (Patient not taking: Reported on 12/17/2019), Disp: 10 tablet, Rfl: 1     predniSONE (DELTASONE) 5 MG tablet, Pred 40mg daily for 2 weeks, then pred 30/20/15/10/5mg daily for 1 week each, then pred 5mg every other day for 1 week until off.. (Patient not taking: Reported on 6/25/2019), Disp: 228 tablet, Rfl: 0    FAMILY HISTORY:   Family History   Problem Relation Age of Onset     Gastrointestinal Disease Father         crohns/ulceratice colitis     Asthma Father      Diabetes Paternal Grandmother           ROS:    No fevers or chills  No weight loss  No blurry vision, double vision or change in vision  No sore throat  No lymphadenopathy  No headache, paraesthesias, or weakness in a limb  No shortness of breath or wheezing  No chest pain or  pressure  No arthralgias or myalgias  No rashes or skin changes  No odynophagia or dysphagia  No BRBPR, hematochezia, melena  No dysuria, frequency or urgency  No hot/cold intolerance or polyria  No anxiety or depression  PHYSICAL EXAMINATION:  Constitutional: aaox3, cooperative, pleasant, not dyspneic/diaphoretic, no acute distress  Vitals reviewed: /73 (BP Location: Left arm, Patient Position: Sitting, Cuff Size: Adult Large)   Pulse 76   Temp 97.8  F (36.6  C) (Oral)   Resp 16   Wt 92 kg (202 lb 14.4 oz)   SpO2 98%   BMI 25.36 kg/m    Wt:   Wt Readings from Last 2 Encounters:   12/17/19 92 kg (202 lb 14.4 oz)   06/25/19 88 kg (193 lb 14.4 oz)      Eyes: Sclera anicteric/injected  Ears/nose/mouth/throat: Normal oropharynx without ulcers or exudate, mucus membranes moist, hearing intact  Neck: supple, thyroid normal size  CV: No edema  Respiratory: Unlabored breathing  Lymph: No submandibular, supraclavicular or inguinal lymphadenopathy  Abd: Nondistended, no masses, +bs, no hepatosplenomegaly, nontender, no peritoneal signs  Skin: warm, perfused, no jaundice  Psych: Normal affect  MSK: Normal gait      PERTINENT STUDIES:   Most recent CBC:  Recent Labs   Lab Test 06/25/19  1415 03/19/19  1015   WBC 9.7 8.8   HGB 14.4 16.7   HCT 44.5 50.4    276     Most recent hepatic panel:  Recent Labs   Lab Test 06/25/19  1415 03/19/19  1015   ALT 52 98*   AST 42 36     Most recent creatinine:  Recent Labs   Lab Test 06/25/19  1415 03/19/19  1015   CR 0.95 1.04       ASSESSMENT/PLAN:    Onel White is a 29 year old male who presents for follow up of ulcerative pancolitis.  He seems to be in clinical remission at this point with no abdominal pain and formed stools daily.  I think that his intermittent hematochezia is most likely hemorrhoidal in nature and less likely related to active disease.  We do note that he most recently did have moderate to severe disease on colonoscopy April 2019 and we had  previously discussed initiation of biologic therapy with vedolizumab.  Given that he is feeling quite well recently, I do think that optimizing 5-ASA based therapy is going to be the best option prior to considering moving to Biologics.  We did discuss the importance of regular medication therapy adherence and controlling his disease in order to give him the best long-term outcome.  I recommend that he take his Lialda 4.8 g/day regularly.  After he is done this for approximately 4 months then I would like to follow-up with him to check on his symptoms at that point.  We will then plan to get updated labs, possibly schedule flexible sigmoidoscopy or colonoscopy at that point for reassessment of disease activity.  If he still has active disease despite optimization of the Lialda, then we may consider moving to vedolizumab at that point.    Disease/Date of Dx:  UC 2009  Extent/Location:   pancolitis  Activity:  Clinical remission.  Moderate to severe activity on colonoscopy 4/2019  Last Colonoscopy: 4/2019.  Ortega 1 in rectum and sigmoid, Ortega 2 throughout the rest to the cecum  Last MRE/CTE: none  Extraintestinal Manifestations:  none  Past Surgery:   none     Past Medications:  Sulfasalazine, steroids  Current Medications:  Mesalamine (Lialda) 4.8g/day     History of tobacco:  no  History of c-diff:  no     Pre-Biologic/Immunomodulator:   Last TB quant (none), last Hep B status (none checked),  Last Vit D (12 in 2019 ), Last DEXA (none)     Drug Levels:  Biologics: none checked  Prior TPMPT (none checked), prior thiopurine level (none checked)        PREVENTIVE CARE IN INFLAMMATORY BOWEL DISEASE     - Strongly recommend tobacco abstinence.     - Recommend considering daily calcium + Vitamin D supplementation.     - Recommend age-appropriate cancer surveillance:  - Yearly Dermatology visit for visual skin inspection if immunosuppressed, monthly skin self-check after bathing.  - (For men and women ages 9-26)  Consideration for HPV vaccination, should be discussed with your PCP further if you feel you'd like to pursue this.  - (For women, men with risk factors) Annual Pap smears if immunosuppressed, mammogram when deemed appropriate by your PCP.     - Recommend follow-up with your PCP to ensure the following vaccinations have been updated: Hepatitis A/B, Tdap, Pneumococcal pneumonia, yearly flu SHOT, Meningococcal meningitis (if college-age), HPV (all aged 18-26), etc.  - Would also recommend VZV and MMR titers be checked to confirm immunity.  - Live/attenuated vaccines (such as the INTRANASAL flu vaccine, MMR, Yellow Fever, or Zostavax vaccine) should not be administered to immunosuppressed patients, except in very specific instances which you should discuss with a GI and Infectious Disease provider first.     - Family planning:  If you or your partner are planning to become pregnant, please schedule time with us to discuss issues surrounding IBD and Fertility/Pregnancy.     -Avoid NSAIDS    RTC 4 months.    Thank you for this consultation.  It was a pleasure to participate in the care of this patient; please contact us with any further questions.     This note was created with voice recognition software, and while reviewed for accuracy, typos may remain.     Liang Hancock MD  Adjunct  of Medicine  Division of Gastroenterology, Hepatology and Nutrition  [facility]  [phone]

## 2020-03-09 ENCOUNTER — OFFICE VISIT (OUTPATIENT)
Dept: URGENT CARE | Facility: RETAIL CLINIC | Age: 30
End: 2020-03-09
Payer: COMMERCIAL

## 2020-03-09 VITALS
HEART RATE: 66 BPM | TEMPERATURE: 98.3 F | SYSTOLIC BLOOD PRESSURE: 146 MMHG | OXYGEN SATURATION: 97 % | DIASTOLIC BLOOD PRESSURE: 94 MMHG

## 2020-03-09 DIAGNOSIS — J20.9 ACUTE BRONCHITIS WITH SYMPTOMS > 10 DAYS: Primary | ICD-10-CM

## 2020-03-09 DIAGNOSIS — J45.41 MODERATE PERSISTENT ASTHMA WITH ACUTE EXACERBATION: ICD-10-CM

## 2020-03-09 PROCEDURE — 99213 OFFICE O/P EST LOW 20 MIN: CPT | Performed by: INTERNAL MEDICINE

## 2020-03-09 RX ORDER — ALBUTEROL SULFATE 90 UG/1
2 AEROSOL, METERED RESPIRATORY (INHALATION) 4 TIMES DAILY
Qty: 1 INHALER | Refills: 0 | Status: SHIPPED | OUTPATIENT
Start: 2020-03-09 | End: 2020-07-07

## 2020-03-09 RX ORDER — PREDNISONE 10 MG/1
TABLET ORAL
Qty: 30 TABLET | Refills: 0 | Status: SHIPPED | OUTPATIENT
Start: 2020-03-09 | End: 2020-03-17

## 2020-03-09 RX ORDER — AZITHROMYCIN 250 MG/1
TABLET, FILM COATED ORAL
Qty: 6 TABLET | Refills: 0 | Status: SHIPPED | OUTPATIENT
Start: 2020-03-09 | End: 2020-05-07

## 2020-03-09 NOTE — PROGRESS NOTES
[facility] Care Progress Note        Antwan Michael MD, MPH  03/09/2020        History:      Onel White is a pleasant 29 year old year old male with a chief complaint of cough,wheezing and nasal congestion since 12 days ago.   No fever or chills.   No dyspnea or chest pain.   No smoking.   No headache or neck pain.  No GI or  symptoms.   No MSK symptoms.  Hx of asthma.          Assessment and Plan:         Acute bronchitis with symptoms > 10 days    - azithromycin (ZITHROMAX) 250 MG tablet; Two tablets first day, then one tablet daily for four days.  Dispense: 6 tablet; Refill: 0  - albuterol (PROAIR HFA/PROVENTIL HFA/VENTOLIN HFA) 108 (90 Base) MCG/ACT inhaler; Inhale 2 puffs into the lungs 4 times daily for 10 days  Dispense: 1 Inhaler; Refill: 0  - predniSONE (DELTASONE) 10 MG tablet; 4 tabs daily for 3 days, then 3 tabs daily for 3 days, then 2 tabs daily for 3 days, then 1 tab daily for 3 days, then stop  Dispense: 30 tablet; Refill: 0  Discussed potential adverse effects of steroid w the patient.    Discussed the contagious nature of influenza w patient/family and to:  Be cautious in contact w others.  Wash hands w soap and water frequently.  Discussed supportive care with the patient/family  Advised to increase fluid intake and rest.  F/u w PCP in 4-5 days, earlier if symptoms worsen. Advised patient to call 911 or go to ER if he experiences dyspnea or chest pain of if her has other concerns.                   Physical Exam:      BP (!) 146/94   Pulse 66   Temp 98.3  F (36.8  C) (Oral)   SpO2 97%      Constitutional: Patient is in no distress The patient is pleasant and cooperative.   HEENT: Head:  Head is atraumatic, normocephalic.    Eyes: Pupils are equal, round and reactive to light and accomodation.  Sclera is non-icteric. No conjunctival injection, or exudate noted. Extraocular motion is intact. Visual acuity is intact bilaterally.  Ears:  External acoustic canals are patent  and clear.  There is no erythema or bulging of the tympanic membranes.  No swelling, erythema or tenderness upon palpation of the mastoid processes are noted.  Nose:  Nasal congestion w/o drainage or mucosal ulceration is noted.  Throat:  Oral mucosa is moist.  No oral lesions are noted. Posterior pharynx is clear.     Neck Supple.  There is no cervical / Postauricular lymphadenopathy.  No nuchal rigidity noted.  There is no meningismus.     Cardiovascular: Heart is regular to rate and rhythm.  No murmur is noted.  Recheck Blood pressure= 130/86.   Lungs: Clear in the anterior and posterior pulmonary fields. No respiratory distress currently. O2 sat= 98% on room air.   Abdomen: Soft and non-tender.    Back No flank tenderness is noted.   Extremeties No edema, no calf tenderness.   Neuro: No focal deficit.   Skin No petechiae or purpura is noted.  There is no rash.   Mood Normal              Data:      All new lab and imaging data was reviewed.   No results found for any visits on 03/09/20.

## 2020-03-11 NOTE — TELEPHONE ENCOUNTER
"Requested Prescriptions   Pending Prescriptions Disp Refills     BREO ELLIPTA 100-25 MCG/INH inhaler [Pharmacy Med Name: BREO ELLIPTA 100-25MCG/INH AEPB]  0     Sig: INHALE ONE PUFF BY MOUTH ONCE DAILY   Last Written Prescription Date:  10/4/19  Last Fill Quantity: 1,  # refills: 0   Last office visit: 9/27/2018 with preJohscribing provider:  Guzman   Future Office Visit:   Next 5 appointments (look out 90 days)    Apr 21, 2020  2:30 PM CDT  Return Visit with Liang Hancock MD  [facility] ([facility]) [id number] [place] [id number] [phone]             Inhaled Steroids Protocol Failed - 3/9/2020  4:28 PM        Failed - Asthma control assessment score within normal limits in last 6 months     Please review ACT score.   ACT Total Scores 12/19/2017 3/16/2018 9/27/2018   ACT TOTAL SCORE - - -   ASTHMA ER VISITS - - -   ASTHMA HOSPITALIZATIONS - - -   ACT TOTAL SCORE (Goal Greater than or Equal to 20) 22 12 19   In the past 12 months, how many times did you visit the emergency room for your asthma without being admitted to the hospital? 2 0 1   In the past 12 months, how many times were you hospitalized overnight because of your asthma? 1 0 0               Failed - Recent (6 mo) or future (30 days) visit within the authorizing provider's specialty     Patient had office visit in the last 6 months or has a visit in the next 30 days with authorizing provider or within the authorizing provider's specialty.  See \"Patient Info\" tab in inbasket, or \"Choose Columns\" in Meds & Orders section of the refill encounter.            Passed - Patient is age 12 or older        Passed - Medication is active on med list             "

## 2020-03-11 NOTE — TELEPHONE ENCOUNTER
Routing refill request to provider for review/approval because:  Labs out of range:  act  Labs not current:  act    Lilli Duffy RN on 3/11/2020 at 9:17 AM

## 2020-03-16 DIAGNOSIS — J20.9 ACUTE BRONCHITIS WITH SYMPTOMS > 10 DAYS: Primary | ICD-10-CM

## 2020-03-16 DIAGNOSIS — J20.9 ACUTE BRONCHITIS WITH SYMPTOMS GREATER THAN 10 DAYS: ICD-10-CM

## 2020-03-16 NOTE — TELEPHONE ENCOUNTER
Reason for call:  Medication   If this is a refill request, has the caller requested the refill from the pharmacy already? No  Will the patient be using a [facility]? No  Name of the pharmacy and phone number for the current request[facility]    Name of the medication requested: albuterol, breo ellipta    Other request:     Phone number to reach patient:  Cell number on file:    [phone]    Best Time:  anytime    Can we leave a detailed message on this number?  YES    Travel screening: Not Applicable

## 2020-03-17 RX ORDER — PREDNISONE 10 MG/1
TABLET ORAL
Qty: 30 TABLET | Refills: 0 | Status: SHIPPED | OUTPATIENT
Start: 2020-03-17 | End: 2020-07-28

## 2020-03-17 RX ORDER — ALBUTEROL SULFATE 0.83 MG/ML
2.5 SOLUTION RESPIRATORY (INHALATION) 4 TIMES DAILY
Qty: 56 VIAL | Refills: 0 | Status: SHIPPED | OUTPATIENT
Start: 2020-03-17 | End: 2020-07-07

## 2020-04-21 ENCOUNTER — VIRTUAL VISIT (OUTPATIENT)
Dept: GASTROENTEROLOGY | Facility: CLINIC | Age: 30
End: 2020-04-21
Payer: COMMERCIAL

## 2020-04-21 DIAGNOSIS — K51.011 ULCERATIVE PANCOLITIS WITH RECTAL BLEEDING (H): Primary | ICD-10-CM

## 2020-04-21 PROCEDURE — 99213 OFFICE O/P EST LOW 20 MIN: CPT | Mod: TEL | Performed by: INTERNAL MEDICINE

## 2020-04-21 NOTE — PROGRESS NOTES
GASTROENTEROLOGY Telephone Follow up visit    CC/REFERRING MD:    No Ref-Primary, Physician    HISTORY OF PRESENT ILLNESS:    Onel White is a 29 year old male who is being evaluated via a billable telephone visit.      We followed up today over the phone regarding ulcerative pancolitis.  He reports that he is continuing to take his Lialda 4.8 g/day and he is feeling quite well.  He has no abdominal pain.  He is having formed bowel movements daily.  He is not seen blood in his stool.  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  There are no rashes.  He does report a longstanding history of anxiety and depression.  He is not suicidal.  He would like some information regarding pharmacotherapy to treat the anxiety and depression.      I have reviewed and updated the patient's Past Medical History, Social History, Family History and Medication List.    ALLERGIES  Wheat    PERTINENT STUDIES have been reviewed.    ASSESSMENT/PLAN:    Onel White is a 29 year old male who presents for follow up of ulcerative pancolitis.  He is now in clinical remission on Lialda.  Recommend that we continue the plan.  Another flexible sigmoidoscopy or colonoscopy is indicated to ensure that he has an endoscopic and histologic remission however, there is increased risk of coronavirus transmission at this point so a semi-elective type of procedure like this should be delayed.  We can schedule this at his next follow-up visit.  Given his report of anxiety and depression, I would like him to establish care with primary care to see if an SSRI type medication could benefit him.  I would also like him to meet with psychology regarding this issue.  I think his joint pains may be from the manual labor.  If they persist or worsen, we could have him evaluated by rheumatology to get their opinion on if this could be IBD associated  arthritis.    Phone call duration: 20 minutes    RTC 6 months    Thank you for this consultation.  It was a pleasure to participate in the care of this patient; please contact us with any further questions.      This note was created with voice recognition software, and while reviewed for accuracy, typos may remain.     Liang Hancock MD  Division of Gastroenterology, Hepatology and Nutrition  [facility]  [phone]

## 2020-04-21 NOTE — PROGRESS NOTES
"Onel White is a 29 year old male who is being evaluated via a billable telephone visit.      The patient has been notified of following:     \"This telephone visit will be conducted via a call between you and your physician/provider. We have found that certain health care needs can be provided without the need for a physical exam.  This service lets us provide the care you need with a short phone conversation.  If a prescription is necessary we can send it directly to your pharmacy.  If lab work is needed we can place an order for that and you can then stop by our lab to have the test done at a later time.    Telephone visits are billed at different rates depending on your insurance coverage. During this emergency period, for some insurers they may be billed the same as an in-person visit.  Please reach out to your insurance provider with any questions.    If during the course of the call the physician/provider feels a telephone visit is not appropriate, you will not be charged for this service.\"    Patient has given verbal consent for Telephone visit?  Yes    How would you like to obtain your AVS? Bintahart    Phone call duration:    Jayden Bradford MA        "

## 2020-04-27 ENCOUNTER — TELEPHONE (OUTPATIENT)
Dept: GASTROENTEROLOGY | Facility: CLINIC | Age: 30
End: 2020-04-27

## 2020-04-27 NOTE — TELEPHONE ENCOUNTER
Call patient to assist with scheduling.    Yesi Floyd RN  Gastroenterology Care Coordinator  [place], [place]

## 2020-05-07 ENCOUNTER — VIRTUAL VISIT (OUTPATIENT)
Dept: PEDIATRICS | Facility: CLINIC | Age: 30
End: 2020-05-07
Payer: COMMERCIAL

## 2020-05-07 ENCOUNTER — VIRTUAL VISIT (OUTPATIENT)
Dept: PSYCHOLOGY | Facility: CLINIC | Age: 30
End: 2020-05-07
Payer: COMMERCIAL

## 2020-05-07 DIAGNOSIS — F43.23 ADJUSTMENT DISORDER WITH MIXED ANXIETY AND DEPRESSED MOOD: Primary | ICD-10-CM

## 2020-05-07 DIAGNOSIS — F41.1 GAD (GENERALIZED ANXIETY DISORDER): Primary | ICD-10-CM

## 2020-05-07 DIAGNOSIS — F12.20 CANNABIS USE DISORDER, SEVERE, DEPENDENCE (H): ICD-10-CM

## 2020-05-07 PROCEDURE — 99214 OFFICE O/P EST MOD 30 MIN: CPT | Mod: 95 | Performed by: NURSE PRACTITIONER

## 2020-05-07 PROCEDURE — 90791 PSYCH DIAGNOSTIC EVALUATION: CPT | Mod: 95 | Performed by: PSYCHOLOGIST

## 2020-05-07 RX ORDER — ESCITALOPRAM OXALATE 10 MG/1
10 TABLET ORAL DAILY
Qty: 30 TABLET | Refills: 1 | Status: SHIPPED | OUTPATIENT
Start: 2020-05-07 | End: 2020-08-10

## 2020-05-07 ASSESSMENT — COLUMBIA-SUICIDE SEVERITY RATING SCALE - C-SSRS
3. HAVE YOU BEEN THINKING ABOUT HOW YOU MIGHT KILL YOURSELF?: NO
LETHALITY/MEDICAL DAMAGE CODE FIRST ACTUAL ATTEMPT: MINOR PHYSICAL DAMAGE
4. HAVE YOU HAD THESE THOUGHTS AND HAD SOME INTENTION OF ACTING ON THEM?: NO
2. HAVE YOU ACTUALLY HAD ANY THOUGHTS OF KILLING YOURSELF?: NO
LETHALITY/MEDICAL DAMAGE CODE MOST LETHAL ACTUAL ATTEMPT: MINOR PHYSICAL DAMAGE
TOTAL  NUMBER OF INTERRUPTED ATTEMPTS PAST 3 MONTHS: NO
LETHALITY/MEDICAL DAMAGE CODE MOST RECENT ACTUAL ATTEMPT: MINOR PHYSICAL DAMAGE
1. IN THE PAST MONTH, HAVE YOU WISHED YOU WERE DEAD OR WISHED YOU COULD GO TO SLEEP AND NOT WAKE UP?: YES
1. IN THE PAST MONTH, HAVE YOU WISHED YOU WERE DEAD OR WISHED YOU COULD GO TO SLEEP AND NOT WAKE UP?: NO
6. HAVE YOU EVER DONE ANYTHING, STARTED TO DO ANYTHING, OR PREPARED TO DO ANYTHING TO END YOUR LIFE?: NO
2. HAVE YOU ACTUALLY HAD ANY THOUGHTS OF KILLING YOURSELF LIFETIME?: YES
TOTAL  NUMBER OF ABORTED OR SELF INTERRUPTED ATTEMPTS PAST LIFETIME: NO
MOST LETHAL DATE: 62578
TOTAL  NUMBER OF INTERRUPTED ATTEMPTS LIFETIME: NO
TOTAL  NUMBER OF ACTUAL ATTEMPTS LIFETIME: 1
TOTAL  NUMBER OF ABORTED OR SELF INTERRUPTED ATTEMPTS PAST 3 MONTHS: NO
ATTEMPT LIFETIME: YES
ATTEMPT PAST THREE MONTHS: NO
6. HAVE YOU EVER DONE ANYTHING, STARTED TO DO ANYTHING, OR PREPARED TO DO ANYTHING TO END YOUR LIFE?: YES
5. HAVE YOU STARTED TO WORK OUT OR WORKED OUT THE DETAILS OF HOW TO KILL YOURSELF? DO YOU INTEND TO CARRY OUT THIS PLAN?: NO
4. HAVE YOU HAD THESE THOUGHTS AND HAD SOME INTENTION OF ACTING ON THEM?: NO
5. HAVE YOU STARTED TO WORK OUT OR WORKED OUT THE DETAILS OF HOW TO KILL YOURSELF? DO YOU INTEND TO CARRY OUT THIS PLAN?: YES
REASONS FOR IDEATION LIFETIME: COMPLETELY TO END OR STOP THE PAIN (YOU COULDN'T GO ON LIVING WITH THE PAIN OR HOW YOU WERE FEELING)
FIRST ATTEMPT DATE: 62578

## 2020-05-07 ASSESSMENT — ANXIETY QUESTIONNAIRES
7. FEELING AFRAID AS IF SOMETHING AWFUL MIGHT HAPPEN: SEVERAL DAYS
6. BECOMING EASILY ANNOYED OR IRRITABLE: SEVERAL DAYS
3. WORRYING TOO MUCH ABOUT DIFFERENT THINGS: NEARLY EVERY DAY
GAD7 TOTAL SCORE: 17
5. BEING SO RESTLESS THAT IT IS HARD TO SIT STILL: NEARLY EVERY DAY
IF YOU CHECKED OFF ANY PROBLEMS ON THIS QUESTIONNAIRE, HOW DIFFICULT HAVE THESE PROBLEMS MADE IT FOR YOU TO DO YOUR WORK, TAKE CARE OF THINGS AT HOME, OR GET ALONG WITH OTHER PEOPLE: VERY DIFFICULT
2. NOT BEING ABLE TO STOP OR CONTROL WORRYING: NEARLY EVERY DAY
1. FEELING NERVOUS, ANXIOUS, OR ON EDGE: NEARLY EVERY DAY

## 2020-05-07 ASSESSMENT — PATIENT HEALTH QUESTIONNAIRE - PHQ9
5. POOR APPETITE OR OVEREATING: NEARLY EVERY DAY
SUM OF ALL RESPONSES TO PHQ QUESTIONS 1-9: 16

## 2020-05-07 NOTE — PROGRESS NOTES
"Onel White is a 29 year old male who is being evaluated via a billable telephone visit.      The patient has been notified of following:     \"This telephone visit will be conducted via a call between you and your physician/provider. We have found that certain health care needs can be provided without the need for a physical exam.  This service lets us provide the care you need with a short phone conversation.  If a prescription is necessary we can send it directly to your pharmacy.  If lab work is needed we can place an order for that and you can then stop by our lab to have the test done at a later time.    Telephone visits are billed at different rates depending on your insurance coverage. During this emergency period, for some insurers they may be billed the same as an in-person visit.  Please reach out to your insurance provider with any questions.    If during the course of the call the physician/provider feels a telephone visit is not appropriate, you will not be charged for this service.\"    Patient has given verbal consent for Telephone visit?  Yes    What phone number would you like to be contacted at? [phone]    How would you like to obtain your AVS? Mikael Lynch     Onel White is a 29 year old male who presents to clinic today for the following health issues:    HPI  Abnormal Mood Symptoms  Onset: noticed these symptoms 10-12 years. Worst in the last year    Description:   Depression: YES  Anxiety: YES    Accompanying Signs & Symptoms:  Still participating in activities that you used to enjoy: no  Fatigue: YES  Irritability: YES  Difficulty concentrating: YES  Changes in appetite: YES  Problems with sleep: YES  Heart racing/beating fast : YES  Thoughts of hurting yourself or others: none    History:   Recent stress: YES  Prior depression hospitalization: None  Family history of depression: YES- paternal grandmother  Family history of anxiety: no    Precipitating factors:   Alcohol/drug " use: no    Alleviating factors:  none    Therapies Tried and outcome: None     PHQ-9 SCORE 2020   PHQ-9 Total Score 16       JASON-7 SCORE 2020   Total Score 17       [facility] [place] Follow-up to PHQ 2020   PHQ-9 9. Suicide Ideation past 2 weeks Not at all     Patient has hx of ulcerative colitis  Never got it checked out and never bothered. 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 so that part of life is OK  Is single and lives with a friend   Does use alcohol but not to excess  Does smoke pot fairly heavy day and is in treatment for being in car in the passenger seat  Paternal grandmother with depression   No thoughts presently for thoughts of hurting self or others   Does think highly of self  Always really kept to himself and this last few years   Has a new girlfriend and she has been on anxiety medications and said he would benefit   Not really any panic attacks   Has asthma and mild COPD as well he has been told     Patient Active Problem List   Diagnosis     Other acne     UC (Ulcerative Colitis), involving the entire gabi     PPD screening test-2009 Negative     Moderate persistent asthma with acute exacerbation     Past Surgical History:   Procedure Laterality Date     C APPENDECTOMY,RUPT APPENDX+ABSCESS      peritonitis     COLONOSCOPY       COLONOSCOPY N/A 2019    Procedure: Combined Colonoscopy, Single Or Multiple Biopsy/Polypectomy By Biopsy;  Surgeon: Liang Hancock MD;  Location: [facility] OR     COLONOSCOPY WITH CO2 INSUFFLATION N/A 2019    Procedure: COLONOSCOPY WITH CO2 INSUFFLATION;  Surgeon: Liang Hancock MD;  Location: [facility] OR       Social History     Tobacco Use     Smoking status: Former Smoker     Types: Cigarettes     Last attempt to quit: 2012     Years since quittin.9     Smokeless tobacco: Never Used   Substance Use Topics     Alcohol use: Yes     Alcohol/week: 20.0 standard  drinks     Types: 20 Standard drinks or equivalent per week     Family History   Problem Relation Age of Onset     Gastrointestinal Disease Father         crohns/ulceratice colitis     Asthma Father      Diabetes Paternal Grandmother      Depression Paternal Grandmother          Current Outpatient Medications   Medication Sig Dispense Refill     albuterol (2.5 MG/3ML) 0.083% neb solution NEBULIZE THE CONTENTS OF 1 VIAL BY MOUTH EVERY 6 HOURS AS NEEDED FOR SHORTNESS OF BREATH / DYSPNEA OR WHEEZING 180 mL 11     albuterol (PROAIR HFA/PROVENTIL HFA/VENTOLIN HFA) 108 (90 Base) MCG/ACT inhaler Inhale 2 puffs into the lungs every 6 hours as needed for shortness of breath / dyspnea or wheezing 3 Inhaler 1     BREO ELLIPTA 100-25 MCG/INH inhaler INHALE ONE PUFF BY MOUTH ONCE DAILY 60 Inhaler 3     predniSONE (DELTASONE) 10 MG tablet 4 tabs daily for 3 days, then 3 tabs daily for 3 days, then 2 tabs daily for 3 days, then 1 tab daily for 3 days, then stop 30 tablet 0     albuterol (PROAIR HFA/PROVENTIL HFA/VENTOLIN HFA) 108 (90 Base) MCG/ACT inhaler Inhale 2 puffs into the lungs 4 times daily for 10 days 1 Inhaler 0     albuterol (PROAIR HFA/PROVENTIL HFA/VENTOLIN HFA) 108 (90 Base) MCG/ACT inhaler Inhale 2 puffs into the lungs 4 times daily 8.5 g 0     albuterol (PROVENTIL) (2.5 MG/3ML) 0.083% neb solution Take 1 vial (2.5 mg) by nebulization 4 times daily 56 vial 0     azithromycin (ZITHROMAX) 250 MG tablet Two tablets first day, then one tablet daily for four days. (Patient not taking: Reported on 4/21/2020) 6 tablet 0     order for DME Equipment being ordered: patellar stabilization brace with horseshoe, large (Patient not taking: Reported on 12/17/2019) 1 Device 0     predniSONE (DELTASONE) 5 MG tablet Pred 40mg daily for 2 weeks, then pred 30/20/15/10/5mg daily for 1 week each, then pred 5mg every other day for 1 week until off.. (Patient not taking: Reported on 6/25/2019) 228 tablet 0     Allergies   Allergen  Reactions     Wheat      BP Readings from Last 3 Encounters:   03/09/20 (!) 146/94   12/17/19 113/73   06/25/19 123/75    Wt Readings from Last 3 Encounters:   12/17/19 92 kg (202 lb 14.4 oz)   06/25/19 88 kg (193 lb 14.4 oz)   03/19/19 87.1 kg (192 lb 1.6 oz)           Reviewed and updated as needed this visit by Provider         Review of Systems   ROS COMP: CONSTITUTIONAL:NEGATIVE for fever, chills, change in weight  ENT/MOUTH: NEGATIVE for ear, mouth and throat problems  RESP:POSITIVE for Hx asthma and NEGATIVE for cough-productive, hemoptysis and SOB/dyspnea  CV: NEGATIVE for chest pain, palpitations or peripheral edema  GI: NEGATIVE for nausea, abdominal pain, heartburn, or change in bowel habits  MUSCULOSKELETAL: NEGATIVE for significant arthralgias or myalgia  NEURO: NEGATIVE for weakness, dizziness or paresthesias  ENDOCRINE: NEGATIVE for temperature intolerance, skin/hair changes  PSYCHIATRIC: POSITIVE foranxiety and depressed mood and NEGATIVE foralcohol abuse, thoughts of hurting someone else and thoughts of self harm       Objective   Reported vitals:     Wt Readings from Last 4 Encounters:   12/17/19 92 kg (202 lb 14.4 oz)   06/25/19 88 kg (193 lb 14.4 oz)   03/19/19 87.1 kg (192 lb 1.6 oz)   09/27/18 92.4 kg (203 lb 12.8 oz)       alert and no distress  PSYCH: Alert and oriented times 3; coherent speech, normal   rate and volume, able to articulate logical thoughts, able   to abstract reason, no tangential thoughts, no hallucinations   or delusions  His affect is Alert, oriented, thought content appropriate, thought content exhibits logical connections, when questioned about suicide, the patient expresses no suicidal ideation, no homicidal ideation,mentation appears normal., oriented X 3  MENTAL STATUS EXAM:  Appearance/Behavior: No apparent distress  Speech: Normal  Mood/Affect: normal affect  Insight: Adequate      RESP: No cough, no audible wheezing, able to talk in full sentences  Remainder of  exam unable to be completed due to telephone visits    Diagnostic Test Results:  Labs reviewed in Epic        Assessment/Plan:  Onel was seen today for depression and anxiety.    Diagnoses and all orders for this visit:    Adjustment disorder with mixed anxiety and depressed mood  -     escitalopram (LEXAPRO) 10 MG tablet; Take 1 tablet (10 mg) by mouth daily  Initiate medication with Lexapro 10 mg q day  Reviewed concept of depression as function of biochemical imbalance of neurotransmitters/rationale for treatment.  Risks and benefits of medication(s) reviewed with patient.  Questions answered.  Counseling advised  Followup appointment in 1 month(s)  Patient instructed to call for significant side effects medications or problems  Patient advised immediate presentation to hospital for suicidal thought, etc.    Patient Instructions     PLAN:   1.   Symptomatic therapy suggested: start on Lexapro once a day  2.  Orders Placed This Encounter   Medications     escitalopram (LEXAPRO) 10 MG tablet     Sig: Take 1 tablet (10 mg) by mouth daily     Dispense:  30 tablet     Refill:  1       3. Patient needs to follow up in if no improvement,or sooner if worsening of symptoms or other symptoms develop.  Initiated consultation with  Jarret Adorno  Psych D, LP for mental health counseling.   Follow up in 1 month.  Continue treatment plan for DUI     Phone call duration:  23 minutes    CECIL Eckert CNP

## 2020-05-07 NOTE — PROGRESS NOTES
"[facility]  Integrated Behavioral Health Services   Diagnostic Assessment    Onel White is a 29 year old male who is being evaluated via a telephone visit.      The patient has been notified of the following:     \"We have found that certain health care needs can be provided without the need for a face to face visit.  This service lets us provide the care you need with a short phone conversation.      I will have full access to your [facility] medical record during this entire phone call.   I will be taking notes for your medical record.     Since this is like an office visit, we will bill your insurance company for this service.  Please check with your medical insurance if this type of telephone visit/virtual care is covered.  You may be responsible for the cost of this service if insurance coverage is denied.      There are potential benefits and risks of telephone visits (e.g. limits to patient confidentiality) that differ from in-person visits.?  Confidentiality still applies for telephone services, and nobody will record the visit.  It is important to be in a quiet, private space that is free of distractions (including cell phone or other devices) during the visit.??     If during the course of the call I believe a telephone visit is not appropriate, you will not be charged for this service\"    Consent has been obtained for this service by care team member: yes.    Start time: 03:10pm    End time: 04:00pm    PATIENT'S NAME: Onel White  MRN:   [id number]  :   1990  DATE OF SERVICE: May 7, 2020  SERVICE LOCATION: Phone call (patient / identified key support person reached)  Visit Activities: NEW and [facility] Only      Identifying Information:  Patient is a 29 year old year old, , partnered / significant other male.  Patient attended the session alone.        Referral:  Patient was referred for an assessment by Dr. Hancock at [facility] - " "Gastroenterology.   Reason for referral: determine behavioral health treatment options.       Patient's Statement of Presenting Concern:  Patient reports the following reason(s) for seeking an assessment at this time: anxiety which has been problematic for most of his life.  Patient stated that his symptoms have resulted in the following functional impairments: chronic disease management      History of Presenting Concern:  Patient reports that these problem(s); anxiety has been a problem most of his life; colitis was diagnosed over 10 years ago. Patient has not attempted to resolve these concerns in the past. Patient reports that other professional(s) are not involved in providing support / services.       Family/Social History:  The patient was born and raised in [place], [place] by both parents. He has no siblings. He described his childhood as, \"It was good. My parents were good parents.\" He denied experiencing childhood abuse. He denied exhibiting problematic behaviors in adolescence. His parents are living. He stated that he has a good relationship with his family. There are no ethnic, cultural or Presybeterian factors that may be relevant for therapy.    The patient has not been  and has no children. He is currently partnered / significant other and has been for a few months. He described his with her as, \"good. She's really a positive influence on me.\" He lives with a friend in a home and indicated a stable living situation. He reported having some good friends. He does not belong to any social groups. He noted that he spends his free time fishing. \"It's the only thing I really love anymore.\"      Educational/Occupational History:  The patient graduated high school in 2010. He described himself as an average student. He did not identify any learning problems. He denied exhibiting significant behavioral problems in school. After high school, the patient did not serve in the . He reported his " "highest education level was high school graduate and some college. Patient is currently employed full time and reports they are able to function appropriately at work. 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      Mental Health History:  Patient reported the following biological family members or relatives with mental health issues: Paternal Grandmother experienced Depression, Uncle experienced Depression. Patient has not received mental health services in the past. Patient is currently receiving the following services: primary care behavioral health provider.      Chemical Health History:  He drinks 2-3x per week averaging a few drinks at a time. Other recent reports suggest he drinks upwards of 20 drinks per week. He received a DUI in 2012 and again in January 2020. He is taking classes. The patient smokes marijuana saying \"I'm a pretty heavy pot smoker.\" He smokes throughout the day; every few hours and suspects he smokes 2 grams a day. This has been his pattern for the last 7.5 years.    Patient has received chemical dependency treatment in the past at 3 days a week for 3-4 months in 2012. Patient is currently receiving the following services: Harm Reduction: [facility] in [place]. Patient reported the following problems as a result of drinking and drug use: legal issues.    CAGE:  Have you ever: C    Patient felt they ought to CUT down on your drinking (or drug use)., A     Patient felt ANNOYED by people criticizing their drinking (or drug use). and G     Patient felt bad or GUILTY about their drinking (or drug use).    Cage-AID score is: 3 Based on Cage-Aid score and clinical interview there  are indications of drug or alcohol abuse. Substance abuse is currently treated as part of his DUI.      Discussed the general effects of drugs and alcohol on health and well-being.    He reported drinking energy drinks when he works.     Family history:  Patient reported the following biological " "family members or relatives with chemical health issues: Uncle reportedly used alcohol .       Legal History:  The patient reported a DUI in 2012 and is currently charged with his second DUI.       Significant Losses / Trauma / Abuse / Neglect Issues:  There are no indications or report of: significant losses, trauma, abuse or neglect.    Issues of possible neglect are not present.      Medical History:   Patient Active Problem List   Diagnosis     Other acne     UC (Ulcerative Colitis), involving the entire gabi     PPD screening test-07/28/2009 Negative     Moderate persistent asthma with acute exacerbation       Medication Review:  Current Outpatient Medications   Medication     albuterol (2.5 MG/3ML) 0.083% neb solution     albuterol (PROAIR HFA/PROVENTIL HFA/VENTOLIN HFA) 108 (90 Base) MCG/ACT inhaler     albuterol (PROAIR HFA/PROVENTIL HFA/VENTOLIN HFA) 108 (90 Base) MCG/ACT inhaler     albuterol (PROAIR HFA/PROVENTIL HFA/VENTOLIN HFA) 108 (90 Base) MCG/ACT inhaler     albuterol (PROVENTIL) (2.5 MG/3ML) 0.083% neb solution     BREO ELLIPTA 100-25 MCG/INH inhaler     escitalopram (LEXAPRO) 10 MG tablet     predniSONE (DELTASONE) 10 MG tablet     predniSONE (DELTASONE) 5 MG tablet     No current facility-administered medications for this visit.        Patient was provided recommendation to follow-up with physician.    Mental Status Assessment:  Appearance:   Unable to assess over the phone   Eye Contact:   Unable to assess over the phone  Psychomotor Behavior: Unable to assess over the phone  Attitude:   Cooperative   Orientation:   All  Speech   Rate / Production: Normal/ Responsive Normal    Volume:  Normal   Mood:    Anxious \"but it's situational\"   Affect:    Appropriate   Thought Content:  Clear   Thought Form:  Coherent  Logical   Insight:    Fair     Patient has had a history of suicidal ideation: most recent was one year ago; no plan/intent at that time and suicide attempts: in 2012 he attempted to hang " "himself following a breakup and a DUI   Patient denies current or recent suicidal ideation or behaviors.   Patient denies current or recent homicidal ideation or behaviors.   Patient denies current or recent self injurious behavior or ideation.   Patient denies other safety concerns.   Patient reports there are no firearms in the house   Protective Factors Sense of responsibility to family and Positive social support   Risk Factors Abrupt changes in clinical condition, History of attempted suicide and Implulsivity       Plan for Safety and Risk Management:  A safety and risk management plan has not been developed at this time, however patient was encouraged to call [place] [phone] should there be a change in any of these risk factors. No immediate risk was identified though a safety plan will be discussed with patient next session.      Review of Symptoms:  Depression: No symptoms   Lisa:  No symptoms  Psychosis: No symptoms  Anxiety: Worries Nervousness Usual irritability/agitation; \"once in that mood nothing will get me out for that day\"  Panic:  No symptoms  Post Traumatic Stress Disorder: No symptoms  Obsessive Compulsive Disorder: No symptoms  Eating Disorder: No symptoms  Oppositional Defiant Disorder: No symptoms  ADD / ADHD: No symptoms  Conduct Disorder: No symptoms  Sleep: \"I can't fall asleep when I want to. i'll eat a lot until I'm full then go fall asleep on couch watching TV. Sleep for 1-2 hours and up again.      Patient's Strengths and Limitations:  Patient identified the following strengths or resources that will help him succeed in counseling: friends / good social support and family support. Patient identified the following supports: family and friends. Things that may interfere with the patien'ts success in behavioral health services include:physical health concerns and substance abuse and legal issues.    Diagnostic Criteria:  A. Excessive anxiety and worry about a number of events or " activities (such as work or school performance).   B. The person finds it difficult to control the worry.  C. Select 3 or more symptoms (required for diagnosis). Only one item is required in children.   - Restlessness or feeling keyed up or on edge.    - Being easily fatigued.    - Difficulty concentrating or mind going blank.    - Irritability.    - Sleep disturbance (difficulty falling or staying asleep, or restless unsatisfying sleep).   D. The focus of the anxiety and worry is not confined to features of an Axis I disorder.  E. The anxiety, worry, or physical symptoms cause clinically significant distress or impairment in social, occupational, or other important areas of functioning.   F. The disturbance is not due to the direct physiological effects of a substance (e.g., a drug of abuse, a medication) or a general medical condition (e.g., hyperthyroidism) and does not occur exclusively during a Mood Disorder, a Psychotic Disorder, or a Pervasive Developmental Disorder.  Cannabis Use Disorder - severe. Criteria met includes:     1 Taking the substance in larger amounts or for longer than you're meant to.  2 Wanting to cut down or stop using the substance but not managing to.  3 Spending a lot of time getting, using, or recovering from use of the substance.  4 Cravings and urges to use the substance.  5 Continuing to use, even when it causes problems in relationships.  6 Giving up important social, occupational, or recreational activities because of substance use.  7 Using substances again and again, even when it puts you in danger.  8 Continuing to use, even when you know you have a physical or psychological problem that could have been caused or made worse by the substance.  9 Needing more of the substance to get the effect you want (tolerance).  10  Development of withdrawal symptoms, which can be relieved by taking more of the substance.      Functional Status:  Patient's symptoms are causing reduced  functional status in the following areas: Activities of Daily Living - caring for hygiene  Follow through with Medical recommendations - reducing cannabis use  Social / Relational - withdrawing; avoiding some activities with friends if involved in public      DSM5 Diagnoses: (Sustained by DSM5 Criteria Listed Above)  Diagnoses: 300.02 (F41.1) Generalized Anxiety Disorder  Substance-Related & Addictive Disorders 304.30 (F12.20) Cannabis Use Disorder Severe     Psychosocial & Contextual Factors: chronic medical disorder  [place] Suicide Severity Rating Scale (Lifetime/Recent)  [place] Suicide Severity Rating (Lifetime/Recent) 5/7/2020   1. Wish to be Dead (Lifetime) Yes   1. Wish to be Dead (Recent) No   2. Non-Specific Active Suicidal Thoughts (Lifetime) Yes   Non-Specific Active Suicidal Thought Description (Lifetime) 2012 after DUI and breakup with girlfriend   2. Non-Specific Active Suicidal Thoughts (Recent) No   3. Active Suicidal Ideation with any Methods (Not Plan) Without Intent to Act (Lifetime) No   3. Active Sucidal Ideation with any Methods (Not Plan) Without Intent to Act (Recent) No   4. Active Suicidal Ideation with Some Intent to Act, Without Specific Plan (Lifetime) No   4. Active Suicidal Ideation with Some Intent to Act, Without Specific Plan (Recent) No   5. Active Suicidal Ideation with Specific Plan and Intent (Lifetime) Yes   Active Suicidal Ideation with Specific Plan and Intent Description (Lifetime) 2012 after DUI and breakup with girlfriend; hung himself in his room but his method failed; he passed out and woke up some time later   5. Active Suicidal Ideation with Specific Plan and Intent (Recent) No   Most Severe Ideation Rating (Lifetime) 5   Frequency (Lifetime) 5   Duration (Lifetime) 5   Controllability (Lifetime) 5   Protective Factors  (Lifetime) 5   Reasons for Ideation (Lifetime) 5   Most Severe Ideation Rating (Past Month) NA   Frequency (Past Month) NA   Duration (Past Month)  NA   Controllability (Past Month) NA   Protective Factors (Past Month) NA   Reasons for Ideation (Past Month) NA   Actual Attempt (Lifetime) Yes   Actual Attempt Description (Lifetime) 2012 after DUI and breakup with girlfriend; hung himself in his room but his method failed; he passed out and woke up some time later   Total Number of Actual Attempts (Lifetime) 1   Actual Attempt (Past 3 Months) No   Has subject engaged in non-suicidal self-injurious behavior? (Lifetime) No   Has subject engaged in non-suicidal self-injurious behavior? (Past 3 Months) No   Interrupted Attempts (Lifetime) No   Interrupted Attempts (Past 3 Months) No   Aborted or Self-Interrupted Attempt (Lifetime) No   Aborted or Self-Interrupted Attempt (Past 3 Months) No   Preparatory Acts or Behavior (Lifetime) Yes   Preparatory Acts or Behavior (Past 3 Months) No   Most Recent Attempt Actual Lethality Code 1   Most Lethal Attempt Actual Lethality Code 1   Initial/First Attempt Date 53768   Initial/First Attempt Actual Lethality Code 1     Clinical Global Impressions  First:  Considering your total clinical experience with this particular patient population, how severe are the patient's symptoms at this time?: 6 (5/7/2020  8:54 PM)      Preliminary Treatment Plan:    The client reports no currently identified Sikh, ethnic or cultural issues relevant to therapy.    Initial Treatment will focus on: Anxiety - increase coping skills  Adjustment Difficulties related to: chronic medical conditions  Alcohol / Substance Use - cannabis use.    Chemical dependency recommendations: Patient is taking classes in connection to his DUI. Will support this effort and encourage reduction of cannabis use    As a preliminary treatment goal, patient will develop more effective coping skills to manage anxiety symptoms, will develop coping/problem-solving skills to facilitate more adaptive adjustment and will make healthier choices in regard to substance  use.    The focus of initial interventions will be to increase coping skills.    Collaboration with other professionals is not indicated at this time.    Referral to another professional/service is not indicated at this time..    A Release of Information is not needed at this time.    Report to child or adult protection services was NA.    Mendez Adorno PsyD, Behavioral Health Clinician

## 2020-05-07 NOTE — Clinical Note
Got the intake with Onel completed today. There is one thing I wanted to highlight because I didn't notice it in your notes but he reported he smoking 2 grams of marijuana daily. He is in treatment for alcohol because of a DUI (it's his second in 7 years). I suspect that he may be under-reporting his alcohol use though. Just wanted to mention it if it impacts his treatment.     Thanks for the referral,  Jarret

## 2020-05-08 ASSESSMENT — ANXIETY QUESTIONNAIRES: GAD7 TOTAL SCORE: 17

## 2020-05-08 NOTE — PATIENT INSTRUCTIONS
PLAN:   1.   Symptomatic therapy suggested: start on Lexapro once a day  2.  Orders Placed This Encounter   Medications     escitalopram (LEXAPRO) 10 MG tablet     Sig: Take 1 tablet (10 mg) by mouth daily     Dispense:  30 tablet     Refill:  1       3. Patient needs to follow up in if no improvement,or sooner if worsening of symptoms or other symptoms develop.  Initiated consultation with  Jarret Adorno  [facility] for mental health counseling.   Follow up in 1 month.  Continue treatment plan for DUI

## 2020-06-01 DIAGNOSIS — K51.011 ULCERATIVE PANCOLITIS WITH RECTAL BLEEDING (H): ICD-10-CM

## 2020-06-01 RX ORDER — MESALAMINE 1.2 G/1
4800 TABLET, DELAYED RELEASE ORAL
Qty: 360 TABLET | Refills: 1 | Status: SHIPPED | OUTPATIENT
Start: 2020-06-01 | End: 2020-09-28

## 2020-06-02 ENCOUNTER — TELEPHONE (OUTPATIENT)
Dept: GASTROENTEROLOGY | Facility: CLINIC | Age: 30
End: 2020-06-02

## 2020-06-02 NOTE — TELEPHONE ENCOUNTER
Pt states? My gut has been bad for a few weeks    Lexapro started a month ago  Lexapro has been helping with anxiety and depression sx but not sure if it's contributing to GI symptoms    Diarrhea? Yes  How often? 4 times a day  Blood? Yes  How much? Streak or in toilet bowl? Sometimes a couple drops of blood, sometimes a dark red in toilet bowl  Blood in stool prior to lexapro? Not for a while, but sometimes a couple drops  Do you have hemorrhoids now? Maybe 1  Pain? Yes, so bad that pt lays on the floor for at least 30 minutes  7/10 pain  Location? Pain jumps around, sometimes under right rib, then moved to just under left rib, then waist line on left side  Cramping? Yes  Gas? Yes  Nausea or Vomiting? No  Of note, pt leaves early Friday morning for [place] for 10 days    Yesi Floyd, RN  Gastroenterology Care Coordinator  [place], [place]

## 2020-06-02 NOTE — TELEPHONE ENCOUNTER
Patient states he is having some issues and concerns he would like to discuss as he thinks it might have something to do with the new medication he was place on. Patient requesting call back to discuss.

## 2020-06-09 DIAGNOSIS — K51.011 ULCERATIVE PANCOLITIS WITH RECTAL BLEEDING (H): Primary | ICD-10-CM

## 2020-06-09 NOTE — TELEPHONE ENCOUNTER
Spoke to pt who is now in [place]. Pt will have labs completed when he returns.    Instructed pt to go to the ER if pain becomes severe or if he notices a large amount of blood in the toilet.    Pt vocalizes understanding and does not have any questions at this time.    Yesi Floyd RN  Gastroenterology Care Coordinator  [place], [place]

## 2020-06-12 ENCOUNTER — TELEPHONE (OUTPATIENT)
Dept: GASTROENTEROLOGY | Facility: CLINIC | Age: 30
End: 2020-06-12

## 2020-06-12 NOTE — TELEPHONE ENCOUNTER
Spoke to pt, he will do stool studies on Sunday.  Pt states symptoms have become worse. Pt is noting blood in the stool every time he goes to the bathroom. Pain is under ribs on the left side. Pain is below navel as well.   Instructed pt to go to the ER with any dizziness or LH.    Pt vocalizes understanding and does not have any questions at this time.    Yesi Floyd RN  Gastroenterology Care Coordinator  [place], [place]

## 2020-06-12 NOTE — TELEPHONE ENCOUNTER
Patient states nothing has gotten any better. Patient requesting orders for CT and colonoscopy be placed as soon as possible. Patient also requesting call back to confirm orders placed. Please advise.

## 2020-06-14 ENCOUNTER — NURSE TRIAGE (OUTPATIENT)
Dept: NURSING | Facility: CLINIC | Age: 30
End: 2020-06-14

## 2020-06-14 NOTE — TELEPHONE ENCOUNTER
The patient is back in town.     Symptoms are mainly at night.    He is having bad cramping.    The pain is under the left right and along the waist line on the left side    Still having the rectal bleeding with 8 out of 10 BM's (so almost every BM)    The blood has been mixed in with the stools. And at times it has been pretty dark.    For the last 3 weeks BM's have been water.     Yesterday BM have been more mushy.    Slight nausea with the cramping    No dizziness    No light headiness    Feeling fatigued but was wondering if that was related to being in [place]    [phone]    COVID 19 Nurse Triage Plan/Patient Instructions    Please be aware that novel coronavirus (COVID-19) may be circulating in the community. If you develop symptoms such as fever, cough, or SOB or if you have concerns about the presence of another infection including coronavirus (COVID-19), please contact your health care provider or visit [email or web address].     Disposition/Instructions    Patient to go to ED and follow protocol based instructions.     Bring Your Own Device:  Please also bring your smart device(s) (smart phones, tablets, laptops) and their charging cables for your personal use and to communicate with your care team during your visit.    Thank you for taking steps to prevent the spread of this virus.  o Limit your contact with others.  o Wear a simple mask to cover your cough.  o Wash your hands well and often.    Resources    [facility]: About COVID-19: [email or web address]    CDC: What to Do If You're Sick: www.cdc.gov/coronavirus/2019-ncov/about/steps-when-sick.html    CDC: Ending Home Isolation: www.cdc.gov/coronavirus/2019-ncov/hcp/disposition-in-home-patients.html     CDC: Caring for Someone: www.cdc.gov/coronavirus/2019-ncov/if-you-are-sick/care-for-someone.html     MD: Interim Guidance for Hospital Discharge to Home: www.health.[facility].[place]/diseases/coronavirus/hcp/hospdischarge.pdf    [facility]  "[place] clinical trials (COVID-19 research studies): [email or web address]-clinical-trials     Below are the COVID-19 hotlines at the [place] Department of Health ([facility]). Interpreters are available.   o For health questions: Call 110-185-0002 or 1-851.160.1445 (7 a.m. to 7 p.m.)  o For questions about schools and childcare: Call 765-331-8632 or 1-998.649.5472 (7 a.m. to 7 p.m.)           Linnea Eldridge RN          Reason for Disposition    [1] MODERATE rectal bleeding (small blood clots, passing blood without stool, or toilet water turns red) AND [2] more than once a day    Additional Information    Negative: Shock suspected (e.g., cold/pale/clammy skin, too weak to stand, low BP, rapid pulse)    Negative: Difficult to awaken or acting confused (e.g., disoriented, slurred speech)    Negative: Passed out (i.e., lost consciousness, collapsed and was not responding)    Negative: [1] Vomiting AND [2] contains red blood or black (\"coffee ground\") material  (Exception: few red streaks in vomit that only happened once)    Negative: Sounds like a life-threatening emergency to the triager    Negative: SEVERE rectal bleeding (large blood clots; on and off, or constant bleeding)    Negative: SEVERE dizziness (e.g., unable to stand, requires support to walk, feels like passing out now)    Protocols used: RECTAL BLEEDING-A-AH      "

## 2020-06-15 ENCOUNTER — HOSPITAL ENCOUNTER (EMERGENCY)
Facility: CLINIC | Age: 30
Discharge: HOME | End: 2020-06-15
Attending: EMERGENCY MEDICINE | Admitting: EMERGENCY MEDICINE
Payer: COMMERCIAL

## 2020-06-15 ENCOUNTER — TELEPHONE (OUTPATIENT)
Dept: GASTROENTEROLOGY | Facility: CLINIC | Age: 30
End: 2020-06-15

## 2020-06-15 VITALS
HEART RATE: 67 BPM | HEIGHT: 75 IN | SYSTOLIC BLOOD PRESSURE: 164 MMHG | WEIGHT: 195 LBS | RESPIRATION RATE: 16 BRPM | TEMPERATURE: 98.3 F | OXYGEN SATURATION: 99 % | DIASTOLIC BLOOD PRESSURE: 100 MMHG | BODY MASS INDEX: 24.25 KG/M2

## 2020-06-15 DIAGNOSIS — K51.911 ULCERATIVE COLITIS WITH RECTAL BLEEDING, UNSPECIFIED LOCATION (H): ICD-10-CM

## 2020-06-15 DIAGNOSIS — K51.011 ULCERATIVE PANCOLITIS WITH RECTAL BLEEDING (H): Primary | ICD-10-CM

## 2020-06-15 LAB
ALBUMIN SERPL-MCNC: 2.9 G/DL (ref 3.4–5)
ALP SERPL-CCNC: 258 U/L (ref 40–150)
ALT SERPL W P-5'-P-CCNC: 64 U/L (ref 0–70)
ANION GAP SERPL CALCULATED.3IONS-SCNC: 4 MMOL/L (ref 3–14)
AST SERPL W P-5'-P-CCNC: 28 U/L (ref 0–45)
BASOPHILS # BLD AUTO: 0 10E9/L (ref 0–0.2)
BASOPHILS NFR BLD AUTO: 0.3 %
BILIRUB SERPL-MCNC: 0.2 MG/DL (ref 0.2–1.3)
BUN SERPL-MCNC: 13 MG/DL (ref 7–30)
CALCIUM SERPL-MCNC: 8.6 MG/DL (ref 8.5–10.1)
CHLORIDE SERPL-SCNC: 104 MMOL/L (ref 94–109)
CO2 SERPL-SCNC: 29 MMOL/L (ref 20–32)
CREAT SERPL-MCNC: 0.94 MG/DL (ref 0.66–1.25)
DIFFERENTIAL METHOD BLD: NORMAL
EOSINOPHIL # BLD AUTO: 0.6 10E9/L (ref 0–0.7)
EOSINOPHIL NFR BLD AUTO: 6.3 %
ERYTHROCYTE [DISTWIDTH] IN BLOOD BY AUTOMATED COUNT: 13.2 % (ref 10–15)
GFR SERPL CREATININE-BSD FRML MDRD: >90 ML/MIN/{1.73_M2}
GLUCOSE SERPL-MCNC: 87 MG/DL (ref 70–99)
HCT VFR BLD AUTO: 41 % (ref 40–53)
HGB BLD-MCNC: 13.3 G/DL (ref 13.3–17.7)
IMM GRANULOCYTES # BLD: 0 10E9/L (ref 0–0.4)
IMM GRANULOCYTES NFR BLD: 0.3 %
LIPASE SERPL-CCNC: 149 U/L (ref 73–393)
LYMPHOCYTES # BLD AUTO: 2 10E9/L (ref 0.8–5.3)
LYMPHOCYTES NFR BLD AUTO: 21.4 %
MCH RBC QN AUTO: 30 PG (ref 26.5–33)
MCHC RBC AUTO-ENTMCNC: 32.4 G/DL (ref 31.5–36.5)
MCV RBC AUTO: 92 FL (ref 78–100)
MONOCYTES # BLD AUTO: 0.9 10E9/L (ref 0–1.3)
MONOCYTES NFR BLD AUTO: 9.7 %
NEUTROPHILS # BLD AUTO: 5.9 10E9/L (ref 1.6–8.3)
NEUTROPHILS NFR BLD AUTO: 62 %
NRBC # BLD AUTO: 0 10*3/UL
NRBC BLD AUTO-RTO: 0 /100
PLATELET # BLD AUTO: 260 10E9/L (ref 150–450)
POTASSIUM SERPL-SCNC: 3.8 MMOL/L (ref 3.4–5.3)
PROT SERPL-MCNC: 6.6 G/DL (ref 6.8–8.8)
RBC # BLD AUTO: 4.44 10E12/L (ref 4.4–5.9)
SODIUM SERPL-SCNC: 137 MMOL/L (ref 133–144)
WBC # BLD AUTO: 9.5 10E9/L (ref 4–11)

## 2020-06-15 PROCEDURE — 80053 COMPREHEN METABOLIC PANEL: CPT | Performed by: EMERGENCY MEDICINE

## 2020-06-15 PROCEDURE — 83690 ASSAY OF LIPASE: CPT | Performed by: EMERGENCY MEDICINE

## 2020-06-15 PROCEDURE — 85025 COMPLETE CBC W/AUTO DIFF WBC: CPT | Performed by: EMERGENCY MEDICINE

## 2020-06-15 PROCEDURE — 99284 EMERGENCY DEPT VISIT MOD MDM: CPT | Mod: Z6 | Performed by: EMERGENCY MEDICINE

## 2020-06-15 PROCEDURE — 86140 C-REACTIVE PROTEIN: CPT | Performed by: EMERGENCY MEDICINE

## 2020-06-15 PROCEDURE — 99283 EMERGENCY DEPT VISIT LOW MDM: CPT | Performed by: EMERGENCY MEDICINE

## 2020-06-15 ASSESSMENT — MIFFLIN-ST. JEOR: SCORE: 1940.14

## 2020-06-15 NOTE — DISCHARGE INSTRUCTIONS
TODAY'S VISIT:  You were seen today for abdominal pain and bloody stool.  Likely secondary to ulcerative colitis.  Your labs look normal today.  Please follow-up with GI at the earliest availability.  Return to the ER for new or worsening symptoms.  -   - You should discuss all imaging/radiology tests and laboratory tests that were performed during this visit with your usual providers to ensure you continue to improve and do not need any further evaluation, testing or management.   - Please call your Primary Care team to discuss and arrange a follow-up appointment.     FOLLOW-UP:  Please make an appointment to follow up with:  -Your GI doctor as early as possible    - Have your provider review the results from today's visit with you again to make sure no further follow-up or additional testing is needed based on those results.     PRESCRIPTIONS / MEDICATIONS:  -Continue your medications as prescribed    OTHER INSTRUCTIONS:  - Do your best to stay hydrated.    RETURN TO THE EMERGENCY DEPARTMENT  Return to the Emergency Department immediately for any new or worsening symptoms or any concerns.     Remember that you can always come back to the Emergency Department if you are not able to see your regular doctor in the amount of time listed above, if you get any new symptoms, or if there is anything that worries you.

## 2020-06-15 NOTE — ED TRIAGE NOTES
Patient presents ambulatory to triage with c/o rectal bleeding. Patient reports rectal bleeding and abdominal pain for three weeks. Patient denies weakness, lightheadedness, and dizziness. Patient reports two episodes of bloody stools yesterday and none today.

## 2020-06-15 NOTE — ED AVS SNAPSHOT
[facility], [place], Emergency Department  [address] [place] [id number]  Phone:  [phone]                                    Onel White   MRN: [id number]    Department:  [facility], Emergency Department   Date of Visit:  6/15/2020           After Visit Summary Signature Page    I have received my discharge instructions, and my questions have been answered. I have discussed any challenges I see with this plan with the nurse or doctor.    ..........................................................................................................................................  Patient/Patient Representative Signature      ..........................................................................................................................................  Patient Representative Print Name and Relationship to Patient    ..................................................               ................................................  Date                                   Time    ..........................................................................................................................................  Reviewed by Signature/Title    ...................................................              ..............................................  Date                                               Time          22EPIC Rev 08/18

## 2020-06-15 NOTE — TELEPHONE ENCOUNTER
Telephone Note:    Discussed with patient.  He is feeling slightly improved over the past 2 days, decreased bowel movement frequency compared to late last week, less pain.  He is at the ED currently and it will be prudent to still complete their evaluation.  I think that if there are concerns with significant abdominal tenderness on exam, vital sign abnormalities, significant new anemia or elevated inflammatory markers, then admission to the hospital may be necessary.  If his evaluation in the ED is reassuring, then we will plan to proceed with outpatient colonoscopy in the near future and make management changes based off this.    Liang Hancock MD

## 2020-06-15 NOTE — TELEPHONE ENCOUNTER
Telephone Note    Called patient on 6/13/20 and 6/15/20 to discuss symptoms but no answer.  Previously discussed his exacerbation of symptoms with team and its noted that he reports loose stools up to 10 times daily.  Given concern for acute severe ulcerative colitis flare, recommend evaluation in the ED for stool studies, labs, possible admission for flex sig and IV steroids depending on clinical scenario at that time.  Liang Hancock MD

## 2020-06-15 NOTE — TELEPHONE ENCOUNTER
Late entry:  Spoke to Dr. Hancock around 5 pm who advised for pt to go to the ER.    Spoke to pt. He would prefer to go to the ER once arriving in [place] on Sunday. Pt given ER warning signs. Offered for Dr. Hancock to call pt on Saturday or Sunday. Pt requested Saturday as pt had a flight Sunday. Conveyed this request to Dr. Hancock.    Pt had no further questions.    Yesi Floyd RN  Gastroenterology Care Coordinator  [place], [place]

## 2020-06-15 NOTE — ED PROVIDER NOTES
[place] EMERGENCY DEPARTMENT ([facility])  Josephine 15, 2020  History     Chief Complaint   Patient presents with     Rectal Bleeding     HPI  Onel White is a 29 year old male with a history of ulcerative colitis and cannabis use disorder who presents to the Emergency Department with abdominal pain, loose stools, bloody stools.  Patient reports her last 3 weeks he has had noted crampy abdominal pain that occurs just around episodes of stooling, increased liquid stools with blood in them.  This is consistent with previous ulcerative colitis flares.  He notes no abdominal pain during the day or with activity, he notes his symptoms are usually worse at night, he notes his stools often are darker maroon with some bright red blood but no black or tarry stools.  No fevers, nausea, vomiting.  No urinary symptoms.  Pain does not radiate into the testicles.  Patient does have a history of appendectomy.    He reports at small symptoms he was having up to 6 loose stools a day, though over the last 2 days his abdominal pain has substantially improved, his stools have thickened up and he is now having about 2 stools a day.  Initially he was unable to get a hold of his GI doctor so they recommended he come to the ER.  He has since gotten a hold of his GI doctor who recommended he have labs checked in the ER, plans for close outpatient follow-up if labs are okay.    He denies fever, shortness of breath, cough.  Has been taking his medication as prescribed.  No changes in his medications.  Currently he has no symptoms.      PAST MEDICAL HISTORY:   Past Medical History:   Diagnosis Date     Abdominal pain, generalized      Anemia, unspecified      Diarrhea      Other acne      Ulcerative (chronic) enterocolitis (H) 8/3/2009     Ulcerative colitis     followed by [facility], [facility]       PAST SURGICAL HISTORY:   Past Surgical History:   Procedure Laterality Date     C APPENDECTOMY,RUPT APPENDX+ABSCESS   2006    peritonitis     COLONOSCOPY       COLONOSCOPY N/A 2019    Procedure: Combined Colonoscopy, Single Or Multiple Biopsy/Polypectomy By Biopsy;  Surgeon: Liang Hancock MD;  Location: [facility] OR     COLONOSCOPY WITH CO2 INSUFFLATION N/A 2019    Procedure: COLONOSCOPY WITH CO2 INSUFFLATION;  Surgeon: Liang Hancock MD;  Location: [facility] OR       Past medical history, past surgical history, medications, and allergies were reviewed with the patient. Additional pertinent items: None    FAMILY HISTORY:   Family History   Problem Relation Age of Onset     Gastrointestinal Disease Father         crohns/ulceratice colitis     Asthma Father      Diabetes Paternal Grandmother      Depression Paternal Grandmother        SOCIAL HISTORY:   Social History     Tobacco Use     Smoking status: Former Smoker     Types: Cigarettes     Last attempt to quit: 2012     Years since quittin.0     Smokeless tobacco: Never Used   Substance Use Topics     Alcohol use: Yes     Alcohol/week: 20.0 standard drinks     Types: 20 Standard drinks or equivalent per week     Social history was reviewed with the patient. Additional pertinent items: None      Patient's Medications   New Prescriptions    No medications on file   Previous Medications    ALBUTEROL (2.5 MG/3ML) 0.083% NEB SOLUTION    NEBULIZE THE CONTENTS OF 1 VIAL BY MOUTH EVERY 6 HOURS AS NEEDED FOR SHORTNESS OF BREATH / DYSPNEA OR WHEEZING    ALBUTEROL (PROAIR HFA/PROVENTIL HFA/VENTOLIN HFA) 108 (90 BASE) MCG/ACT INHALER    Inhale 2 puffs into the lungs every 6 hours as needed for shortness of breath / dyspnea or wheezing    ALBUTEROL (PROAIR HFA/PROVENTIL HFA/VENTOLIN HFA) 108 (90 BASE) MCG/ACT INHALER    Inhale 2 puffs into the lungs 4 times daily    ALBUTEROL (PROAIR HFA/PROVENTIL HFA/VENTOLIN HFA) 108 (90 BASE) MCG/ACT INHALER    Inhale 2 puffs into the lungs 4 times daily for 10 days    ALBUTEROL (PROVENTIL) (2.5 MG/3ML) 0.083% NEB SOLUTION     "Take 1 vial (2.5 mg) by nebulization 4 times daily    BREO ELLIPTA 100-25 MCG/INH INHALER    INHALE ONE PUFF BY MOUTH ONCE DAILY    ESCITALOPRAM (LEXAPRO) 10 MG TABLET    Take 1 tablet (10 mg) by mouth daily    MESALAMINE (LIALDA) 1.2 G EC TABLET    Take 4 tablets (4,800 mg) by mouth daily (with breakfast)    PREDNISONE (DELTASONE) 10 MG TABLET    4 tabs daily for 3 days, then 3 tabs daily for 3 days, then 2 tabs daily for 3 days, then 1 tab daily for 3 days, then stop    PREDNISONE (DELTASONE) 5 MG TABLET    Pred 40mg daily for 2 weeks, then pred 30/20/15/10/5mg daily for 1 week each, then pred 5mg every other day for 1 week until off..   Modified Medications    No medications on file   Discontinued Medications    No medications on file          Allergies   Allergen Reactions     Wheat         Review of Systems  A complete review of systems was performed with pertinent positives and negatives noted in the HPI, and all other systems negative.    Physical Exam   BP: 132/69  Pulse: 89  Temp: 98.3  F (36.8  C)  Resp: 16  Height: 190.5 cm (6' 3\")  Weight: 88.5 kg (195 lb)  SpO2: 97 %      Physical Exam   General: awake, alert, NAD  Head: normal cephalic  HEENT: pupils equal, conjugate gaze in tact  Neck: Supple  CV: regular rate and rhythm without murmur  Lungs: clear to ascultation  Abd: soft, non-tender, no guarding, no peritoneal signs  EXT: lower extremities without swelling or edema  Neuro: awake, answers questions appropriately. No focal deficits noted         ED Course        Procedures             Results for orders placed or performed during the hospital encounter of 06/15/20 (from the past 24 hour(s))   CBC with platelets differential   Result Value Ref Range    WBC 9.5 4.0 - 11.0 10e9/L    RBC Count 4.44 4.4 - 5.9 10e12/L    Hemoglobin 13.3 13.3 - 17.7 g/dL    Hematocrit 41.0 40.0 - 53.0 %    MCV 92 78 - 100 fl    MCH 30.0 26.5 - 33.0 pg    MCHC 32.4 31.5 - 36.5 g/dL    RDW 13.2 10.0 - 15.0 %    Platelet " Count 260 150 - 450 10e9/L    Diff Method Automated Method     % Neutrophils 62.0 %    % Lymphocytes 21.4 %    % Monocytes 9.7 %    % Eosinophils 6.3 %    % Basophils 0.3 %    % Immature Granulocytes 0.3 %    Nucleated RBCs 0 0 /100    Absolute Neutrophil 5.9 1.6 - 8.3 10e9/L    Absolute Lymphocytes 2.0 0.8 - 5.3 10e9/L    Absolute Monocytes 0.9 0.0 - 1.3 10e9/L    Absolute Eosinophils 0.6 0.0 - 0.7 10e9/L    Absolute Basophils 0.0 0.0 - 0.2 10e9/L    Abs Immature Granulocytes 0.0 0 - 0.4 10e9/L    Absolute Nucleated RBC 0.0    Comprehensive metabolic panel   Result Value Ref Range    Sodium 137 133 - 144 mmol/L    Potassium 3.8 3.4 - 5.3 mmol/L    Chloride 104 94 - 109 mmol/L    Carbon Dioxide 29 20 - 32 mmol/L    Anion Gap 4 3 - 14 mmol/L    Glucose 87 70 - 99 mg/dL    Urea Nitrogen 13 7 - 30 mg/dL    Creatinine 0.94 0.66 - 1.25 mg/dL    GFR Estimate >90 >60 mL/min/[1.73_m2]    GFR Estimate If Black >90 >60 mL/min/[1.73_m2]    Calcium 8.6 8.5 - 10.1 mg/dL    Bilirubin Total 0.2 0.2 - 1.3 mg/dL    Albumin 2.9 (L) 3.4 - 5.0 g/dL    Protein Total 6.6 (L) 6.8 - 8.8 g/dL    Alkaline Phosphatase 258 (H) 40 - 150 U/L    ALT 64 0 - 70 U/L    AST 28 0 - 45 U/L   Lipase   Result Value Ref Range    Lipase 149 73 - 393 U/L     Medications - No data to display          Assessments & Plan (with Medical Decision Making)   Patient is well-appearing, nontoxic, normal vital signs and a benign abdominal exam.  Patient currently is not endorsing any pain and endorses improvement of his recent loose bloody stools.    Differential include ulcerative colitis flare, acute anemia, electrolyte abnormality, some other type of acute intra-abdominal pathology.    Labs show normal white count, no left shift.  Patient has a hemoglobin of 13.3 which is a 1 g drop from a year ago. Patient's electrolytes are normal.  Normal lipase.    On reevaluation patient continued to feel well, was eating a meal from [employer].  At this point he has  been in contact with his GI doctor and they can facilitate close follow-up.  Given normal vital signs, normal laboratory studies, no abdominal pain and are normal abdominal exam will discharge home.  Was given strict ER return precautions.  Patient understands this.  He will follow-up with GI in the next couple days.    I have reviewed the nursing notes.    I have reviewed the findings, diagnosis, plan and need for follow up with the patient.    New Prescriptions    No medications on file       Final diagnoses:   Ulcerative colitis with rectal bleeding, unspecified location (H)       6/15/2020   [facility], [place], EMERGENCY DEPARTMENT     Roddy Ly MD  06/15/20 2948

## 2020-06-16 DIAGNOSIS — K51.011 ULCERATIVE PANCOLITIS WITH RECTAL BLEEDING (H): Primary | ICD-10-CM

## 2020-06-16 NOTE — TELEPHONE ENCOUNTER
Patient discharged from ED and was told to call clinic once discharged. Also, wants to discuss scheduling colonoscopy - would like to have done this week.

## 2020-06-17 ENCOUNTER — TELEPHONE (OUTPATIENT)
Dept: GASTROENTEROLOGY | Facility: CLINIC | Age: 30
End: 2020-06-17

## 2020-06-17 DIAGNOSIS — Z11.59 ENCOUNTER FOR SCREENING FOR OTHER VIRAL DISEASES: Primary | ICD-10-CM

## 2020-06-17 LAB — CRP SERPL-MCNC: 9.7 MG/L (ref 0–8)

## 2020-06-17 RX ORDER — SODIUM, POTASSIUM,MAG SULFATES 17.5-3.13G
1 SOLUTION, RECONSTITUTED, ORAL ORAL SEE ADMIN INSTRUCTIONS
Qty: 2 BOTTLE | Refills: 0 | Status: SHIPPED | OUTPATIENT
Start: 2020-06-17 | End: 2020-07-07

## 2020-06-17 RX ORDER — BISACODYL 5 MG/1
15 TABLET, DELAYED RELEASE ORAL SEE ADMIN INSTRUCTIONS
Qty: 3 TABLET | Refills: 0 | Status: SHIPPED | OUTPATIENT
Start: 2020-06-17 | End: 2020-07-07

## 2020-06-18 NOTE — TELEPHONE ENCOUNTER
Discussed with pt. Informed him that the other test is Covid Antibody and would not be appropriate for pre-op for procedure.    Pt vocalizes understanding and does not have any questions at this time.    Yesi Floyd RN  Gastroenterology Care Coordinator  [place], [place]

## 2020-06-18 NOTE — TELEPHONE ENCOUNTER
Reminded pt of stool studies.     He will go in today to  stool study cup.      Yesi Floyd RN  Gastroenterology Care Coordinator  [facility] [place]

## 2020-06-21 DIAGNOSIS — K51.011 ULCERATIVE PANCOLITIS WITH RECTAL BLEEDING (H): ICD-10-CM

## 2020-06-21 LAB
C DIFF TOX B STL QL: NEGATIVE
SPECIMEN SOURCE: NORMAL

## 2020-06-21 PROCEDURE — 83993 ASSAY FOR CALPROTECTIN FECAL: CPT | Performed by: INTERNAL MEDICINE

## 2020-06-21 PROCEDURE — 87177 OVA AND PARASITES SMEARS: CPT | Performed by: INTERNAL MEDICINE

## 2020-06-21 PROCEDURE — 36415 COLL VENOUS BLD VENIPUNCTURE: CPT | Performed by: INTERNAL MEDICINE

## 2020-06-21 PROCEDURE — 87329 GIARDIA AG IA: CPT | Performed by: INTERNAL MEDICINE

## 2020-06-21 PROCEDURE — 87328 CRYPTOSPORIDIUM AG IA: CPT | Performed by: INTERNAL MEDICINE

## 2020-06-21 PROCEDURE — 87493 C DIFF AMPLIFIED PROBE: CPT | Performed by: INTERNAL MEDICINE

## 2020-06-21 PROCEDURE — 87209 SMEAR COMPLEX STAIN: CPT | Performed by: INTERNAL MEDICINE

## 2020-06-22 DIAGNOSIS — K51.011 ULCERATIVE PANCOLITIS WITH RECTAL BLEEDING (H): ICD-10-CM

## 2020-06-22 LAB
ALBUMIN SERPL-MCNC: 3.4 G/DL (ref 3.4–5)
ALP SERPL-CCNC: 299 U/L (ref 40–150)
ALT SERPL W P-5'-P-CCNC: 165 U/L (ref 0–70)
ANION GAP SERPL CALCULATED.3IONS-SCNC: 4 MMOL/L (ref 3–14)
AST SERPL W P-5'-P-CCNC: 112 U/L (ref 0–45)
BASOPHILS # BLD AUTO: 0.1 10E9/L (ref 0–0.2)
BASOPHILS NFR BLD AUTO: 0.5 %
BILIRUB SERPL-MCNC: 0.3 MG/DL (ref 0.2–1.3)
BUN SERPL-MCNC: 11 MG/DL (ref 7–30)
C PARVUM AG STL QL IA: NEGATIVE
CALCIUM SERPL-MCNC: 8.5 MG/DL (ref 8.5–10.1)
CALPROTECTIN STL-MCNT: 1060 MG/KG (ref 0–49.9)
CHLORIDE SERPL-SCNC: 104 MMOL/L (ref 94–109)
CO2 SERPL-SCNC: 29 MMOL/L (ref 20–32)
CREAT SERPL-MCNC: 0.82 MG/DL (ref 0.66–1.25)
CRP SERPL-MCNC: 4.1 MG/L (ref 0–8)
DIFFERENTIAL METHOD BLD: ABNORMAL
EOSINOPHIL NFR BLD AUTO: 4 %
ERYTHROCYTE [DISTWIDTH] IN BLOOD BY AUTOMATED COUNT: 13.3 % (ref 10–15)
G LAMBLIA AG STL QL IA: NEGATIVE
GFR SERPL CREATININE-BSD FRML MDRD: >90 ML/MIN/{1.73_M2}
GLUCOSE SERPL-MCNC: 115 MG/DL (ref 70–99)
HCT VFR BLD AUTO: 43.9 % (ref 40–53)
HGB BLD-MCNC: 14.1 G/DL (ref 13.3–17.7)
IMM GRANULOCYTES # BLD: 0.1 10E9/L (ref 0–0.4)
IMM GRANULOCYTES NFR BLD: 0.5 %
LYMPHOCYTES # BLD AUTO: 0.8 10E9/L (ref 0.8–5.3)
LYMPHOCYTES NFR BLD AUTO: 5.7 %
MCH RBC QN AUTO: 30.1 PG (ref 26.5–33)
MCHC RBC AUTO-ENTMCNC: 32.1 G/DL (ref 31.5–36.5)
MCV RBC AUTO: 94 FL (ref 78–100)
MONOCYTES # BLD AUTO: 0.3 10E9/L (ref 0–1.3)
MONOCYTES NFR BLD AUTO: 2.2 %
NEUTROPHILS # BLD AUTO: 12.5 10E9/L (ref 1.6–8.3)
NEUTROPHILS NFR BLD AUTO: 87.1 %
NRBC # BLD AUTO: 0 10*3/UL
NRBC BLD AUTO-RTO: 0 /100
O+P STL MICRO: NORMAL
PLATELET # BLD AUTO: 297 10E9/L (ref 150–450)
POTASSIUM SERPL-SCNC: 4 MMOL/L (ref 3.4–5.3)
PROT SERPL-MCNC: 7.8 G/DL (ref 6.8–8.8)
RBC # BLD AUTO: 4.68 10E12/L (ref 4.4–5.9)
SODIUM SERPL-SCNC: 137 MMOL/L (ref 133–144)
SPECIMEN SOURCE: NORMAL
SPECIMEN SOURCE: NORMAL
WBC # BLD AUTO: 14.3 10E9/L (ref 4–11)

## 2020-06-22 PROCEDURE — 80053 COMPREHEN METABOLIC PANEL: CPT | Performed by: INTERNAL MEDICINE

## 2020-06-22 PROCEDURE — 86704 HEP B CORE ANTIBODY TOTAL: CPT | Performed by: INTERNAL MEDICINE

## 2020-06-22 PROCEDURE — 85025 COMPLETE CBC W/AUTO DIFF WBC: CPT | Performed by: INTERNAL MEDICINE

## 2020-06-22 PROCEDURE — 36415 COLL VENOUS BLD VENIPUNCTURE: CPT | Performed by: INTERNAL MEDICINE

## 2020-06-22 PROCEDURE — 86706 HEP B SURFACE ANTIBODY: CPT | Performed by: INTERNAL MEDICINE

## 2020-06-22 PROCEDURE — 87340 HEPATITIS B SURFACE AG IA: CPT | Performed by: INTERNAL MEDICINE

## 2020-06-22 PROCEDURE — 86708 HEPATITIS A ANTIBODY: CPT | Performed by: INTERNAL MEDICINE

## 2020-06-22 PROCEDURE — 86481 TB AG RESPONSE T-CELL SUSP: CPT | Performed by: INTERNAL MEDICINE

## 2020-06-22 PROCEDURE — 86140 C-REACTIVE PROTEIN: CPT | Performed by: INTERNAL MEDICINE

## 2020-06-23 ENCOUNTER — ANESTHESIA EVENT (OUTPATIENT)
Dept: SURGERY | Facility: AMBULATORY SURGERY CENTER | Age: 30
End: 2020-06-23

## 2020-06-23 LAB
HAV IGG SER QL IA: NONREACTIVE
HBV CORE AB SERPL QL IA: NONREACTIVE
HBV SURFACE AB SERPL IA-ACNC: 0 M[IU]/ML
HBV SURFACE AG SERPL QL IA: NONREACTIVE

## 2020-06-24 ENCOUNTER — ANESTHESIA (OUTPATIENT)
Dept: SURGERY | Facility: AMBULATORY SURGERY CENTER | Age: 30
End: 2020-06-24
Payer: COMMERCIAL

## 2020-06-24 ENCOUNTER — TELEPHONE (OUTPATIENT)
Dept: GASTROENTEROLOGY | Facility: CLINIC | Age: 30
End: 2020-06-24

## 2020-06-24 DIAGNOSIS — Z11.59 ENCOUNTER FOR SCREENING FOR OTHER VIRAL DISEASES: ICD-10-CM

## 2020-06-24 LAB
GAMMA INTERFERON BACKGROUND BLD IA-ACNC: 0.03 IU/ML
M TB IFN-G BLD-IMP: NEGATIVE
M TB IFN-G CD4+ BCKGRND COR BLD-ACNC: 2.17 IU/ML
MITOGEN IGNF BCKGRD COR BLD-ACNC: 0 IU/ML
MITOGEN IGNF BCKGRD COR BLD-ACNC: 0 IU/ML
SARS-COV-2 PCR COMMENT: NORMAL
SARS-COV-2 RNA SPEC QL NAA+PROBE: NEGATIVE
SARS-COV-2 RNA SPEC QL NAA+PROBE: NORMAL
SPECIMEN SOURCE: NORMAL
SPECIMEN SOURCE: NORMAL

## 2020-06-24 NOTE — TELEPHONE ENCOUNTER
[facility] Call Center    Phone Message    May a detailed message be left on voicemail: yes     Reason for Call: Mom is requesting a call to discuss lab results that came back and some concerns has regarding the results and the patient's upcoming procedure. Ovaparasite test showed a few PMN's and mom is just looking for clarification on what that is.  Thank you.     Action Taken: Message routed to:  Adult Clinics: Gastroenterology (GI) p [id number]    Travel Screening: Not Applicable

## 2020-06-24 NOTE — TELEPHONE ENCOUNTER
Spoke to pt's mom and answered all questions.    Yesi Floyd RN  Gastroenterology Care Coordinator  [place], [place]

## 2020-06-26 ENCOUNTER — HOSPITAL ENCOUNTER (OUTPATIENT)
Facility: AMBULATORY SURGERY CENTER | Age: 30
Discharge: HOME | End: 2020-06-26
Attending: INTERNAL MEDICINE | Admitting: INTERNAL MEDICINE
Payer: COMMERCIAL

## 2020-06-26 ENCOUNTER — SURGERY (OUTPATIENT)
Age: 30
End: 2020-06-26
Payer: COMMERCIAL

## 2020-06-26 VITALS
RESPIRATION RATE: 16 BRPM | OXYGEN SATURATION: 95 % | DIASTOLIC BLOOD PRESSURE: 72 MMHG | HEART RATE: 99 BPM | SYSTOLIC BLOOD PRESSURE: 120 MMHG | TEMPERATURE: 97.7 F

## 2020-06-26 DIAGNOSIS — Z12.11 SPECIAL SCREENING FOR MALIGNANT NEOPLASMS, COLON: Primary | ICD-10-CM

## 2020-06-26 DIAGNOSIS — K51.011 ULCERATIVE PANCOLITIS WITH RECTAL BLEEDING (H): ICD-10-CM

## 2020-06-26 DIAGNOSIS — R79.89 ABNORMAL LFTS: Primary | ICD-10-CM

## 2020-06-26 LAB — COLONOSCOPY: NORMAL

## 2020-06-26 PROCEDURE — 45380 COLONOSCOPY AND BIOPSY: CPT | Performed by: INTERNAL MEDICINE

## 2020-06-26 PROCEDURE — G8918 PT W/O PREOP ORDER IV AB PRO: HCPCS

## 2020-06-26 PROCEDURE — 88341 IMHCHEM/IMCYTCHM EA ADD ANTB: CPT | Performed by: INTERNAL MEDICINE

## 2020-06-26 PROCEDURE — 88342 IMHCHEM/IMCYTCHM 1ST ANTB: CPT | Performed by: INTERNAL MEDICINE

## 2020-06-26 PROCEDURE — G8907 PT DOC NO EVENTS ON DISCHARG: HCPCS

## 2020-06-26 PROCEDURE — 45380 COLONOSCOPY AND BIOPSY: CPT

## 2020-06-26 PROCEDURE — 88305 TISSUE EXAM BY PATHOLOGIST: CPT | Performed by: INTERNAL MEDICINE

## 2020-06-26 RX ORDER — MEPERIDINE HYDROCHLORIDE 25 MG/ML
12.5 INJECTION INTRAMUSCULAR; INTRAVENOUS; SUBCUTANEOUS
Status: DISCONTINUED | OUTPATIENT
Start: 2020-06-26 | End: 2020-06-27 | Stop reason: HOSPADM

## 2020-06-26 RX ORDER — ONDANSETRON 4 MG/1
4 TABLET, ORALLY DISINTEGRATING ORAL EVERY 6 HOURS PRN
Status: DISCONTINUED | OUTPATIENT
Start: 2020-06-26 | End: 2020-06-27 | Stop reason: HOSPADM

## 2020-06-26 RX ORDER — ONDANSETRON 2 MG/ML
4 INJECTION INTRAMUSCULAR; INTRAVENOUS
Status: DISCONTINUED | OUTPATIENT
Start: 2020-06-26 | End: 2020-06-27 | Stop reason: HOSPADM

## 2020-06-26 RX ORDER — PROPOFOL 10 MG/ML
INJECTION, EMULSION INTRAVENOUS PRN
Status: DISCONTINUED | OUTPATIENT
Start: 2020-06-26 | End: 2020-06-26

## 2020-06-26 RX ORDER — HEPARIN SODIUM (PORCINE) LOCK FLUSH IV SOLN 100 UNIT/ML 100 UNIT/ML
5 SOLUTION INTRAVENOUS
Status: CANCELLED | OUTPATIENT
Start: 2020-06-26

## 2020-06-26 RX ORDER — LIDOCAINE 40 MG/G
CREAM TOPICAL
Status: DISCONTINUED | OUTPATIENT
Start: 2020-06-26 | End: 2020-06-27 | Stop reason: HOSPADM

## 2020-06-26 RX ORDER — ONDANSETRON 2 MG/ML
4 INJECTION INTRAMUSCULAR; INTRAVENOUS EVERY 30 MIN PRN
Status: DISCONTINUED | OUTPATIENT
Start: 2020-06-26 | End: 2020-06-27 | Stop reason: HOSPADM

## 2020-06-26 RX ORDER — FENTANYL CITRATE 50 UG/ML
25-50 INJECTION, SOLUTION INTRAMUSCULAR; INTRAVENOUS
Status: DISCONTINUED | OUTPATIENT
Start: 2020-06-26 | End: 2020-06-27 | Stop reason: HOSPADM

## 2020-06-26 RX ORDER — HEPARIN SODIUM,PORCINE 10 UNIT/ML
5 VIAL (ML) INTRAVENOUS
Status: CANCELLED | OUTPATIENT
Start: 2020-06-26

## 2020-06-26 RX ORDER — NALOXONE HYDROCHLORIDE 0.4 MG/ML
.1-.4 INJECTION, SOLUTION INTRAMUSCULAR; INTRAVENOUS; SUBCUTANEOUS
Status: DISCONTINUED | OUTPATIENT
Start: 2020-06-26 | End: 2020-06-27 | Stop reason: HOSPADM

## 2020-06-26 RX ORDER — OXYCODONE HYDROCHLORIDE 5 MG/1
5 TABLET ORAL EVERY 4 HOURS PRN
Status: DISCONTINUED | OUTPATIENT
Start: 2020-06-26 | End: 2020-06-27 | Stop reason: HOSPADM

## 2020-06-26 RX ORDER — ONDANSETRON 4 MG/1
4 TABLET, ORALLY DISINTEGRATING ORAL EVERY 30 MIN PRN
Status: DISCONTINUED | OUTPATIENT
Start: 2020-06-26 | End: 2020-06-27 | Stop reason: HOSPADM

## 2020-06-26 RX ORDER — SODIUM CHLORIDE, SODIUM LACTATE, POTASSIUM CHLORIDE, CALCIUM CHLORIDE 600; 310; 30; 20 MG/100ML; MG/100ML; MG/100ML; MG/100ML
INJECTION, SOLUTION INTRAVENOUS CONTINUOUS
Status: DISCONTINUED | OUTPATIENT
Start: 2020-06-26 | End: 2020-06-27 | Stop reason: HOSPADM

## 2020-06-26 RX ORDER — LIDOCAINE HYDROCHLORIDE 20 MG/ML
INJECTION, SOLUTION INFILTRATION; PERINEURAL PRN
Status: DISCONTINUED | OUTPATIENT
Start: 2020-06-26 | End: 2020-06-26

## 2020-06-26 RX ORDER — ONDANSETRON 2 MG/ML
4 INJECTION INTRAMUSCULAR; INTRAVENOUS EVERY 6 HOURS PRN
Status: DISCONTINUED | OUTPATIENT
Start: 2020-06-26 | End: 2020-06-27 | Stop reason: HOSPADM

## 2020-06-26 RX ORDER — FLUMAZENIL 0.1 MG/ML
0.2 INJECTION, SOLUTION INTRAVENOUS
Status: SHIPPED | OUTPATIENT
Start: 2020-06-26 | End: 2020-06-26

## 2020-06-26 RX ADMIN — PROPOFOL 30 MG: 10 INJECTION, EMULSION INTRAVENOUS at 10:35

## 2020-06-26 RX ADMIN — PROPOFOL 100 MG: 10 INJECTION, EMULSION INTRAVENOUS at 10:30

## 2020-06-26 RX ADMIN — PROPOFOL 30 MG: 10 INJECTION, EMULSION INTRAVENOUS at 10:46

## 2020-06-26 RX ADMIN — PROPOFOL 30 MG: 10 INJECTION, EMULSION INTRAVENOUS at 10:38

## 2020-06-26 RX ADMIN — PROPOFOL 30 MG: 10 INJECTION, EMULSION INTRAVENOUS at 10:42

## 2020-06-26 RX ADMIN — PROPOFOL 100 MG: 10 INJECTION, EMULSION INTRAVENOUS at 10:24

## 2020-06-26 RX ADMIN — PROPOFOL 30 MG: 10 INJECTION, EMULSION INTRAVENOUS at 10:31

## 2020-06-26 RX ADMIN — PROPOFOL 30 MG: 10 INJECTION, EMULSION INTRAVENOUS at 10:33

## 2020-06-26 RX ADMIN — PROPOFOL 100 MG: 10 INJECTION, EMULSION INTRAVENOUS at 10:28

## 2020-06-26 RX ADMIN — SODIUM CHLORIDE, SODIUM LACTATE, POTASSIUM CHLORIDE, CALCIUM CHLORIDE 50 ML/HR: 600; 310; 30; 20 INJECTION, SOLUTION INTRAVENOUS at 09:50

## 2020-06-26 RX ADMIN — LIDOCAINE HYDROCHLORIDE 60 MG: 20 INJECTION, SOLUTION INFILTRATION; PERINEURAL at 10:24

## 2020-06-26 RX ADMIN — PROPOFOL 100 MG: 10 INJECTION, EMULSION INTRAVENOUS at 10:26

## 2020-06-26 NOTE — H&P
ENDOSCOPY PRE-SEDATION H&P FOR OUTPATIENT PROCEDURES    Onel White  [id number]  1990    Procedure: Colonoscopy    Pre-procedure diagnosis: Ulcerative colitis    Past medical history:   Past Medical History:   Diagnosis Date     Abdominal pain, generalized      Anemia, unspecified      Diarrhea      Other acne      Ulcerative (chronic) enterocolitis (H) 8/3/2009     Ulcerative colitis     followed by [facility], [facility]       Past surgical history:   Past Surgical History:   Procedure Laterality Date     C APPENDECTOMY,RUPT APPENDX+ABSCESS  2006    peritonitis     COLONOSCOPY       COLONOSCOPY N/A 4/17/2019    Procedure: Combined Colonoscopy, Single Or Multiple Biopsy/Polypectomy By Biopsy;  Surgeon: Liang Hancock MD;  Location: [facility] OR     COLONOSCOPY WITH CO2 INSUFFLATION N/A 4/17/2019    Procedure: COLONOSCOPY WITH CO2 INSUFFLATION;  Surgeon: Liang Hancock MD;  Location: [facility] OR       Current Outpatient Medications   Medication     albuterol (2.5 MG/3ML) 0.083% neb solution     albuterol (PROAIR HFA/PROVENTIL HFA/VENTOLIN HFA) 108 (90 Base) MCG/ACT inhaler     bisacodyl (DULCOLAX) 5 MG EC tablet     BREO ELLIPTA 100-25 MCG/INH inhaler     escitalopram (LEXAPRO) 10 MG tablet     mesalamine (LIALDA) 1.2 g EC tablet     Na Sulfate-K Sulfate-Mg Sulf (SUPREP BOWEL PREP KIT) solution     Na Sulfate-K Sulfate-Mg Sulf (SUPREP BOWEL PREP KIT) solution     polyethylene glycol (GOLYTELY) 236 g suspension     polyethylene glycol (GOLYTELY) 236 g suspension     predniSONE (DELTASONE) 10 MG tablet     Simethicone 125 MG TABS     Simethicone 125 MG TABS     albuterol (PROAIR HFA/PROVENTIL HFA/VENTOLIN HFA) 108 (90 Base) MCG/ACT inhaler     albuterol (PROAIR HFA/PROVENTIL HFA/VENTOLIN HFA) 108 (90 Base) MCG/ACT inhaler     albuterol (PROVENTIL) (2.5 MG/3ML) 0.083% neb solution     predniSONE (DELTASONE) 5 MG tablet     Current Facility-Administered Medications   Medication      lidocaine (LMX4) kit     lidocaine 1 % 0.1-1 mL     ondansetron (ZOFRAN) injection 4 mg     sodium chloride (PF) 0.9% PF flush 3 mL     sodium chloride (PF) 0.9% PF flush 3 mL       Allergies   Allergen Reactions     Wheat        History of Anesthesia/Sedation Problems: no    Physical Exam:    Mental status: alert or interactive  Heart: Normal  Lung: Normal  Assessment of patient's airway: Normal  Other as pertinent for procedure: None     ASA Score: See Provation note    Mallampati score:  I - Faucial pillars, soft palate, and uvula are visible    Assessment/Plan:     The patient is an appropriate candidate to receive sedation.  Has been using rescue inhaler more frequently recently along with prednisone.  Lungs are clear today, risk/benefit weighs in favor of proceeding with the procedure.    Informed consent was discussed with the patient/family, including the risks, benefits, potential complications and any alternative options associated with sedation.    Patient assessment completed just prior to sedation and while under constant observation by the provider. Condition determined to be adequate for proceeding with sedation.    The specific risks for the procedure were discussed with the patient at the time of informed consent and include but are not limited to perforation which could require surgery, missing significant neoplasm or lesion, hemorrhage and adverse sedative complication.      Liang Hancock MD

## 2020-06-26 NOTE — ANESTHESIA CARE TRANSFER NOTE
Patient: Onel A Cindy    Procedure(s):  COLONOSCOPY, WITH CO2 INSUFFLATION  Colonoscopy, With Polypectomy And Biopsy    Diagnosis: Ulcerative pancolitis with rectal bleeding (H) [K51.011]  Diagnosis Additional Information: No value filed.    Anesthesia Type:   MAC     Note:  Airway :Room Air  Patient transferred to:Phase II  Comments: Patient awake, alert, and oriented. SpO2 96%.  No apparent anesthesia complications. Handoff Report: Identifed the Patient, Identified the Reponsible Provider, Reviewed the pertinent medical history, Discussed the surgical course, Reviewed Intra-OP anesthesia mangement and issues during anesthesia, Set expectations for post-procedure period and Allowed opportunity for questions and acknowledgement of understanding      Vitals: (Last set prior to Anesthesia Care Transfer)    CRNA VITALS  6/26/2020 1022 - 6/26/2020 1057      6/26/2020             EKG:  NSR                Electronically Signed By: CECIL Lauren CRNA  June 26, 2020  10:57 AM

## 2020-06-26 NOTE — ANESTHESIA PREPROCEDURE EVALUATION
"Anesthesia Pre-Procedure Evaluation    Patient: Onel White   MRN:     [id number] Gender:   male   Age:    29 year old :      1990        Preoperative Diagnosis: Ulcerative pancolitis with rectal bleeding (H) [K51.011]   Procedure(s):  COLONOSCOPY, WITH CO2 INSUFFLATION     LABS:  CBC:   Lab Results   Component Value Date    WBC 14.3 (H) 2020    WBC 9.5 06/15/2020    HGB 14.1 2020    HGB 13.3 06/15/2020    HCT 43.9 2020    HCT 41.0 06/15/2020     2020     06/15/2020     BMP:   Lab Results   Component Value Date     2020     06/15/2020    POTASSIUM 4.0 2020    POTASSIUM 3.8 06/15/2020    CHLORIDE 104 2020    CHLORIDE 104 06/15/2020    CO2 29 2020    CO2 29 06/15/2020    BUN 11 2020    BUN 13 06/15/2020    CR 0.82 2020    CR 0.94 06/15/2020     (H) 2020    GLC 87 06/15/2020     COAGS:   Lab Results   Component Value Date    PTT 30 2009    INR 1.00 2009     POC: No results found for: BGM, HCG, HCGS  OTHER:   Lab Results   Component Value Date    SASHA 8.5 2020    PHOS 4.6 2009    ALBUMIN 3.4 2020    PROTTOTAL 7.8 2020     (H) 2020     (H) 2020    GGT 29 11/10/2010    ALKPHOS 299 (H) 2020    BILITOTAL 0.3 2020    LIPASE 149 06/15/2020    TSH 2.01 01/15/2008    CRP 4.1 2020    SED 8 2012        Preop Vitals    BP Readings from Last 3 Encounters:   20 (!) 138/94   06/15/20 (!) 164/100   20 (!) 146/94    Pulse Readings from Last 3 Encounters:   20 99   06/15/20 67   20 66      Resp Readings from Last 3 Encounters:   20 18   06/15/20 16   19 16    SpO2 Readings from Last 3 Encounters:   20 99%   06/15/20 99%   20 97%      Temp Readings from Last 1 Encounters:   20 98.1  F (36.7  C) (Temporal)    Ht Readings from Last 1 Encounters:   06/15/20 1.905 m (6' 3\")      Wt Readings from " "Last 1 Encounters:   06/15/20 88.5 kg (195 lb)    Estimated body mass index is 24.37 kg/m  as calculated from the following:    Height as of 6/15/20: 1.905 m (6' 3\").    Weight as of 6/15/20: 88.5 kg (195 lb).     LDA:        Past Medical History:   Diagnosis Date     Abdominal pain, generalized      Anemia, unspecified      Diarrhea      Other acne      Ulcerative (chronic) enterocolitis (H) 8/3/2009     Ulcerative colitis     followed by [facility]      Past Surgical History:   Procedure Laterality Date     C APPENDECTOMY,RUPT APPENDX+ABSCESS  2006    peritonitis     COLONOSCOPY       COLONOSCOPY N/A 4/17/2019    Procedure: Combined Colonoscopy, Single Or Multiple Biopsy/Polypectomy By Biopsy;  Surgeon: Liang Hancock MD;  Location: [facility] OR     COLONOSCOPY WITH CO2 INSUFFLATION N/A 4/17/2019    Procedure: COLONOSCOPY WITH CO2 INSUFFLATION;  Surgeon: Liang Hancock MD;  Location: [facility] OR      Allergies   Allergen Reactions     Wheat         Anesthesia Evaluation     . Pt has had prior anesthetic. Type: General and MAC    No history of anesthetic complications          ROS/MED HX    ENT/Pulmonary: Comment: Required prednisone this past week.  Breathing is now better without wheezing.    (+)asthma Last exacerbation: This past week,Treatment: Oral steroids and Inhaler prn,  , . .    Neurologic:  - neg neurologic ROS     Cardiovascular:  - neg cardiovascular ROS       METS/Exercise Tolerance:     Hematologic:  - neg hematologic  ROS       Musculoskeletal:  - neg musculoskeletal ROS       GI/Hepatic: Comment: Ulcerative collitis    (+) Inflammatory bowel disease,       Renal/Genitourinary:  - ROS Renal section negative       Endo:  - neg endo ROS       Psychiatric:  - neg psychiatric ROS       Infectious Disease:  - neg infectious disease ROS       Malignancy:      - no malignancy   Other:    - neg other ROS                     PHYSICAL EXAM:   Mental Status/Neuro: A/A/O "   Airway: Facies: Feasible  Mallampati: I  Mouth/Opening: Full  TM distance: > 6 cm  Neck ROM: Full   Respiratory: Auscultation: CTAB     Resp. Rate: Normal     Resp. Effort: Normal      CV: Rhythm: Regular  Rate: Age appropriate  Heart: Normal Sounds  Edema: None   Comments: No wheezing noted.     Dental: Normal Dentition                Assessment:   ASA SCORE: 3    H&P: History and physical reviewed and following examination; no interval change.   Smoking Status:  Non-Smoker/Unknown   NPO Status: NPO Appropriate     Plan:   Anes. Type:  MAC   Pre-Medication: None   Induction:  IV (Standard)   Airway: Native Airway   Access/Monitoring: PIV   Maintenance: Propofol Sedation     Postop Plan:   Postop Pain: None  Postop Sedation/Airway: Not planned     PONV Management:   Adult Risk Factors:, Non-Smoker   Prevention: Ondansetron, Dexamethasone, Propofol     CONSENT: Direct conversation   Plan and risks discussed with: Patient   Blood Products: Consent Deferred (Minimal Blood Loss)                   Lazarus Li MD

## 2020-06-26 NOTE — ANESTHESIA POSTPROCEDURE EVALUATION
Anesthesia POST Procedure Evaluation    Patient: Onel White   MRN:     [id number] Gender:   male   Age:    29 year old :      1990        Preoperative Diagnosis: Ulcerative pancolitis with rectal bleeding (H) [K51.011]   Procedure(s):  COLONOSCOPY, WITH CO2 INSUFFLATION  Colonoscopy, With Polypectomy And Biopsy   Postop Comments: No value filed.     Anesthesia Type: MAC       Disposition: Outpatient   Postop Pain Control: Uneventful            Sign Out: Well controlled pain   PONV: No   Neuro/Psych: Uneventful            Sign Out: Acceptable/Baseline neuro status   Airway/Respiratory: Uneventful            Sign Out: Acceptable/Baseline resp. status   CV/Hemodynamics: Uneventful            Sign Out: Acceptable CV status   Other NRE: NONE   DID A NON-ROUTINE EVENT OCCUR? No         Last Anesthesia Record Vitals:  CRNA VITALS  2020 1022 - 2020 1122      2020             EKG:  NSR          Last PACU Vitals:  Vitals Value Taken Time   /69 2020 10:57 AM   Temp 97.7  F (36.5  C) 2020 10:57 AM   Pulse     Resp 16 2020 10:57 AM   SpO2 95 % 2020 10:57 AM   Temp src     NIBP 103/55 2020 10:50 AM   Pulse     SpO2 97 % 2020 10:49 AM   Resp     Temp     Ht Rate 0 2020 10:48 AM   Temp 2           Electronically Signed By: Lazarus Li MD, 2020, 11:50 AM

## 2020-07-01 LAB — COPATH REPORT: NORMAL

## 2020-07-05 ENCOUNTER — TELEPHONE (OUTPATIENT)
Dept: URGENT CARE | Facility: RETAIL CLINIC | Age: 30
End: 2020-07-05

## 2020-07-05 DIAGNOSIS — J20.9 ACUTE BRONCHITIS WITH SYMPTOMS > 10 DAYS: ICD-10-CM

## 2020-07-06 RX ORDER — ALBUTEROL SULFATE 90 UG/1
AEROSOL, METERED RESPIRATORY (INHALATION)
Qty: 18 G | Refills: 0 | OUTPATIENT
Start: 2020-07-06

## 2020-07-06 NOTE — TELEPHONE ENCOUNTER
RX denied. Has has NO PCP here. Needs apt.  Will fowrard to schedulers to call and schedule lux. .......MIRACLE Kimble

## 2020-07-06 NOTE — TELEPHONE ENCOUNTER
Patient states  He has seen Dr Hinds before.,       Thanks  Yareli Simpson [facility]   [phone]

## 2020-07-06 NOTE — TELEPHONE ENCOUNTER
Called Onel and relayed message.  An appointment to establish care and med refill is made for him 07/07/2020 with Chun Herr.

## 2020-07-07 ENCOUNTER — VIRTUAL VISIT (OUTPATIENT)
Dept: FAMILY MEDICINE | Facility: CLINIC | Age: 30
End: 2020-07-07
Payer: COMMERCIAL

## 2020-07-07 DIAGNOSIS — J45.41 MODERATE PERSISTENT ASTHMA WITH ACUTE EXACERBATION: ICD-10-CM

## 2020-07-07 DIAGNOSIS — F12.90 MARIJUANA SMOKER: ICD-10-CM

## 2020-07-07 PROCEDURE — 99214 OFFICE O/P EST MOD 30 MIN: CPT | Mod: 95 | Performed by: PHYSICIAN ASSISTANT

## 2020-07-07 RX ORDER — ALBUTEROL SULFATE 90 UG/1
1-2 AEROSOL, METERED RESPIRATORY (INHALATION) EVERY 4 HOURS PRN
Qty: 18 G | Refills: 0 | Status: SHIPPED | OUTPATIENT
Start: 2020-07-07 | End: 2020-09-28

## 2020-07-07 NOTE — NURSING NOTE
Health Maintenance Due   Topic Date Due     PREVENTIVE CARE VISIT  08/09/2007     ASTHMA ACTION PLAN  12/20/2018     ASTHMA CONTROL TEST  03/27/2019     Felecia ANDRADE LPN

## 2020-07-07 NOTE — PROGRESS NOTES
"Onel White is a 29 year old male who is being evaluated via a billable telephone visit.      The patient has been notified of following:     \"This telephone visit will be conducted via a call between you and your physician/provider. We have found that certain health care needs can be provided without the need for a physical exam.  This service lets us provide the care you need with a short phone conversation.  If a prescription is necessary we can send it directly to your pharmacy.  If lab work is needed we can place an order for that and you can then stop by our lab to have the test done at a later time.    Telephone visits are billed at different rates depending on your insurance coverage. During this emergency period, for some insurers they may be billed the same as an in-person visit.  Please reach out to your insurance provider with any questions.    If during the course of the call the physician/provider feels a telephone visit is not appropriate, you will not be charged for this service.\"    Patient has given verbal consent for Telephone visit?  Yes    What phone number would you like to be contacted at? [phone]    How would you like to obtain your AVS? Mikael Lynch     Onel White is a 29 year old male who presents via phone visit today for the following health issues:    HPI  Asthma Follow-Up    Was ACT completed today?  No      Do you have a cough?  No    Are you experiencing any wheezing in your chest?  YES    Do you have any shortness of breath?  YES     How often are you using a short-acting (rescue) inhaler or nebulizer, such as Albuterol?  20 puffs a day    How many days per week do you miss taking your asthma controller medication?  2    Please describe any recent triggers for your asthma: humidity    Have you had any Emergency Room Visits, Urgent Care Visits, or Hospital Admissions since your last office visit?  No      How many servings of fruits and vegetables do you eat daily?  " 0-1    On average, how many sweetened beverages do you drink each day (Examples: soda, juice, sweet tea, etc.  Do NOT count diet or artificially sweetened beverages)?   5-6    How many days per week do you exercise enough to make your heart beat faster? 3 or less    How many minutes a day do you exercise enough to make your heart beat faster? 60 or more  How many days per week do you miss taking your medication? 1    What makes it hard for you to take your medications?  remembering to take    Patient is a 29 year old male who calls in today with concerns about poor breathing. He says that he has been managing his symptoms with Breo ICS inhaler and albuterol as needed. He also has prednisone supply on hand, however it is unclear as to whether this is for the colitis or left over from previous breathing issues. He informs me that he has been taking 30-40mg for the past 6-7 days, using his breo and needing the albuterol 20 times. Patient did not sound as if he was in any distress on the phone, no difficulty speaking long sentences, no wheezing or cough heard during the visit.     Reviewed and updated as needed this visit by Provider         Review of Systems   Constitutional, HEENT, cardiovascular, pulmonary, GI, , musculoskeletal, neuro, skin, endocrine and psych systems are negative, except as otherwise noted.       Objective   Reported vitals:  There were no vitals taken for this visit.   healthy, alert and no distress  PSYCH: Alert and oriented times 3; coherent speech, normal   rate and volume, able to articulate logical thoughts, able   to abstract reason, no tangential thoughts, no hallucinations   or delusions  His affect is normal  RESP: No cough, no audible wheezing, able to talk in full sentences  Remainder of exam unable to be completed due to telephone visits    Diagnostic Test Results:  Labs reviewed in Epic        Assessment/Plan:  1. Moderate persistent asthma with acute exacerbation  Discussed with  patient increasing the dose of Breo during the yellow zone of AAP for 2-4 weeks, then resuming the lower dose following this. He will continue to practice good environmental controls and will work to avoid irritants/triggers. Reviewed risks of overuse of prednisone including adrenal suppression, weak bones, reduced immune function, weight gain, glaucoma and cataracts. Advised that he begin a taper for the next 9 days to slowly come off of the medication. Follow up advised in 3-4 months to re-evaluate breathing or sooner if not improving as expected.   - fluticasone-vilanterol (BREO ELLIPTA) 200-25 MCG/INH inhaler; Inhale 1 puff into the lungs daily  Dispense: 1 Inhaler; Refill: 0    2. Marijuana smoker  Discouraged smoking of any kind given the reactive nature of the patient's airways.   - albuterol (PROAIR HFA/PROVENTIL HFA/VENTOLIN HFA) 108 (90 Base) MCG/ACT inhaler; Inhale 1-2 puffs into the lungs every 4 hours as needed for shortness of breath / dyspnea or wheezing  Dispense: 18 g; Refill: 0    Return in about 4 months (around 11/7/2020) for Return for scheduled annual checkup with PCP.      Phone call duration:  12 minutes    Chun Herr PA-C

## 2020-07-07 NOTE — PATIENT INSTRUCTIONS
Stop the lower dose (100-25) Breo and begin higher dose (200-25) Breo 1 puff daily for next 2-4 weeks.     Taper off of prednisone 20mg x 3days, then 10mgx 3days, then 5mg (1/2 tab of 10mg) x 3 days. Then stop.     Follow up later this fall or early winter for annual checkup.       Patient Education     Understanding Asthma    Asthma is a long-term (chronic) lung condition. It involves the airways (bronchial tubes). It happens when a trigger causes your airways to swell and become narrow. The muscles around your airways start to tighten. When your airways start to narrow, air can't move in and out of your lungs very well. Mucus also builds up along the airways. This makes it even harder to move air in and out of your lungs.   Experts are not exactly sure what causes asthma. It may be caused by a mix of inherited and environmental factors. People with asthma may have no symptoms until they are exposed to an allergen or trigger.  Healthy lungs  Inside your lungs there are branching airways made of stretchy tissue. Each airway is wrapped with bands of muscle. The airways are smaller as they go deeper into the lungs. The smallest airways end in clusters of tiny balloon-like air sacs (alveoli). These clusters are surrounded by blood vessels. When you breathe in (inhale), air enters the lungs. It travels down through the airways until it reaches the air sacs. When you breathe out (exhale), air travels up through the airways and out of the lungs. The airways make mucus that traps particles you breathe in. Normally, the mucus is then swept out of the lungs by tiny hairs (cilia) that line the airways. The mucus is swallowed or coughed up during your day.  What the lungs do  The air you inhale contains oxygen. When oxygen reaches the air sacs, it passes into the blood vessels around the sacs. Your blood then sends oxygen to all of your cells. As you exhale, carbon dioxide is removed in a similar way from the blood in the air  sacs, and from your body.  When you have asthma  People with asthma have very sensitive airways. This means the airways react to certain things called triggers. Triggers can include pollen, dust, or smoke. Triggers cause inflammation. This makes the airways swell and become narrow. This is a long-lasting (chronic) problem. Your airways may not always be narrow enough so that you notice breathing problems.  Symptoms of chronic inflammation include:     Coughing (chronic)    A feeling of tightness in your chest    Feeling short of breath    Wheezing (a whistling noise, especially when breathing out)    Low energy or feeling tired  In some people, over time chronic mild inflammation can lead to lasting (permanent) scarring of airways and loss of lung function.  Asthma flare-ups  When sensitive airways are irritated by a trigger, the muscles around the airways tighten. The lining of the airways swells. Thick, sticky mucus increases and partly clogs the airways. All of this makes it harder to breathe.  Symptoms of flare-ups may include:    Coughing, especially at night. You may not be able to sleep because of coughing.    Getting tired or out of breath easily    Wheezing    Chest tightness    Faster breathing when at rest  Flare-ups can be life-threatening. In a severe flare-up, the muscle tightening, swelling, and mucus are worse. It s very hard to breathe. Your body can't get enough oxygen and can't remove carbon dioxide. Waste gas is trapped in the alveoli. Gas exchange can t occur. The body is not getting enough oxygen. Without oxygen, body tissues, especially brain tissue, begin to get damaged. If this goes on for long, it can lead to severe brain damage or death.  Call 911 (or have someone call for you) if you have any of these symptoms and they are not relieved right away by taking your quick-relief medicine as prescribed:    Trouble breathing    Feeling too short of breath to talk or walk    Lips or fingers  turning blue    Feeling lightheaded or dizzy, as though you are about to pass out    Peak flow less than 50% of your personal best, if you use a peak flow meter  Managing your asthma  Asthma is a long-term condition. So it s important to work with your healthcare provider to manage it. If you have asthma, you can prevent flare-ups. Develop an asthma action plan with your healthcare provider. It can help control your asthma and manage your symptoms. An asthma action plan also tells you and your family or friends what to do if your asthma flares up or gets worse.  Take your medicine as prescribed. Also learn about your asthma triggers. Knowing what causes your asthma to flare up in the first place can help you prevent future breathing problems.  If you smoke, get help to quit.  Date Last Reviewed: 3/1/2019    5498-2134 [employer]. [address] [place] [address], [place] [address]. All rights reserved. This information is not intended as a substitute for professional medical care. Always follow your healthcare professional's instructions.

## 2020-07-22 ENCOUNTER — ANCILLARY PROCEDURE (OUTPATIENT)
Dept: MRI IMAGING | Facility: CLINIC | Age: 30
End: 2020-07-22
Attending: INTERNAL MEDICINE
Payer: COMMERCIAL

## 2020-07-22 DIAGNOSIS — K51.011 ULCERATIVE PANCOLITIS WITH RECTAL BLEEDING (H): ICD-10-CM

## 2020-07-22 DIAGNOSIS — R79.89 ABNORMAL LFTS: ICD-10-CM

## 2020-07-22 PROCEDURE — 74183 MRI ABD W/O CNTR FLWD CNTR: CPT | Performed by: RADIOLOGY

## 2020-07-22 PROCEDURE — A9585 GADOBUTROL INJECTION: HCPCS | Performed by: RADIOLOGY

## 2020-07-22 RX ORDER — GADOBUTROL 604.72 MG/ML
10 INJECTION INTRAVENOUS ONCE
Status: COMPLETED | OUTPATIENT
Start: 2020-07-22 | End: 2020-07-22

## 2020-07-22 RX ADMIN — GADOBUTROL 9 ML: 604.72 INJECTION INTRAVENOUS at 15:47

## 2020-07-23 ENCOUNTER — TELEPHONE (OUTPATIENT)
Dept: GASTROENTEROLOGY | Facility: CLINIC | Age: 30
End: 2020-07-23

## 2020-07-23 DIAGNOSIS — R79.89 ABNORMAL LFTS: Primary | ICD-10-CM

## 2020-07-23 NOTE — TELEPHONE ENCOUNTER
Writer called patient and gave the patient  the results of the patient's MR , per Dr Hancock request. Patient heard and understood.  Patient also told writer that his vedolizumab medication has not yet been authorized by his insurance company.  Jayden Bradford MA

## 2020-07-25 ENCOUNTER — TELEPHONE (OUTPATIENT)
Dept: GASTROENTEROLOGY | Facility: CLINIC | Age: 30
End: 2020-07-25

## 2020-07-25 NOTE — TELEPHONE ENCOUNTER
Prior authorization started.    Yesi Floyd, RN  Gastroenterology Care Coordinator  [place], [place]

## 2020-07-25 NOTE — TELEPHONE ENCOUNTER
Prior Authorization Infusion/Clinic Administered Request    Location:   Diagnosis and ICD:  Drug/Therapy: Vedolizumab    Previously Tried and Failed Therapies:     Date of provider note with supporting information:    Urgency (When is the patient scheduled?):   Would you like to include any research articles?        If yes please call [phone] for further instructions about sending that information

## 2020-07-26 DIAGNOSIS — R79.89 ABNORMAL LFTS: ICD-10-CM

## 2020-07-26 DIAGNOSIS — J45.41 MODERATE PERSISTENT ASTHMA WITH ACUTE EXACERBATION: ICD-10-CM

## 2020-07-26 LAB
CRP SERPL-MCNC: <2.9 MG/L (ref 0–8)
FERRITIN SERPL-MCNC: 7 NG/ML (ref 26–388)
IRON SERPL-MCNC: 22 UG/DL (ref 35–180)
TIBC SERPL-MCNC: 436 UG/DL (ref 240–430)

## 2020-07-26 PROCEDURE — 86038 ANTINUCLEAR ANTIBODIES: CPT | Performed by: INTERNAL MEDICINE

## 2020-07-26 PROCEDURE — 82728 ASSAY OF FERRITIN: CPT | Performed by: INTERNAL MEDICINE

## 2020-07-26 PROCEDURE — 82390 ASSAY OF CERULOPLASMIN: CPT | Performed by: INTERNAL MEDICINE

## 2020-07-26 PROCEDURE — 83516 IMMUNOASSAY NONANTIBODY: CPT | Mod: 90 | Performed by: INTERNAL MEDICINE

## 2020-07-26 PROCEDURE — 82103 ALPHA-1-ANTITRYPSIN TOTAL: CPT | Mod: 90 | Performed by: INTERNAL MEDICINE

## 2020-07-26 PROCEDURE — 80076 HEPATIC FUNCTION PANEL: CPT | Performed by: INTERNAL MEDICINE

## 2020-07-26 PROCEDURE — 86803 HEPATITIS C AB TEST: CPT | Performed by: INTERNAL MEDICINE

## 2020-07-26 PROCEDURE — 86140 C-REACTIVE PROTEIN: CPT | Performed by: INTERNAL MEDICINE

## 2020-07-26 PROCEDURE — 84466 ASSAY OF TRANSFERRIN: CPT | Performed by: INTERNAL MEDICINE

## 2020-07-26 PROCEDURE — 82784 ASSAY IGA/IGD/IGG/IGM EACH: CPT | Performed by: INTERNAL MEDICINE

## 2020-07-26 PROCEDURE — 99000 SPECIMEN HANDLING OFFICE-LAB: CPT | Performed by: INTERNAL MEDICINE

## 2020-07-26 PROCEDURE — 81332 SERPINA1 GENE: CPT | Performed by: INTERNAL MEDICINE

## 2020-07-26 PROCEDURE — 83540 ASSAY OF IRON: CPT | Performed by: INTERNAL MEDICINE

## 2020-07-26 PROCEDURE — 36415 COLL VENOUS BLD VENIPUNCTURE: CPT | Performed by: INTERNAL MEDICINE

## 2020-07-26 NOTE — LETTER
[address] [address]  Phone: [phone]  Fax: [phone]        July 30, 2020      Onel White                                                                                                                                [id number] [place] [id number]            Dear Mr. White,    We are concerned about your health care.  We recently provided you with a medication refill.  Many medications require routine follow-up with your Doctor.      At this time we ask that: You schedule a routine office visit with your physician to follow your care. Please contact the clinic to have a virtual or face to face visit. As at the time of your last visit earlier this month you were already taking prednisone 30-40mg/day. If another flare up is occurring the re-evaluating as a change to your inhalers could be needed.       Your prescription: No further refills will be given until your follow up care is completed.      Thank you,      Chun Herr PA-C

## 2020-07-27 DIAGNOSIS — R79.89 ABNORMAL LFTS: Primary | ICD-10-CM

## 2020-07-27 DIAGNOSIS — J20.9 ACUTE BRONCHITIS WITH SYMPTOMS > 10 DAYS: Primary | ICD-10-CM

## 2020-07-27 LAB
ALBUMIN SERPL-MCNC: 3.8 G/DL (ref 3.4–5)
ALP SERPL-CCNC: 243 U/L (ref 40–150)
ALT SERPL W P-5'-P-CCNC: 128 U/L (ref 0–70)
ANA SER QL IF: NEGATIVE
AST SERPL W P-5'-P-CCNC: 68 U/L (ref 0–45)
BILIRUB DIRECT SERPL-MCNC: 0.1 MG/DL (ref 0–0.2)
BILIRUB SERPL-MCNC: 0.3 MG/DL (ref 0.2–1.3)
CERULOPLASMIN SERPL-MCNC: 32 MG/DL (ref 20–60)
HCV AB SERPL QL IA: NONREACTIVE
IGG SERPL-MCNC: 1372 MG/DL (ref 610–1616)
MITOCHONDRIA M2 IGG SER-ACNC: 1 U/ML
PROT SERPL-MCNC: 7.8 G/DL (ref 6.8–8.8)
TRANSFERRIN SERPL-MCNC: 344 MG/DL (ref 210–360)

## 2020-07-27 NOTE — TELEPHONE ENCOUNTER
Spoke with patient and he states that he had a telephone visit on 7/7/2020 with Chun in regards to his asthma. Needing to have prednisone on hand incase of a flare up. Routing to provider to advise. Does he still need to have a new telephone visit?     Ashtyn Barahona MA

## 2020-07-27 NOTE — TELEPHONE ENCOUNTER
Routing refill request to provider for review/approval because:  Drug not on the [facility] refill protocol     KERI Vazquez, RN  [facility]

## 2020-07-27 NOTE — TELEPHONE ENCOUNTER
Prednisone        Last Written Prescription Date:  3/17/2020  Last Fill Quantity: 30,   # refills: 0  Last Office Visit: 7/7/2020  Future Office visit:       Routing refill request to provider for review/approval because:  Drug not on the [facility], P or [facility] refill protocol or controlled substance

## 2020-07-27 NOTE — TELEPHONE ENCOUNTER
Please call patient to ask that he schedule a virtual visit to evaluate need for prednisone.     Chun Herr PA-C on 7/27/2020 at 8:59 AM

## 2020-07-27 NOTE — TELEPHONE ENCOUNTER
Yes I would like him to have a virtual visit or face to face (I think [facility] has openings). As at the time of my last visit with him earlier this month he was already taking prednisone 30-40mg/day. If another flare up is occurring then re-evaluating as a change to his inhalers may be necessary.     Chun Herr PA-C on 7/27/2020 at 9:28 AM

## 2020-07-28 LAB — SMA IGG SER-ACNC: 7 UNITS (ref 0–19)

## 2020-07-28 RX ORDER — PREDNISONE 10 MG/1
TABLET ORAL
Qty: 30 TABLET | Refills: 0 | Status: SHIPPED | OUTPATIENT
Start: 2020-07-28 | End: 2020-09-21

## 2020-07-28 NOTE — TELEPHONE ENCOUNTER
Called and LM for patient to call back. Please relay below and help set up appointment.   Ashtny Barahona MA

## 2020-07-28 NOTE — TELEPHONE ENCOUNTER
Please refuse medication.  We are unable to route this message to the pool because this medication is on there.

## 2020-07-30 DIAGNOSIS — K51.011 ULCERATIVE PANCOLITIS WITH RECTAL BLEEDING (H): ICD-10-CM

## 2020-07-30 DIAGNOSIS — R79.89 ABNORMAL LFTS: Primary | ICD-10-CM

## 2020-07-30 RX ORDER — PREDNISONE 20 MG/1
TABLET ORAL
Qty: 10 TABLET | Refills: 1 | OUTPATIENT
Start: 2020-07-30

## 2020-07-30 NOTE — TELEPHONE ENCOUNTER
We have been unable to reach patient, Letter sent to call the clinic to set up an appointment.   Ashtyn Barahona MA

## 2020-07-30 NOTE — TELEPHONE ENCOUNTER
I would like him to have a virtual visit or face to face. As at the time of my last visit with him earlier this month he was already taking prednisone 30-40mg/day. If another flare up is occurring then re-evaluating as a change to his inhalers may be necessary.      Chun Herr PA-C on 7/27/2020 at 9:28 AM

## 2020-08-05 ENCOUNTER — TELEPHONE (OUTPATIENT)
Dept: PEDIATRICS | Facility: CLINIC | Age: 30
End: 2020-08-05

## 2020-08-05 DIAGNOSIS — F43.23 ADJUSTMENT DISORDER WITH MIXED ANXIETY AND DEPRESSED MOOD: ICD-10-CM

## 2020-08-05 LAB
A1AT PHENOTYP SERPL-IMP: NORMAL
A1AT SERPL-MCNC: 140 MG/DL (ref 90–200)
A1AT SS SERPL-MCNC: NEGATIVE G/L
A1AT SZ SERPL-MCNC: NORMAL G/L
A1AT ZZ SERPL-MCNC: NEGATIVE G/L
SPECIMEN SOURCE: NORMAL

## 2020-08-05 NOTE — LETTER
August 14, 2020      Onel White  [address]  [facility] [address]      Dear Onel,    We recently received a call from your pharmacy requesting a refill of your medication.    A review of your chart indicates that an appointment is required with your provider.  Please call the clinic to schedule your appointment.    We have authorized one refill of your medication to allow time for you to schedule.   If you have a history of diabetes or high cholesterol, please come in fasting for the appointment. Fasting entails nothing to eat or drink 8 hours prior to your appointment; with the exception on water. You may take your medication the day of the appointment.    Thank you,      Heena Cohen CNM

## 2020-08-10 RX ORDER — ESCITALOPRAM OXALATE 10 MG/1
10 TABLET ORAL DAILY
Qty: 30 TABLET | Refills: 0 | Status: SHIPPED | OUTPATIENT
Start: 2020-08-10 | End: 2020-11-09

## 2020-08-10 NOTE — TELEPHONE ENCOUNTER
escitalopram (LEXAPRO) 10 MG tablet     Last Written Prescription Date:  5-7-2020  Last Fill Quantity: 30,   # refills: 1  Last Office Visit : 5/7/2020  Future Office visit:  None  Scheduling has been notified to contact the pt for appointment.      Routing refill request to provider for review/approval because:  Overdue for RTC in 1 month - no scheduled appointment.      Kathleen M Doege RN

## 2020-08-10 NOTE — TELEPHONE ENCOUNTER
PHQ-9 SCORE 5/7/2020   PHQ-9 Total Score 16       JASON-7 SCORE 5/7/2020   Total Score 17       [facility] Follow-up to PHQ 5/7/2020   PHQ-9 9. Suicide Ideation past 2 weeks Not at all         One refill given needs follow up appointment

## 2020-08-11 NOTE — TELEPHONE ENCOUNTER
PA approved, call patient and warm transfer to infusion scheduling at *[phone], option 2.    Yesi Floyd RN  Gastroenterology Care Coordinator  [place], [place]

## 2020-08-12 NOTE — TELEPHONE ENCOUNTER
LPN left detailed voicemail for patient notifying him that prior authorization for Vedolizumab was approved and that he should call the infusion center at [phone] to schedule infusion appointment.    Yasmin Salgado LPN

## 2020-08-13 NOTE — TELEPHONE ENCOUNTER
Patient read [employer] message and had not scheduled infusion at this time.     Yasmin Salgado LPN

## 2020-08-14 NOTE — TELEPHONE ENCOUNTER
Patient scheduled for Entyvio infusion 8/31.    Yesi Floyd RN  Gastroenterology Care Coordinator  [place], [place]

## 2020-08-14 NOTE — TELEPHONE ENCOUNTER
Spoke to patient. Attempted to Warm transfer to infusion for scheduling. Due to hold times, patient was placed on a virtual hold for call back. Will keep encounter open until patient is scheduled.    Yesi Floyd RN  Gastroenterology Care Coordinator  [place], [place]

## 2020-08-31 ENCOUNTER — INFUSION THERAPY VISIT (OUTPATIENT)
Dept: INFUSION THERAPY | Facility: CLINIC | Age: 30
End: 2020-08-31
Payer: COMMERCIAL

## 2020-08-31 VITALS
WEIGHT: 214.3 LBS | HEART RATE: 73 BPM | OXYGEN SATURATION: 100 % | SYSTOLIC BLOOD PRESSURE: 138 MMHG | DIASTOLIC BLOOD PRESSURE: 88 MMHG | TEMPERATURE: 98.1 F | BODY MASS INDEX: 26.79 KG/M2 | RESPIRATION RATE: 18 BRPM

## 2020-08-31 DIAGNOSIS — K51.011 ULCERATIVE PANCOLITIS WITH RECTAL BLEEDING (H): Primary | ICD-10-CM

## 2020-08-31 DIAGNOSIS — R79.89 ABNORMAL LFTS: ICD-10-CM

## 2020-08-31 LAB
ALBUMIN SERPL-MCNC: 4.3 G/DL (ref 3.4–5)
ALP SERPL-CCNC: 328 U/L (ref 40–150)
ALT SERPL W P-5'-P-CCNC: 143 U/L (ref 0–70)
ANION GAP SERPL CALCULATED.3IONS-SCNC: 7 MMOL/L (ref 3–14)
AST SERPL W P-5'-P-CCNC: 62 U/L (ref 0–45)
BASOPHILS # BLD AUTO: 0.1 10E9/L (ref 0–0.2)
BASOPHILS NFR BLD AUTO: 0.8 %
BILIRUB DIRECT SERPL-MCNC: 0.2 MG/DL (ref 0–0.2)
BILIRUB SERPL-MCNC: 0.7 MG/DL (ref 0.2–1.3)
BUN SERPL-MCNC: 10 MG/DL (ref 7–30)
CALCIUM SERPL-MCNC: 9 MG/DL (ref 8.5–10.1)
CHLORIDE SERPL-SCNC: 103 MMOL/L (ref 94–109)
CO2 SERPL-SCNC: 28 MMOL/L (ref 20–32)
CREAT SERPL-MCNC: 0.91 MG/DL (ref 0.66–1.25)
CRP SERPL-MCNC: <2.9 MG/L (ref 0–8)
DIFFERENTIAL METHOD BLD: NORMAL
EOSINOPHIL # BLD AUTO: 0.7 10E9/L (ref 0–0.7)
EOSINOPHIL NFR BLD AUTO: 10 %
ERYTHROCYTE [DISTWIDTH] IN BLOOD BY AUTOMATED COUNT: 14 % (ref 10–15)
ERYTHROCYTE [SEDIMENTATION RATE] IN BLOOD BY WESTERGREN METHOD: 6 MM/H (ref 0–15)
FERRITIN SERPL-MCNC: 13 NG/ML (ref 26–388)
GFR SERPL CREATININE-BSD FRML MDRD: >90 ML/MIN/{1.73_M2}
GLUCOSE SERPL-MCNC: 70 MG/DL (ref 70–99)
HCT VFR BLD AUTO: 47.9 % (ref 40–53)
HGB BLD-MCNC: 15.2 G/DL (ref 13.3–17.7)
IMM GRANULOCYTES # BLD: 0 10E9/L (ref 0–0.4)
IMM GRANULOCYTES NFR BLD: 0.4 %
LYMPHOCYTES # BLD AUTO: 1.9 10E9/L (ref 0.8–5.3)
LYMPHOCYTES NFR BLD AUTO: 25.9 %
MCH RBC QN AUTO: 28 PG (ref 26.5–33)
MCHC RBC AUTO-ENTMCNC: 31.7 G/DL (ref 31.5–36.5)
MCV RBC AUTO: 88 FL (ref 78–100)
MONOCYTES # BLD AUTO: 0.7 10E9/L (ref 0–1.3)
MONOCYTES NFR BLD AUTO: 9.2 %
NEUTROPHILS # BLD AUTO: 3.9 10E9/L (ref 1.6–8.3)
NEUTROPHILS NFR BLD AUTO: 53.7 %
PLATELET # BLD AUTO: 265 10E9/L (ref 150–450)
POTASSIUM SERPL-SCNC: 3.7 MMOL/L (ref 3.4–5.3)
PROT SERPL-MCNC: 8.7 G/DL (ref 6.8–8.8)
RBC # BLD AUTO: 5.43 10E12/L (ref 4.4–5.9)
SODIUM SERPL-SCNC: 138 MMOL/L (ref 133–144)
TRANSFERRIN SERPL-MCNC: 411 MG/DL (ref 210–360)
WBC # BLD AUTO: 7.3 10E9/L (ref 4–11)

## 2020-08-31 PROCEDURE — 82728 ASSAY OF FERRITIN: CPT | Performed by: INTERNAL MEDICINE

## 2020-08-31 PROCEDURE — 81332 SERPINA1 GENE: CPT | Performed by: INTERNAL MEDICINE

## 2020-08-31 PROCEDURE — 84466 ASSAY OF TRANSFERRIN: CPT | Performed by: INTERNAL MEDICINE

## 2020-08-31 PROCEDURE — 82784 ASSAY IGA/IGD/IGG/IGM EACH: CPT | Performed by: INTERNAL MEDICINE

## 2020-08-31 PROCEDURE — 99207 ZZC NO CHARGE NURSE ONLY: CPT

## 2020-08-31 PROCEDURE — 80048 BASIC METABOLIC PNL TOTAL CA: CPT | Performed by: INTERNAL MEDICINE

## 2020-08-31 PROCEDURE — 82103 ALPHA-1-ANTITRYPSIN TOTAL: CPT | Mod: 90 | Performed by: INTERNAL MEDICINE

## 2020-08-31 PROCEDURE — 85652 RBC SED RATE AUTOMATED: CPT | Performed by: INTERNAL MEDICINE

## 2020-08-31 PROCEDURE — 86140 C-REACTIVE PROTEIN: CPT | Performed by: INTERNAL MEDICINE

## 2020-08-31 PROCEDURE — 36415 COLL VENOUS BLD VENIPUNCTURE: CPT | Performed by: INTERNAL MEDICINE

## 2020-08-31 PROCEDURE — 80076 HEPATIC FUNCTION PANEL: CPT | Performed by: INTERNAL MEDICINE

## 2020-08-31 PROCEDURE — 86038 ANTINUCLEAR ANTIBODIES: CPT | Performed by: INTERNAL MEDICINE

## 2020-08-31 PROCEDURE — 99000 SPECIMEN HANDLING OFFICE-LAB: CPT | Performed by: INTERNAL MEDICINE

## 2020-08-31 PROCEDURE — 85025 COMPLETE CBC W/AUTO DIFF WBC: CPT | Performed by: INTERNAL MEDICINE

## 2020-08-31 PROCEDURE — 96413 CHEMO IV INFUSION 1 HR: CPT | Performed by: NURSE PRACTITIONER

## 2020-08-31 PROCEDURE — 83516 IMMUNOASSAY NONANTIBODY: CPT | Mod: 90 | Performed by: INTERNAL MEDICINE

## 2020-08-31 RX ORDER — HEPARIN SODIUM (PORCINE) LOCK FLUSH IV SOLN 100 UNIT/ML 100 UNIT/ML
5 SOLUTION INTRAVENOUS
Status: CANCELLED | OUTPATIENT
Start: 2020-09-14

## 2020-08-31 RX ORDER — HEPARIN SODIUM,PORCINE 10 UNIT/ML
5 VIAL (ML) INTRAVENOUS
Status: CANCELLED | OUTPATIENT
Start: 2020-09-14

## 2020-08-31 RX ADMIN — Medication 250 ML: at 16:38

## 2020-08-31 ASSESSMENT — PAIN SCALES - GENERAL: PAINLEVEL: NO PAIN (0)

## 2020-08-31 NOTE — PROGRESS NOTES
Infusion Nursing Note:  Onel White presents today for New Entyvio.    Patient seen by provider today: No   present during visit today: Not Applicable.    Note:Pt oriented to the [facility] infusion center, plan of care, possible side effects and when to call the infusion RN staff for assistance or possible reaction. Pt verbalized understanding - will treat as ordered.    Intravenous Access:  Peripheral IV placed.    Treatment Conditions:  Biological Infusion Checklist:  ~~~ NOTE: If the patient answers yes to any of the questions below, hold the infusion and contact ordering provider or on-call provider.    1. Have you recently had an elevated temperature, fever, chills, productive cough, coughing for 3 weeks or longer or hemoptysis, abnormal vital signs, night sweats,  chest pain or have you noticed a decrease in your appetite, unexplained weight loss or fatigue? No  2. Do you have any open wounds or new incisions? No  3. Do you have any recent or upcoming hospitalizations, surgeries or dental procedures? No  4. Do you currently have or recently have had any signs of illness or infection or are you on any antibiotics? No  5. Have you had any new, sudden or worsening abdominal pain? No  6. Have you or anyone in your household received a live vaccination in the past 4 weeks? Please note:  No live vaccines while on biologic/chemotherapy until 6 months after the last treatment.  Patient can receive the flu vaccine (shot only) and the pneumovax.  It is optimal for the patient to get these vaccines mid cycle, but they can be given at any time as long as it is not on the day of the infusion. No  7. Have you recently been diagnosed with any new nervous system diseases (ie. Multiple sclerosis, Guillain [place]ian, seizures, neurological changes) or cancer diagnosis? No  8. Are you on any form of radiation or chemotherapy? No  9. Are you pregnant or breast feeding or do you have plans of pregnancy in the future?  No  10. Have you been having any signs of worsening depression or suicidal ideations?  (benlysta only) No  11. Have there been any other new onset medical symptoms? No    Post Infusion Assessment:  Patient tolerated infusion without incident.  Blood return noted pre and post infusion.  Site patent and intact, free from redness, edema or discomfort.  No evidence of extravasations.  Access discontinued per protocol.  Biologic Infusion Post Education: Call the triage nurse at your clinic or seek medical attention if you have chills and/or temperature greater than or equal to 100.5, uncontrolled nausea/vomiting, diarrhea, constipation, dizziness, shortness of breath, chest pain, heart palpitations, weakness or any other new or concerning symptoms, questions or concerns.  You cannot have any live virus vaccines prior to or during treatment or up to 6 months post infusion.  If you have an upcoming surgery, medical procedure or dental procedure during treatment, this should be discussed with your ordering physician and your surgeon/dentist.  If you are having any concerning symptom, if you are unsure if you should get your next infusion or wish to speak to a provider before your next infusion, please call your care coordinator or triage nurse at your clinic to notify them so we can adequately serve you.       Discharge Plan:   Discharge instructions reviewed with: Patient.  Patient and/or family verbalized understanding of discharge instructions and all questions answered.  AVS to patient via "[email or web address]T.  Patient will return in 2 weeks for next appointment. Instructed to make more appointments.  Patient discharged in stable condition accompanied by: self.  Departure Mode: Ambulatory.    Katiana Husain RN

## 2020-09-01 LAB
ANA SER QL IF: NEGATIVE
IGG SERPL-MCNC: 1461 MG/DL (ref 610–1616)

## 2020-09-02 LAB — SMA IGG SER-ACNC: 8 UNITS (ref 0–19)

## 2020-09-08 LAB
A1AT PHENOTYP SERPL-IMP: NORMAL
A1AT SERPL-MCNC: 144 MG/DL (ref 90–200)
A1AT SS SERPL-MCNC: NEGATIVE G/L
A1AT SZ SERPL-MCNC: NORMAL G/L
A1AT ZZ SERPL-MCNC: NEGATIVE G/L
SPECIMEN SOURCE: NORMAL

## 2020-09-15 ENCOUNTER — INFUSION THERAPY VISIT (OUTPATIENT)
Dept: INFUSION THERAPY | Facility: CLINIC | Age: 30
End: 2020-09-15
Payer: COMMERCIAL

## 2020-09-15 VITALS
HEART RATE: 79 BPM | OXYGEN SATURATION: 100 % | RESPIRATION RATE: 18 BRPM | DIASTOLIC BLOOD PRESSURE: 77 MMHG | BODY MASS INDEX: 27.79 KG/M2 | TEMPERATURE: 97.8 F | SYSTOLIC BLOOD PRESSURE: 121 MMHG | WEIGHT: 222.3 LBS

## 2020-09-15 DIAGNOSIS — K51.011 ULCERATIVE PANCOLITIS WITH RECTAL BLEEDING (H): Primary | ICD-10-CM

## 2020-09-15 LAB
ALBUMIN SERPL-MCNC: 4.1 G/DL (ref 3.4–5)
ALP SERPL-CCNC: 254 U/L (ref 40–150)
ALT SERPL W P-5'-P-CCNC: 85 U/L (ref 0–70)
AST SERPL W P-5'-P-CCNC: ABNORMAL U/L (ref 0–45)
BASOPHILS # BLD AUTO: 0.1 10E9/L (ref 0–0.2)
BASOPHILS NFR BLD AUTO: 0.8 %
BILIRUB DIRECT SERPL-MCNC: 0.1 MG/DL (ref 0–0.2)
BILIRUB SERPL-MCNC: 0.6 MG/DL (ref 0.2–1.3)
CRP SERPL-MCNC: <2.9 MG/L (ref 0–8)
DIFFERENTIAL METHOD BLD: ABNORMAL
EOSINOPHIL # BLD AUTO: 0.8 10E9/L (ref 0–0.7)
EOSINOPHIL NFR BLD AUTO: 9.9 %
ERYTHROCYTE [DISTWIDTH] IN BLOOD BY AUTOMATED COUNT: 14 % (ref 10–15)
ERYTHROCYTE [SEDIMENTATION RATE] IN BLOOD BY WESTERGREN METHOD: 5 MM/H (ref 0–15)
HCT VFR BLD AUTO: 46.6 % (ref 40–53)
HGB BLD-MCNC: 14.8 G/DL (ref 13.3–17.7)
IMM GRANULOCYTES # BLD: 0 10E9/L (ref 0–0.4)
IMM GRANULOCYTES NFR BLD: 0.3 %
LYMPHOCYTES # BLD AUTO: 1.9 10E9/L (ref 0.8–5.3)
LYMPHOCYTES NFR BLD AUTO: 24.8 %
MCH RBC QN AUTO: 27.6 PG (ref 26.5–33)
MCHC RBC AUTO-ENTMCNC: 31.8 G/DL (ref 31.5–36.5)
MCV RBC AUTO: 87 FL (ref 78–100)
MONOCYTES # BLD AUTO: 0.6 10E9/L (ref 0–1.3)
MONOCYTES NFR BLD AUTO: 8.1 %
NEUTROPHILS # BLD AUTO: 4.3 10E9/L (ref 1.6–8.3)
NEUTROPHILS NFR BLD AUTO: 56.1 %
PLATELET # BLD AUTO: 268 10E9/L (ref 150–450)
PROT SERPL-MCNC: 8.3 G/DL (ref 6.8–8.8)
RBC # BLD AUTO: 5.36 10E12/L (ref 4.4–5.9)
WBC # BLD AUTO: 7.7 10E9/L (ref 4–11)

## 2020-09-15 PROCEDURE — 85025 COMPLETE CBC W/AUTO DIFF WBC: CPT | Performed by: INTERNAL MEDICINE

## 2020-09-15 PROCEDURE — 86140 C-REACTIVE PROTEIN: CPT | Performed by: INTERNAL MEDICINE

## 2020-09-15 PROCEDURE — 99207 ZZC NO CHARGE NURSE ONLY: CPT

## 2020-09-15 PROCEDURE — 85652 RBC SED RATE AUTOMATED: CPT | Performed by: INTERNAL MEDICINE

## 2020-09-15 PROCEDURE — 96413 CHEMO IV INFUSION 1 HR: CPT | Performed by: NURSE PRACTITIONER

## 2020-09-15 PROCEDURE — 36415 COLL VENOUS BLD VENIPUNCTURE: CPT | Performed by: INTERNAL MEDICINE

## 2020-09-15 PROCEDURE — 80076 HEPATIC FUNCTION PANEL: CPT | Performed by: INTERNAL MEDICINE

## 2020-09-15 RX ORDER — HEPARIN SODIUM (PORCINE) LOCK FLUSH IV SOLN 100 UNIT/ML 100 UNIT/ML
5 SOLUTION INTRAVENOUS
Status: CANCELLED | OUTPATIENT
Start: 2020-09-28

## 2020-09-15 RX ORDER — HEPARIN SODIUM,PORCINE 10 UNIT/ML
5 VIAL (ML) INTRAVENOUS
Status: CANCELLED | OUTPATIENT
Start: 2020-09-28

## 2020-09-15 RX ADMIN — Medication 250 ML: at 16:41

## 2020-09-15 ASSESSMENT — PAIN SCALES - GENERAL: PAINLEVEL: NO PAIN (0)

## 2020-09-15 NOTE — PROGRESS NOTES
"Infusion Nursing Note:  Onel White presents today for Dose #2 of Entyvio.   Patient seen by provider today: No   present during visit today: Not Applicable.    Note: Pt is anxious today - has had anxiety related to his IV starts,  Will draw labs today with IV insertion.      Intravenous Access:  Lab draw site Right Hand, Needle type 3/4 inch, Gauge 24.  Labs drawn without difficulty.  Peripheral IV placed - Pt states he can feel the coolness of the saline flush but denied any pain, left hot pack on site to help dialate vein, \"It just feels weird\". Good blood return, easy flush will keep for IV infusion.    Treatment Conditions:  Biological Infusion Checklist:  ~~~ NOTE: If the patient answers yes to any of the questions below, hold the infusion and contact ordering provider or on-call provider.    1. Have you recently had an elevated temperature, fever, chills, productive cough, coughing for 3 weeks or longer or hemoptysis, abnormal vital signs, night sweats,  chest pain or have you noticed a decrease in your appetite, unexplained weight loss or fatigue? No  2. Do you have any open wounds or new incisions? No  3. Do you have any recent or upcoming hospitalizations, surgeries or dental procedures? No  4. Do you currently have or recently have had any signs of illness or infection or are you on any antibiotics? No  5. Have you had any new, sudden or worsening abdominal pain? No  6. Have you or anyone in your household received a live vaccination in the past 4 weeks? Please note:  No live vaccines while on biologic/chemotherapy until 6 months after the last treatment.  Patient can receive the flu vaccine (shot only) and the pneumovax.  It is optimal for the patient to get these vaccines mid cycle, but they can be given at any time as long as it is not on the day of the infusion. No  7. Have you recently been diagnosed with any new nervous system diseases (ie. Multiple sclerosis, Guillain Farnham, seizures, " "neurological changes) or cancer diagnosis? No  8. Are you on any form of radiation or chemotherapy? No  9. Are you pregnant or breast feeding or do you have plans of pregnancy in the future? No  10. Have you been having any signs of worsening depression or suicidal ideations?  (benlysta only) No  11. Have there been any other new onset medical symptoms? No      Post Infusion Assessment:  Pt encouraged to hydrate as much as he can with water today and before his next appointments..  Patient tolerated infusion, but had an episode of nausea, complained that he feels weird, skin felt clammy to the touch, pt appeared pale.  VSS, NS given, pt with his feet up and cold packs to his arms and neck.  Denied that he was in any distress,  After approximately 5 minutes pt started to feel better - \"I am back to normal\".  Restarted Entyvio with pt lying on his back - Pt admits he thinks the \"IV was too much with the hot pack\".  History of traumatic event when he was hospitalized as a 15 year old.  Anxiety with labs draws and IV's has been an issue for years.    Blood return noted pre and post infusion.  Site patent and intact, free from redness, edema or discomfort.  No evidence of extravasations.  Access discontinued per protocol.  Biologic Infusion Post Education: Call the triage nurse at your clinic or seek medical attention if you have chills and/or temperature greater than or equal to 100.5, uncontrolled nausea/vomiting, diarrhea, constipation, dizziness, shortness of breath, chest pain, heart palpitations, weakness or any other new or concerning symptoms, questions or concerns.  You cannot have any live virus vaccines prior to or during treatment or up to 6 months post infusion.  If you have an upcoming surgery, medical procedure or dental procedure during treatment, this should be discussed with your ordering physician and your surgeon/dentist.  If you are having any concerning symptom, if you are unsure if you should get " your next infusion or wish to speak to a provider before your next infusion, please call your care coordinator or triage nurse at your clinic to notify them so we can adequately serve you.       Discharge Plan:   Return in 4 week for Entyvio infusion then every 8 weeks.  Discharge instructions reviewed with: Patient.  Patient and/or family verbalized understanding of discharge instructions and all questions answered.  Patient discharged in stable condition accompanied by: self.  Departure Mode: Ambulatory.    Katiana Husain RN

## 2020-09-18 ENCOUNTER — TELEPHONE (OUTPATIENT)
Dept: GASTROENTEROLOGY | Facility: CLINIC | Age: 30
End: 2020-09-18

## 2020-09-18 DIAGNOSIS — J20.9 ACUTE BRONCHITIS WITH SYMPTOMS > 10 DAYS: ICD-10-CM

## 2020-09-18 DIAGNOSIS — J45.41 MODERATE PERSISTENT ASTHMA WITH ACUTE EXACERBATION: ICD-10-CM

## 2020-09-18 DIAGNOSIS — K51.011 ULCERATIVE PANCOLITIS WITH RECTAL BLEEDING (H): Primary | ICD-10-CM

## 2020-09-18 NOTE — TELEPHONE ENCOUNTER
Per Dr. Hancock:    His liver tests are improving.  Can we make sure we recheck again in 1 month and again with the next infusion.  I also sent him a note to avoid alcohol and tylenol.  He should also let us know if he ends up getting any more prednisone for his asthma as this can also sometimes cause abnormal liver tests.       Spoke to pt and advised as above.  Pt will have next infusion in a month. Will place road map order to ensure labs get done. Pt has no further questions.    Yesi Floyd RN  Gastroenterology Care Coordinator  [place], [place]

## 2020-09-18 NOTE — TELEPHONE ENCOUNTER
Requested Prescriptions   Pending Prescriptions Disp Refills     predniSONE (DELTASONE) 10 MG tablet 30 tablet 0     Si tabs daily for 3 days, then 3 tabs daily for 3 days, then 2 tabs daily for 3 days, then 1 tab daily for 3 days, then stop     Last Written Prescription Date:  2020  Last Fill Quantity: 30,   # refills: 0  Last Office Visit: 2020  Future Office visit:       Routing refill request to provider for review/approval because:  Drug not on the [facility], P or [facility] refill protocol or controlled substance    Ashtyn Barahona MA

## 2020-09-18 NOTE — TELEPHONE ENCOUNTER
Routing refill request to provider for review/approval because:  ACT <20 and overdue    ANDERSON VazquezN, RN  [facility]

## 2020-09-20 RX ORDER — PREDNISONE 10 MG/1
TABLET ORAL
Qty: 30 TABLET | Refills: 0 | OUTPATIENT
Start: 2020-09-20

## 2020-09-20 NOTE — TELEPHONE ENCOUNTER
Patient is would need an office visit for this request. I have not evaluated the patient since July (almost 3 months ago).     Chun Herr PA-C on 9/20/2020 at 8:59 AM

## 2020-09-21 DIAGNOSIS — J20.9 ACUTE BRONCHITIS WITH SYMPTOMS > 10 DAYS: ICD-10-CM

## 2020-09-21 RX ORDER — PREDNISONE 10 MG/1
TABLET ORAL
Qty: 30 TABLET | Refills: 0 | Status: SHIPPED | OUTPATIENT
Start: 2020-09-21 | End: 2020-12-07

## 2020-09-21 NOTE — TELEPHONE ENCOUNTER
predniSONE (DELTASONE) 10 MG tablet    Last Written Prescription Date:  09/21/2020  Last Fill Quantity: 30,   # refills: 0  Last Office Visit: 09/27/2018  Future Office visit:       Routing refill request to provider for review/approval because:  Drug not on the [facility], [facility] or [facility] refill protocol or controlled substance

## 2020-09-28 ENCOUNTER — VIRTUAL VISIT (OUTPATIENT)
Dept: FAMILY MEDICINE | Facility: CLINIC | Age: 30
End: 2020-09-28
Payer: COMMERCIAL

## 2020-09-28 DIAGNOSIS — J45.41 MODERATE PERSISTENT ASTHMA WITH ACUTE EXACERBATION: Primary | ICD-10-CM

## 2020-09-28 DIAGNOSIS — F32.A DEPRESSION, UNSPECIFIED DEPRESSION TYPE: ICD-10-CM

## 2020-09-28 DIAGNOSIS — F12.90 MARIJUANA SMOKER: ICD-10-CM

## 2020-09-28 PROCEDURE — 99214 OFFICE O/P EST MOD 30 MIN: CPT | Mod: 95 | Performed by: PHYSICIAN ASSISTANT

## 2020-09-28 RX ORDER — ALBUTEROL SULFATE 0.83 MG/ML
2.5 SOLUTION RESPIRATORY (INHALATION) 3 TIMES DAILY PRN
Qty: 180 ML | Refills: 11 | Status: SHIPPED | OUTPATIENT
Start: 2020-09-28 | End: 2021-05-26

## 2020-09-28 RX ORDER — ALBUTEROL SULFATE 90 UG/1
1-2 AEROSOL, METERED RESPIRATORY (INHALATION) EVERY 4 HOURS PRN
Qty: 18 G | Refills: 0 | Status: SHIPPED | OUTPATIENT
Start: 2020-09-28 | End: 2021-02-17

## 2020-09-28 NOTE — NURSING NOTE
Health Maintenance Due   Topic Date Due     PREVENTIVE CARE VISIT  08/09/2007     ASTHMA ACTION PLAN  12/20/2018     ASTHMA CONTROL TEST  03/27/2019     DTAP/TDAP/TD IMMUNIZATION (2 - Td) 08/23/2020     INFLUENZA VACCINE (1) 09/01/2020     Felecia ANDRADE LPN

## 2020-09-28 NOTE — PROGRESS NOTES
"Onel White is a 29 year old male who is being evaluated via a billable telephone visit.      The patient has been notified of following:     \"This telephone visit will be conducted via a call between you and your physician/provider. We have found that certain health care needs can be provided without the need for a physical exam.  This service lets us provide the care you need with a short phone conversation.  If a prescription is necessary we can send it directly to your pharmacy.  If lab work is needed we can place an order for that and you can then stop by our lab to have the test done at a later time.    Telephone visits are billed at different rates depending on your insurance coverage. During this emergency period, for some insurers they may be billed the same as an in-person visit.  Please reach out to your insurance provider with any questions.    If during the course of the call the physician/provider feels a telephone visit is not appropriate, you will not be charged for this service.\"    Patient has given verbal consent for Telephone visit?  Yes    What phone number would you like to be contacted at? [phone]    How would you like to obtain your AVS? Mikael Lynch     Onel White is a 29 year old male who presents via phone visit today for the following health issues:    HPI    Asthma Follow-Up    Was ACT completed today?  No      Do you have a cough?  No    Are you experiencing any wheezing in your chest?  No    Do you have any shortness of breath?  No     How often are you using a short-acting (rescue) inhaler or nebulizer, such as Albuterol?  A few times a week    How many days per week do you miss taking your asthma controller medication?  0    Please describe any recent triggers for your asthma: pollens    Have you had any Emergency Room Visits, Urgent Care Visits, or Hospital Admissions since your last office visit?  No      How many servings of fruits and vegetables do you eat " daily?  2-3    On average, how many sweetened beverages do you drink each day (Examples: soda, juice, sweet tea, etc.  Do NOT count diet or artificially sweetened beverages)?   8    How many days per week do you exercise enough to make your heart beat faster? 7    How many minutes a day do you exercise enough to make your heart beat faster? 60 or more  How many days per week do you miss taking your medication? 1 a week    What makes it hard for you to take your medications?  when in a hurry for work    Patient is a 29 year old male who calls in today for follow up on breathing issues. He has been managing his asthma with Breo Ellipta and albuterol as needed. Patient does have some prednisone on hand to use if breathing worsens. I have advised him to only use this when absolutely necessary as overuse of this medication increases risk for adverse effect. He says that he recently return to work which is has quite a bit of dust. He did have to use prednisone for two days as his breathing issues flared. Patient does smoke marijuana regularly which he asserts is for his ulcerative colitis. It is unclear as to whether this is medically prescribed or obtained recreationally. Patient continues to use his lexapro daily and says that he does notice an improvement in his mood. Denies thoughts of wanting to hurt self or others.     Review of Systems   Constitutional, HEENT, cardiovascular, pulmonary, gi and gu systems are negative, except as otherwise noted.       Objective          Vitals:  No vitals were obtained today due to virtual visit.    healthy, alert and no distress  PSYCH: Alert and oriented times 3; coherent speech, normal   rate and volume, able to articulate logical thoughts, able   to abstract reason, no tangential thoughts, no hallucinations   or delusions  His affect is normal  RESP: No cough, no audible wheezing, able to talk in full sentences  Remainder of exam unable to be completed due to telephone  visits            Assessment/Plan:    Assessment & Plan     Moderate persistent asthma with acute exacerbation  Refill of medications sent to the pharmacy. Patient advised only to use when needed. Also advise to refrain from smoking of any kind. If his marijuana is medical he can pursue non-inhalant options.   - albuterol (PROVENTIL) (2.5 MG/3ML) 0.083% neb solution; Take 1 vial (2.5 mg) by nebulization 3 times daily as needed for shortness of breath / dyspnea or wheezing (space out from inhaler by 4 hrs)    Depression, unspecified depression type  Denies SI/HI. No changes advised at this time.     Marijuana smoker  See above.   - albuterol (PROAIR HFA/PROVENTIL HFA/VENTOLIN HFA) 108 (90 Base) MCG/ACT inhaler; Inhale 1-2 puffs into the lungs every 4 hours as needed for shortness of breath / dyspnea or wheezing      Return in about 6 months (around 3/28/2021) for Return for scheduled annual checkup with PCP.    Chun Herr PA-C  [facility]    Phone call duration:  8 minutes

## 2020-10-16 ENCOUNTER — INFUSION THERAPY VISIT (OUTPATIENT)
Dept: INFUSION THERAPY | Facility: CLINIC | Age: 30
End: 2020-10-16
Payer: COMMERCIAL

## 2020-10-16 VITALS
TEMPERATURE: 97.9 F | WEIGHT: 220 LBS | OXYGEN SATURATION: 98 % | DIASTOLIC BLOOD PRESSURE: 79 MMHG | BODY MASS INDEX: 27.5 KG/M2 | SYSTOLIC BLOOD PRESSURE: 120 MMHG | RESPIRATION RATE: 16 BRPM | HEART RATE: 68 BPM

## 2020-10-16 DIAGNOSIS — K51.011 ULCERATIVE PANCOLITIS WITH RECTAL BLEEDING (H): Primary | ICD-10-CM

## 2020-10-16 DIAGNOSIS — R79.89 ABNORMAL LFTS: Primary | ICD-10-CM

## 2020-10-16 LAB
ALBUMIN SERPL-MCNC: 4.2 G/DL (ref 3.4–5)
ALP SERPL-CCNC: 414 U/L (ref 40–150)
ALT SERPL W P-5'-P-CCNC: 177 U/L (ref 0–70)
AST SERPL W P-5'-P-CCNC: 146 U/L (ref 0–45)
BASOPHILS # BLD AUTO: 0.1 10E9/L (ref 0–0.2)
BASOPHILS NFR BLD AUTO: 0.7 %
BILIRUB DIRECT SERPL-MCNC: 0.2 MG/DL (ref 0–0.2)
BILIRUB SERPL-MCNC: 0.6 MG/DL (ref 0.2–1.3)
CRP SERPL-MCNC: <2.9 MG/L (ref 0–8)
DIFFERENTIAL METHOD BLD: ABNORMAL
EOSINOPHIL # BLD AUTO: 1 10E9/L (ref 0–0.7)
EOSINOPHIL NFR BLD AUTO: 14 %
ERYTHROCYTE [DISTWIDTH] IN BLOOD BY AUTOMATED COUNT: 14.6 % (ref 10–15)
ERYTHROCYTE [SEDIMENTATION RATE] IN BLOOD BY WESTERGREN METHOD: 7 MM/H (ref 0–15)
HCT VFR BLD AUTO: 48.3 % (ref 40–53)
HGB BLD-MCNC: 15.4 G/DL (ref 13.3–17.7)
IMM GRANULOCYTES # BLD: 0 10E9/L (ref 0–0.4)
IMM GRANULOCYTES NFR BLD: 0.1 %
LYMPHOCYTES # BLD AUTO: 1.8 10E9/L (ref 0.8–5.3)
LYMPHOCYTES NFR BLD AUTO: 26.3 %
MCH RBC QN AUTO: 27.8 PG (ref 26.5–33)
MCHC RBC AUTO-ENTMCNC: 31.9 G/DL (ref 31.5–36.5)
MCV RBC AUTO: 87 FL (ref 78–100)
MONOCYTES # BLD AUTO: 0.5 10E9/L (ref 0–1.3)
MONOCYTES NFR BLD AUTO: 7.6 %
NEUTROPHILS # BLD AUTO: 3.5 10E9/L (ref 1.6–8.3)
NEUTROPHILS NFR BLD AUTO: 51.3 %
PLATELET # BLD AUTO: 234 10E9/L (ref 150–450)
PROT SERPL-MCNC: 8.3 G/DL (ref 6.8–8.8)
RBC # BLD AUTO: 5.53 10E12/L (ref 4.4–5.9)
WBC # BLD AUTO: 6.9 10E9/L (ref 4–11)

## 2020-10-16 PROCEDURE — 96413 CHEMO IV INFUSION 1 HR: CPT | Performed by: INTERNAL MEDICINE

## 2020-10-16 PROCEDURE — 85025 COMPLETE CBC W/AUTO DIFF WBC: CPT | Performed by: INTERNAL MEDICINE

## 2020-10-16 PROCEDURE — 99207 PR NO CHARGE LOS: CPT

## 2020-10-16 PROCEDURE — 85652 RBC SED RATE AUTOMATED: CPT | Performed by: INTERNAL MEDICINE

## 2020-10-16 PROCEDURE — 86140 C-REACTIVE PROTEIN: CPT | Performed by: INTERNAL MEDICINE

## 2020-10-16 PROCEDURE — 36415 COLL VENOUS BLD VENIPUNCTURE: CPT | Performed by: INTERNAL MEDICINE

## 2020-10-16 PROCEDURE — 80076 HEPATIC FUNCTION PANEL: CPT | Performed by: INTERNAL MEDICINE

## 2020-10-16 RX ORDER — HEPARIN SODIUM,PORCINE 10 UNIT/ML
5 VIAL (ML) INTRAVENOUS
Status: CANCELLED | OUTPATIENT
Start: 2020-12-07

## 2020-10-16 RX ORDER — HEPARIN SODIUM (PORCINE) LOCK FLUSH IV SOLN 100 UNIT/ML 100 UNIT/ML
5 SOLUTION INTRAVENOUS
Status: CANCELLED | OUTPATIENT
Start: 2020-12-07

## 2020-10-16 RX ADMIN — Medication 250 ML: at 14:25

## 2020-10-16 ASSESSMENT — PAIN SCALES - GENERAL: PAINLEVEL: NO PAIN (0)

## 2020-10-19 ENCOUNTER — TELEPHONE (OUTPATIENT)
Dept: GASTROENTEROLOGY | Facility: CLINIC | Age: 30
End: 2020-10-19

## 2020-10-19 NOTE — TELEPHONE ENCOUNTER
Spoke to pt. Informed him of results of liver enzymes per Dr. Hancock.   Scheduled a follow-up TV with Dr. Hancock.  Transferred pt to Imaging for US scheduling.    Pt does not have any further questions at this time.    Yesi Floyd RN  Gastroenterology Care Coordinator  [place], [place]

## 2020-10-19 NOTE — TELEPHONE ENCOUNTER
See Dr. Hancock result note.  Call pt to follow-up.    Yesi Floyd RN  Gastroenterology Care Coordinator  [place], [place]

## 2020-10-26 ENCOUNTER — ANCILLARY PROCEDURE (OUTPATIENT)
Dept: ULTRASOUND IMAGING | Facility: CLINIC | Age: 30
End: 2020-10-26
Attending: INTERNAL MEDICINE
Payer: COMMERCIAL

## 2020-10-26 DIAGNOSIS — R79.89 ABNORMAL LFTS: ICD-10-CM

## 2020-10-26 PROCEDURE — 76700 US EXAM ABDOM COMPLETE: CPT | Mod: 59 | Performed by: RADIOLOGY

## 2020-10-26 PROCEDURE — 93975 VASCULAR STUDY: CPT | Performed by: RADIOLOGY

## 2020-11-07 DIAGNOSIS — F43.23 ADJUSTMENT DISORDER WITH MIXED ANXIETY AND DEPRESSED MOOD: ICD-10-CM

## 2020-11-09 RX ORDER — ESCITALOPRAM OXALATE 10 MG/1
10 TABLET ORAL DAILY
Qty: 15 TABLET | Refills: 0 | Status: SHIPPED | OUTPATIENT
Start: 2020-11-09 | End: 2021-02-17

## 2020-11-09 NOTE — TELEPHONE ENCOUNTER
escitalopram (LEXAPRO) 10 MG tablet   Take 1 tablet (10 mg) by mouth daily      Last Written Prescription Date:  8/10/20  Last Fill Quantity: 30,   # refills: 0  Last Office Visit : 5/7/20 virtual visit   Future Office visit: none    Routing refill request to provider for review/approval because:  Overdue visit & PHQ9  2 fanta refills and no appointment scheduled/ PHQ9 due.   30 day pended. Deferred to Provider for review/approval.   Overdue for RTC in 1 month - no scheduled appointment.  + Per medication refill protocol, 6 months.   Scheduling has been notified to contact the pt for appointment.

## 2020-11-17 ENCOUNTER — VIRTUAL VISIT (OUTPATIENT)
Dept: GASTROENTEROLOGY | Facility: CLINIC | Age: 30
End: 2020-11-17
Payer: COMMERCIAL

## 2020-11-17 DIAGNOSIS — K51.011 ULCERATIVE PANCOLITIS WITH RECTAL BLEEDING (H): Primary | ICD-10-CM

## 2020-11-17 DIAGNOSIS — R79.89 ABNORMAL LIVER FUNCTION TEST: ICD-10-CM

## 2020-11-17 DIAGNOSIS — D84.9 IMMUNOSUPPRESSION (H): ICD-10-CM

## 2020-11-17 PROBLEM — F10.20 ALCOHOL DEPENDENCE (H): Status: ACTIVE | Noted: 2020-11-17

## 2020-11-17 PROCEDURE — 99213 OFFICE O/P EST LOW 20 MIN: CPT | Mod: 95 | Performed by: INTERNAL MEDICINE

## 2020-11-17 NOTE — PATIENT INSTRUCTIONS
It was nice to talk to you today.  As we discussed, I think we will need to get a liver biopsy in order to sort out why the liver tests are still abnormal.  I asked the radiology department if they can do this liver biopsy with anesthesia.  If they are able to schedule this, you may need to have a preop H&P with your primary care but I will check on this.  I would also avoid drinking alcohol and we will see if this helps the liver test to improve.  After liver biopsy, we may have you meet with hepatologist if there is still any question as to why the liver tests are abnormal.  Continue to use your Entyvio for the colitis.  We should plan to do a follow-up colonoscopy to recheck things in about March 2021.  Please let me know if you have any questions   warm blanket given/no

## 2020-11-17 NOTE — PROGRESS NOTES
".Onel White is a 29 year old male who is being evaluated via a billable telephone visit.      The patient has been notified of following:     \"This telephone visit will be conducted via a call between you and your physician/provider. We have found that certain health care needs can be provided without the need for a physical exam.  This service lets us provide the care you need with a short phone conversation.  If a prescription is necessary we can send it directly to your pharmacy.  If lab work is needed we can place an order for that and you can then stop by our lab to have the test done at a later time.    Telephone visits are billed at different rates depending on your insurance coverage. During this emergency period, for some insurers they may be billed the same as an in-person visit.  Please reach out to your insurance provider with any questions.    If during the course of the call the physician/provider feels a telephone visit is not appropriate, you will not be charged for this service.\"    Patient has given verbal consent for Telephone visit?  Yes    What phone number would you like to be contacted at? [phone]     How would you like to obtain your AVS? Mikael    Phone call duration:  minutes    Jayden Bradford MA        "

## 2020-11-18 NOTE — PROGRESS NOTES
GASTROENTEROLOGY Follow-up Telephone Visit  IBD CLINIC VISIT     REASON FOR CONSULTATION:   No ref. provider found for   Chief Complaint   Patient presents with     RECHECK     discuss IBD treatment and US findings       HISTORY OF PRESENT ILLNESS:    Onel White is a 29 year old male who is being evaluated via a billable video visit.      We reevaluated him today for ulcerative colitis and abnormal liver test.  We previously saw him in April of this year.  He notes that he has been feeling well on Entyvio.  He has had 3 or 4 infusions now and notes that he has significantly improved.  He is having formed stools, no abdominal pain.  He notes occasional blood in the stool.  He notes no changes with diet.  He reports that he has gained 25 pounds recently.  He does not note any appetite changes.    He notes no abdominal distention, no confusion, no blood in the stool is noted.  He is drinking alcohol about 4-5 drinks every other day.  He notes a family history of a father with some abnormal liver tests, he reports possibly hepatitis E but he is not sure.      ASSESSMENT/PLAN    Onel has ulcerative colitis which is responding clinically to Entyvio every 8 weeks.  He is now having formed stools and no abdominal pain which is a significant improvement.  There is some occasional blood in the stool which is of some concern although overall I think things are much better.  I would like to get a follow-up colonoscopy in 3 to 4 months to confirm he is in endoscopic and histologic remission.  If there are any concerns regarding that he is maintaining remission, we could consider adding immunomodulator.    My main concern today is his abnormal liver test.  He has a mixed hepatocellular, cholestatic pattern.  He drinks too much alcohol and have counseled him to stop this.  However, I am not sure that this accounts for his abnormal liver test.  I think that a liver biopsy is indicated in order to evaluate for small duct PSC,  drug reaction secondary to vedolizumab or ABELARDO/DOYLE.  He did have a prior biopsy in 2009 which was nonspecific at that point.  He is in agreement to doing the liver biopsy but wants anesthesia for this.  We will ask IR if sedation or anesthesia is possible.    IBD HISTORY  Age at diagnosis:  Dx 2009  Extent of disease:  Pancolitis  Current UC medications:  Vedolizumab q8wk  Prior UC surgeries:  none  Prior IBD Medications:  Sulfasalazine, steroids, Lialda      DRUG MONITORING  TPMT enzyme activity:   Phenotype normal, level 33    6-TGN/6-MMPN levels:  none    Biologic concentration:  none    DISEASE ASSESSMENT  Labs:  Recent Labs   Lab Test 10/16/20  1344 09/15/20  1630   CRP <2.9 <2.9   SED 7 5     Endoscopic assessment: Moderate to severe activity on colonoscopy 4/2019  Ortega 1 in rectum and sigmoid, Ortega 2 throughout the rest to the cecum  Enterography: none  Fecal calprotectin: June 2020 was 1060  C diff: none    sIBDQ:  No flowsheet data found.     IBD Health Care Maintenance:    Vaccinations:  All patients on biologics should avoid live vaccines.    -- Influenza (every year)  -- TdaP (every 10 years)  -- Pneumococcal Pneumonia (once plus booster at 5 years)  -- Yearly assessment for latent Tb (verbal screening and exam, PPD or QuantiFERON-Tb testing)    One time confirmation of immunity or serologies:  -- Hepatitis A (serologies or immunizations)  -- Hepatitis B (serologies or immunizations)  -- Varicella  -- MMR  -- HPV (all aged 18-26)  -- Meningococcal meningitis (all patients at risk for meningitis)-- Due to the immunosuppression in this patient, I would not advise administration of live vaccines such as varicella/VZV, intranasal influenza, MMR, or yellow fever vaccine (if travelling).      Bone mineral density screening   -- Recommend all patients supplement with calcium and vitamin D  -- Given prior steroid use recommend DEXA if not already done    Cancer Screening:  Colon cancer screening:  Given  pancoltis, recommend patient undergo regular dysplasia surveillance   Next dysplasia screening is recommended March 2021.    Cervical cancer screening: N/A    Skin cancer screening: Annual visual exam of skin by dermatologist since patient is immunocompromised    Depression Screening:  -- Over the last month, have you felt down, depressed, or hopeless? no  -- Over the last month, have you felt little interest or pleasure doing things? no    Misc:  -- Avoid tobacco use  -- Avoid NSAIDs as there is potentially a 25% chance of causing an IBD flare       I have reviewed and updated the patient's Past Medical History, Social History, Family History and Medication List.    Exam:    General appearance:  Healthy appearing adult, in no acute distress  Eyes:  Sclera anicteric, Pupils round and reactive to light  Ears, nose, mouth and throat:  No obvious external lesions of ears and nose.  Hearing intact  Neck:  Symmetric, No obvious external lesions  Respiratory:  Normal respiration, no use of accessory muscles   MSK:  No visual upper extremity, neck or facial muscle atrophy  ABD:  No visual abdominal distention, no audible borborygmi  Skin:  No rashes or jaundice   Psychiatric:  Oriented to person, place and time, Appropriate mood and affect.   Neurologic:  Peripheral muscle function and dexterity appear to be intact      Telephone Visit Time:  19 minutes    Liang Hancock MD    RTC 3 months

## 2020-11-19 DIAGNOSIS — K51.011 ULCERATIVE PANCOLITIS WITH RECTAL BLEEDING (H): ICD-10-CM

## 2020-11-19 DIAGNOSIS — R79.89 ABNORMAL LFTS: Primary | ICD-10-CM

## 2020-11-19 NOTE — PROGRESS NOTES
"Outpatient interventional radiology procedure entered    IR Liver Biopsy Percutaneous [[id number]]    Electronically signed by: Liang Hancock MD on 11/17/20 5789     Comments    Patient is requesting sedation or anesthesia with the procedure.     Exam reason abnormal liver tests.  pt has ulcerative colitis.  Also, has alcohol exposure and is on vedolizumab.  eval for small duct psc, autoimmune hep, ABELARDO/DOYLE or drug effect     Associated Diagnoses    Ulcerative pancolitis with rectal bleeding (H) [K51.011]  - Primary       Abnormal liver function test [R94.5]         Ultrasound of the abdomen dated 10/26/2020 reviewed.    Random liver biopsy requested.  Approved for IR procedure.    Review of chart data shows the patient is on no anticoagulation    Platelets = 234  INR = none    COVID-19 PCR results = none recent    Referring provider has placed outpatient procedure order for scheduling.      *VERY IMPORTANT*  * Please note-very important *   The procedure requesting provider is asked to place desired specimen testing orders into the chart in a \"Signed & Held\" status.  Please follow the directions and the tip sheet link provided below.  These orders need to be entered and signed by a medical provider, not nurse, before the procedure can be scheduled.  Prompt attention to this matter will prevent unnecessary patient scheduling delays.    Referring provider must enter desired specimen testing orders prior to IR procedure scheduling.  Medical provider, not RN, please enter the orders by Orders Only Encounter, Orders for Admission, utilizing \"IR RAD Biopsy or Fluid Aspirate Specimens\" Order Set, Sign and Hold for Admission, Reason- Pre Procedure.    [email or web address]    Thank you,  REBEKAH Stoddard, PASwetaC    "

## 2020-12-05 DIAGNOSIS — J20.9 ACUTE BRONCHITIS WITH SYMPTOMS > 10 DAYS: ICD-10-CM

## 2020-12-07 RX ORDER — PREDNISONE 10 MG/1
TABLET ORAL
Qty: 30 TABLET | Refills: 0 | Status: SHIPPED | OUTPATIENT
Start: 2020-12-07 | End: 2021-02-17

## 2020-12-07 NOTE — TELEPHONE ENCOUNTER
Requested Prescriptions   Pending Prescriptions Disp Refills     predniSONE (DELTASONE) 10 MG tablet [Pharmacy Med Name: PREDNISONE 10MG TABS] 30 tablet 0     Sig: TAKE 4 TABLETS BY MOUTH DAILY FOR 3 DAYS THEN TAKE 3 TABLETS DAILY FOR 3 DAYS THEN TAKE 2 TABLETS DAILY FOR 3 DAYS THEN TAKE ONE TABLET DAILY FOR 3 DAYS THEN STOP     Last Written Prescription Date:  09/21/2020  Last Fill Quantity: 30,   # refills: 0  Last Office Visit: 09/28/2020  Future Office visit:       Routing refill request to provider for review/approval because:  Drug not on the [facility], [facility] or [facility] refill protocol or controlled substance    Ashtyn Barahona MA

## 2020-12-18 ENCOUNTER — INFUSION THERAPY VISIT (OUTPATIENT)
Dept: INFUSION THERAPY | Facility: CLINIC | Age: 30
End: 2020-12-18
Payer: COMMERCIAL

## 2020-12-18 VITALS
HEART RATE: 80 BPM | DIASTOLIC BLOOD PRESSURE: 90 MMHG | OXYGEN SATURATION: 96 % | SYSTOLIC BLOOD PRESSURE: 130 MMHG | TEMPERATURE: 98.3 F | RESPIRATION RATE: 16 BRPM | WEIGHT: 223 LBS | BODY MASS INDEX: 27.87 KG/M2

## 2020-12-18 DIAGNOSIS — K51.011 ULCERATIVE PANCOLITIS WITH RECTAL BLEEDING (H): Primary | ICD-10-CM

## 2020-12-18 PROCEDURE — 99207 PR NO CHARGE LOS: CPT

## 2020-12-18 PROCEDURE — 96413 CHEMO IV INFUSION 1 HR: CPT | Performed by: INTERNAL MEDICINE

## 2020-12-18 RX ORDER — HEPARIN SODIUM,PORCINE 10 UNIT/ML
5 VIAL (ML) INTRAVENOUS
Status: CANCELLED | OUTPATIENT
Start: 2021-02-05

## 2020-12-18 RX ORDER — HEPARIN SODIUM (PORCINE) LOCK FLUSH IV SOLN 100 UNIT/ML 100 UNIT/ML
5 SOLUTION INTRAVENOUS
Status: CANCELLED | OUTPATIENT
Start: 2021-02-05

## 2020-12-18 RX ADMIN — Medication 250 ML: at 14:22

## 2020-12-18 ASSESSMENT — PAIN SCALES - GENERAL: PAINLEVEL: NO PAIN (0)

## 2020-12-18 NOTE — PROGRESS NOTES
Infusion Nursing Note:  Onel ALVAREZ Harriettjacques presents today for Entyvio.    Patient seen by provider today: No   present during visit today: Not Applicable.    Note: N/A.    Intravenous Access:  Peripheral IV placed.    Treatment Conditions:  Biological Infusion Checklist:  ~~~ NOTE: If the patient answers yes to any of the questions below, hold the infusion and contact ordering provider or on-call provider.    1. Have you recently had an elevated temperature, fever, chills, productive cough, coughing for 3 weeks or longer or hemoptysis, abnormal vital signs, night sweats,  chest pain or have you noticed a decrease in your appetite, unexplained weight loss or fatigue? No  2. Do you have any open wounds or new incisions? No  3. Do you have any recent or upcoming hospitalizations, surgeries or dental procedures? No  4. Do you currently have or recently have had any signs of illness or infection or are you on any antibiotics? No  5. Have you had any new, sudden or worsening abdominal pain? No  6. Have you or anyone in your household received a live vaccination in the past 4 weeks? Please note:  No live vaccines while on biologic/chemotherapy until 6 months after the last treatment.  Patient can receive the flu vaccine (shot only) and the pneumovax.  It is optimal for the patient to get these vaccines mid cycle, but they can be given at any time as long as it is not on the day of the infusion. No  7. Have you recently been diagnosed with any new nervous system diseases (ie. Multiple sclerosis, Guillain Barney, seizures, neurological changes) or cancer diagnosis? No  8. Are you on any form of radiation or chemotherapy? No  9. Are you pregnant or breast feeding or do you have plans of pregnancy in the future? No  10. Have you been having any signs of worsening depression or suicidal ideations?  (benlysta only) No  11. Have there been any other new onset medical symptoms? No    Post Infusion Assessment:  Patient  tolerated infusion without incident.  Site patent and intact, free from redness, edema or discomfort.  No evidence of extravasations.  Access discontinued per protocol.       Discharge Plan:   Patient will return in 8 weeks for next appointment.  Patient directed to scheduling.   Patient discharged in stable condition accompanied by: self.  Departure Mode: Ambulatory.    Grace Gomes RN

## 2021-01-09 ENCOUNTER — HEALTH MAINTENANCE LETTER (OUTPATIENT)
Age: 31
End: 2021-01-09

## 2021-01-20 ENCOUNTER — TELEPHONE (OUTPATIENT)
Dept: INTERVENTIONAL RADIOLOGY/VASCULAR | Facility: CLINIC | Age: 31
End: 2021-01-20
Payer: COMMERCIAL

## 2021-01-20 NOTE — TELEPHONE ENCOUNTER
Referring providers name, location, phone number and fax:   Liang Hancock MD  [facility]  Phone [phone]  Fax not found    Reason for visit:   IR LIVER BIOPSY PERCUTANEOUS    Abnormal LFTs  Ulcerative pancolitis with rectal bleeding (H)    Does patient have a referral:  Yes, [phone]     Referral to specific provider?   No    Medical records or notes if possible:   In Epic    Please call Pt to schedule this appointment per his Referral [id number].    Thank you!

## 2021-01-28 DIAGNOSIS — J20.9 ACUTE BRONCHITIS WITH SYMPTOMS > 10 DAYS: ICD-10-CM

## 2021-01-28 NOTE — TELEPHONE ENCOUNTER
Pending Prescriptions:                       Disp   Refills    predniSONE (DELTASONE) 10 MG tablet [Pharm*30 tab*0        Sig: TAKE 4 TABLETS BY MOUTH DAILY FOR 3 DAYS THEN TAKE 3           TABLETS DAILY FOR 3 DAYS THEN TAKE 2 TABLETS           DAILY FOR 3 DAYS THEN TAKE ONE TABLET DAILY FOR 3           DAYS THEN STOP    Last Written Prescription Date:  12/7/20  Last Fill Quantity: 30,  # refills: 0   Last office visit: 9/28/20 [place]  Future Office Visit:      Routing refill request to provider for review/approval because:  Drug not active on patient's medication list    Lilli Duffy RN on 1/28/2021 at 3:25 PM

## 2021-01-29 RX ORDER — PREDNISONE 10 MG/1
TABLET ORAL
Qty: 30 TABLET | Refills: 0 | OUTPATIENT
Start: 2021-01-29

## 2021-01-29 NOTE — TELEPHONE ENCOUNTER
I have not seen the patient in 4 months. He had a full prednisone taper 1 month ago. At this time I recommend that he schedule an appointment to be re-evaluated.     Chun Herr PA-C on 1/29/2021 at 3:57 PM

## 2021-02-12 ENCOUNTER — INFUSION THERAPY VISIT (OUTPATIENT)
Dept: INFUSION THERAPY | Facility: CLINIC | Age: 31
End: 2021-02-12
Payer: COMMERCIAL

## 2021-02-12 VITALS
OXYGEN SATURATION: 98 % | WEIGHT: 223.6 LBS | SYSTOLIC BLOOD PRESSURE: 129 MMHG | RESPIRATION RATE: 14 BRPM | DIASTOLIC BLOOD PRESSURE: 85 MMHG | BODY MASS INDEX: 27.95 KG/M2 | TEMPERATURE: 98.2 F | HEART RATE: 70 BPM

## 2021-02-12 DIAGNOSIS — K51.011 ULCERATIVE PANCOLITIS WITH RECTAL BLEEDING (H): Primary | ICD-10-CM

## 2021-02-12 LAB
ALBUMIN SERPL-MCNC: 4.4 G/DL (ref 3.4–5)
ALP SERPL-CCNC: 135 U/L (ref 40–150)
ALT SERPL W P-5'-P-CCNC: 91 U/L (ref 0–70)
AST SERPL W P-5'-P-CCNC: 46 U/L (ref 0–45)
BASOPHILS # BLD AUTO: 0.1 10E9/L (ref 0–0.2)
BASOPHILS NFR BLD AUTO: 0.9 %
BILIRUB DIRECT SERPL-MCNC: 0.1 MG/DL (ref 0–0.2)
BILIRUB SERPL-MCNC: 0.5 MG/DL (ref 0.2–1.3)
CRP SERPL-MCNC: <2.9 MG/L (ref 0–8)
DIFFERENTIAL METHOD BLD: NORMAL
EOSINOPHIL # BLD AUTO: 0.5 10E9/L (ref 0–0.7)
EOSINOPHIL NFR BLD AUTO: 7.2 %
ERYTHROCYTE [DISTWIDTH] IN BLOOD BY AUTOMATED COUNT: 13.2 % (ref 10–15)
ERYTHROCYTE [SEDIMENTATION RATE] IN BLOOD BY WESTERGREN METHOD: 5 MM/H (ref 0–15)
HCT VFR BLD AUTO: 49.2 % (ref 40–53)
HGB BLD-MCNC: 16.1 G/DL (ref 13.3–17.7)
IMM GRANULOCYTES # BLD: 0 10E9/L (ref 0–0.4)
IMM GRANULOCYTES NFR BLD: 0.1 %
LYMPHOCYTES # BLD AUTO: 1.9 10E9/L (ref 0.8–5.3)
LYMPHOCYTES NFR BLD AUTO: 28 %
MCH RBC QN AUTO: 29.3 PG (ref 26.5–33)
MCHC RBC AUTO-ENTMCNC: 32.7 G/DL (ref 31.5–36.5)
MCV RBC AUTO: 90 FL (ref 78–100)
MONOCYTES # BLD AUTO: 0.5 10E9/L (ref 0–1.3)
MONOCYTES NFR BLD AUTO: 7.6 %
NEUTROPHILS # BLD AUTO: 3.9 10E9/L (ref 1.6–8.3)
NEUTROPHILS NFR BLD AUTO: 56.2 %
PLATELET # BLD AUTO: 255 10E9/L (ref 150–450)
PROT SERPL-MCNC: 8.2 G/DL (ref 6.8–8.8)
RBC # BLD AUTO: 5.5 10E12/L (ref 4.4–5.9)
WBC # BLD AUTO: 6.9 10E9/L (ref 4–11)

## 2021-02-12 PROCEDURE — 99207 PR NO CHARGE LOS: CPT

## 2021-02-12 PROCEDURE — 85652 RBC SED RATE AUTOMATED: CPT | Performed by: INTERNAL MEDICINE

## 2021-02-12 PROCEDURE — 86140 C-REACTIVE PROTEIN: CPT | Performed by: INTERNAL MEDICINE

## 2021-02-12 PROCEDURE — 80076 HEPATIC FUNCTION PANEL: CPT | Performed by: INTERNAL MEDICINE

## 2021-02-12 PROCEDURE — 96413 CHEMO IV INFUSION 1 HR: CPT | Performed by: INTERNAL MEDICINE

## 2021-02-12 PROCEDURE — 85025 COMPLETE CBC W/AUTO DIFF WBC: CPT | Performed by: INTERNAL MEDICINE

## 2021-02-12 PROCEDURE — 36415 COLL VENOUS BLD VENIPUNCTURE: CPT | Performed by: INTERNAL MEDICINE

## 2021-02-12 RX ORDER — HEPARIN SODIUM (PORCINE) LOCK FLUSH IV SOLN 100 UNIT/ML 100 UNIT/ML
5 SOLUTION INTRAVENOUS
Status: CANCELLED | OUTPATIENT
Start: 2021-04-09

## 2021-02-12 RX ORDER — HEPARIN SODIUM,PORCINE 10 UNIT/ML
5 VIAL (ML) INTRAVENOUS
Status: CANCELLED | OUTPATIENT
Start: 2021-04-09

## 2021-02-12 RX ADMIN — Medication 250 ML: at 14:55

## 2021-02-12 ASSESSMENT — PAIN SCALES - GENERAL: PAINLEVEL: NO PAIN (0)

## 2021-02-12 NOTE — PROGRESS NOTES
Infusion Nursing Note:  Onel White presents today for Entyvio infusion.    Patient seen by provider today: No   present during visit today: Not Applicable.    Note:   Not applicable.    Intravenous Access:  Peripheral IV placed.    Treatment Conditions:  Biological Infusion Checklist:  ~~~ NOTE: If the patient answers yes to any of the questions below, hold the infusion and contact ordering provider or on-call provider.    1. Have you recently had an elevated temperature, fever, chills, productive cough, coughing for 3 weeks or longer or hemoptysis, abnormal vital signs, night sweats,  chest pain or have you noticed a decrease in your appetite, unexplained weight loss or fatigue? No  2. Do you have any open wounds or new incisions? No  3. Do you have any recent or upcoming hospitalizations, surgeries or dental procedures? No  4. Do you currently have or recently have had any signs of illness or infection or are you on any antibiotics? No  5. Have you had any new, sudden or worsening abdominal pain? No  6. Have you or anyone in your household received a live vaccination in the past 4 weeks? Please note:  No live vaccines while on biologic/chemotherapy until 6 months after the last treatment.  Patient can receive the flu vaccine (shot only) and the pneumovax.  It is optimal for the patient to get these vaccines mid cycle, but they can be given at any time as long as it is not on the day of the infusion. No  7. Have you recently been diagnosed with any new nervous system diseases (ie. Multiple sclerosis, Guillain Occidental, seizures, neurological changes) or cancer diagnosis? No  8. Are you on any form of radiation or chemotherapy? No  9. Have there been any other new onset medical symptoms? No      Post Infusion Assessment:  Patient tolerated infusion without incident.  Blood return noted pre and post infusion.  Site patent and intact, free from redness, edema or discomfort.  No evidence of  extravasations.  Access discontinued per protocol.       Discharge Plan:   AVS to patient via MYCHART.  Patient will return 4/9/21 for next appointment.   Patient discharged in stable condition accompanied by: self.  Departure Mode: Ambulatory.    Anne-Marie Mcgovern RN

## 2021-02-15 NOTE — NURSING NOTE
Patient arrived at [facility] refusing to wear a mask based on medical reasons.  Did not appear SOB on exertion or at rest, w/out cough, was not wearing O2, alert and orientated with good color.  Placed patient in a private room.  Patient refused to provide medical reason why he would not wear a mask.  Stated this was his right and also was well informed that we could not refuse to provide treatment if he was not wearing a mask. Explained to him that I needed to review this with our infection prevention team.     Returned with face shield to use as an alternative.  I explained to Mr White why we wear a mask.  The mask protects us from each other.  I shared with him we wanted to provide safe patient care.  Wearing a mask has shown to be effective in fighting COVID 19.      Patient agreed to wear mask.  Friendly nature.  Escorted to his infusion bay.  No further questions from patient.  Deana Jordan RN BSN OCN PHN  Patient Care Manager

## 2021-02-17 ENCOUNTER — OFFICE VISIT (OUTPATIENT)
Dept: FAMILY MEDICINE | Facility: CLINIC | Age: 31
End: 2021-02-17
Payer: COMMERCIAL

## 2021-02-17 VITALS
SYSTOLIC BLOOD PRESSURE: 128 MMHG | DIASTOLIC BLOOD PRESSURE: 80 MMHG | OXYGEN SATURATION: 94 % | HEART RATE: 82 BPM | BODY MASS INDEX: 29.29 KG/M2 | HEIGHT: 73 IN | TEMPERATURE: 98.4 F | RESPIRATION RATE: 12 BRPM | WEIGHT: 221 LBS

## 2021-02-17 DIAGNOSIS — J45.30 MILD PERSISTENT ASTHMA WITHOUT COMPLICATION: Primary | ICD-10-CM

## 2021-02-17 DIAGNOSIS — F12.20 CANNABIS USE DISORDER, SEVERE, DEPENDENCE (H): ICD-10-CM

## 2021-02-17 DIAGNOSIS — K51.011 ULCERATIVE PANCOLITIS WITH RECTAL BLEEDING (H): ICD-10-CM

## 2021-02-17 DIAGNOSIS — F43.23 ADJUSTMENT DISORDER WITH MIXED ANXIETY AND DEPRESSED MOOD: ICD-10-CM

## 2021-02-17 PROBLEM — D84.9 IMMUNOSUPPRESSION (H): Status: ACTIVE | Noted: 2021-02-17

## 2021-02-17 PROCEDURE — 99214 OFFICE O/P EST MOD 30 MIN: CPT | Performed by: FAMILY MEDICINE

## 2021-02-17 RX ORDER — ESCITALOPRAM OXALATE 20 MG/1
20 TABLET ORAL DAILY
Qty: 30 TABLET | Refills: 0 | Status: SHIPPED | OUTPATIENT
Start: 2021-02-17 | End: 2021-03-06

## 2021-02-17 RX ORDER — PREDNISONE 20 MG/1
20 TABLET ORAL 2 TIMES DAILY
Qty: 10 TABLET | Refills: 4 | Status: SHIPPED | OUTPATIENT
Start: 2021-02-17

## 2021-02-17 RX ORDER — ALBUTEROL SULFATE 90 UG/1
2 AEROSOL, METERED RESPIRATORY (INHALATION) EVERY 4 HOURS PRN
Qty: 18 G | Refills: 11 | Status: SHIPPED | OUTPATIENT
Start: 2021-02-17 | End: 2021-05-26

## 2021-02-17 RX ORDER — FLUTICASONE PROPIONATE AND SALMETEROL XINAFOATE 115; 21 UG/1; UG/1
2 AEROSOL, METERED RESPIRATORY (INHALATION) 2 TIMES DAILY
Qty: 12 G | Refills: 11 | Status: SHIPPED | OUTPATIENT
Start: 2021-02-17 | End: 2021-05-26

## 2021-02-17 ASSESSMENT — ENCOUNTER SYMPTOMS
DYSURIA: 0
NAUSEA: 0
EYE PAIN: 0
COUGH: 0
HEADACHES: 0
NERVOUS/ANXIOUS: 1
MYALGIAS: 1
CONSTIPATION: 0
WEAKNESS: 1
ABDOMINAL PAIN: 0
FREQUENCY: 0
SORE THROAT: 0
PARESTHESIAS: 0
DIZZINESS: 0
ARTHRALGIAS: 1
HEMATOCHEZIA: 1
HEMATURIA: 0
JOINT SWELLING: 0
SHORTNESS OF BREATH: 0
HEARTBURN: 0
FEVER: 0
DIARRHEA: 0
CHILLS: 0
PALPITATIONS: 0

## 2021-02-17 ASSESSMENT — PAIN SCALES - GENERAL: PAINLEVEL: NO PAIN (0)

## 2021-02-17 ASSESSMENT — MIFFLIN-ST. JEOR: SCORE: 2014.98

## 2021-02-17 NOTE — PROGRESS NOTES
SUBJECTIVE:   CC: Onel White is an 30 year old male who presents for Asthma medication refill and change      Patient has been advised of split billing requirements and indicates understanding: Yes  HPI       Asthma Follow-Up    Was ACT completed today?    Yes    ACT Total Scores 2/17/2021   ACT TOTAL SCORE -   ASTHMA ER VISITS -   ASTHMA HOSPITALIZATIONS -   ACT TOTAL SCORE (Goal Greater than or Equal to 20) 17   In the past 12 months, how many times did you visit the emergency room for your asthma without being admitted to the hospital? 0   In the past 12 months, how many times were you hospitalized overnight because of your asthma? 0       How many days per week do you miss taking your asthma controller medication?  1    Please describe any recent triggers for your asthma: smoke, upper respiratory infections, dust mites, pollens, mold, humidity, exercise or sports and cold air    Have you had any Emergency Room Visits, Urgent Care Visits, or Hospital Admissions since your last office visit?  No      Today's PHQ-2 Score:   PHQ-2 ( 1999 Pfizer) 2/17/2021   Q1: Little interest or pleasure in doing things 1   Q2: Feeling down, depressed or hopeless 1   PHQ-2 Score 2   Q1: Little interest or pleasure in doing things Several days   Q2: Feeling down, depressed or hopeless Several days   PHQ-2 Score 2       Onel was seen today for asthma and depression follow-up.  He was offered for a physical exam today but he declined.  He has asthma for years which has been controlled with the Breo Ellipta.  He uses the rescue inhaler rarely with the Breo.  He would like to try a different inhaler as the Breo is quite sensitive.  He did well with Advair in the past but it was also too expensive.  He also needs a refill for the prednisone; he uses as needed for flare up which he uses rarely.  Uses the rescue inhaler rarely.  No coughing, chest tightness sensation, wheezing, dyspnea or orthopnea.  No runny nose or nasal congestion.   He smokes marijuana daily but quit smoking tobacco since .      He has been taking Lexapro for his depression and anxiety.  He was diagnosed with adjustment disorder with mixed anxiety and depressed mood.  Been on the lexapro for years which has helped with no side effect.  He feels his depression and anxiety is better but not fully controlled; still has more bad days than he would like to.  No anxiety attack.  No suicidal or homicidal ideation.  No hallucination.  Not interested to see counselor.    He has been seeing GI specialist for ulcerative pancolitis and has had multiple colonoscopies.  Used to be on immunosuppressant but no longer taking them.      Abuse: Current or Past(Physical, Sexual or Emotional)- No  Do you feel safe in your environment? Yes    Have you ever done Advance Care Planning? (For example, a Health Directive, POLST, or a discussion with a medical provider or your loved ones about your wishes): No, advance care planning information given to patient to review.  Patient declined advance care planning discussion at this time.    Social History     Tobacco Use     Smoking status: Former Smoker     Types: Cigarettes     Quit date: 2012     Years since quittin.7     Smokeless tobacco: Never Used   Substance Use Topics     Alcohol use: Yes     Alcohol/week: 20.0 standard drinks     Types: 20 Standard drinks or equivalent per week     Comment: weekly     If you drink alcohol do you typically have >3 drinks per day or >7 drinks per week? Yes      No flowsheet data found.    Last PSA: No results found for: PSA    Reviewed orders with patient. Reviewed health maintenance and updated orders accordingly - Yes  Patient Active Problem List   Diagnosis     Other acne     UC (Ulcerative Colitis), involving the entire gabi     PPD screening test-2009 Negative     Mild persistent asthma without complication     Cannabis use disorder, severe, dependence (H)     Ulcerative pancolitis with  rectal bleeding (H)     Alcohol dependence (H)     Immunosuppression (H)     Past Surgical History:   Procedure Laterality Date     C APPENDECTOMY,RUPT APPENDX+ABSCESS  2006    peritonitis     COLONOSCOPY       COLONOSCOPY N/A 2019    Procedure: Combined Colonoscopy, Single Or Multiple Biopsy/Polypectomy By Biopsy;  Surgeon: Liang Hancock MD;  Location: [facility]     COLONOSCOPY N/A 2020    Procedure: Colonoscopy, With Polypectomy And Biopsy;  Surgeon: Liang Hancock MD;  Location: [facility]     COLONOSCOPY WITH CO2 INSUFFLATION N/A 2019    Procedure: COLONOSCOPY WITH CO2 INSUFFLATION;  Surgeon: Liang Hancock MD;  Location: [facility]     COLONOSCOPY WITH CO2 INSUFFLATION N/A 2020    Procedure: COLONOSCOPY, WITH CO2 INSUFFLATION;  Surgeon: Liang Hancock MD;  Location: [facility]       Social History     Tobacco Use     Smoking status: Former Smoker     Types: Cigarettes     Quit date: 2012     Years since quittin.7     Smokeless tobacco: Never Used   Substance Use Topics     Alcohol use: Yes     Alcohol/week: 20.0 standard drinks     Types: 20 Standard drinks or equivalent per week     Comment: one after work every day and weekends 5-6 each weekend day     Family History   Problem Relation Age of Onset     Gastrointestinal Disease Father         crohns/ulceratice colitis     Asthma Father      Cancer Father         Lymphoma     Diabetes Paternal Grandmother      Depression Paternal Grandmother      Cancer Paternal Grandmother         Pancreatic         Current Outpatient Medications   Medication Sig Dispense Refill     albuterol (PROAIR HFA/PROVENTIL HFA/VENTOLIN HFA) 108 (90 Base) MCG/ACT inhaler Inhale 2 puffs into the lungs every 4 hours as needed for shortness of breath / dyspnea or wheezing 18 g 11     albuterol (PROVENTIL) (2.5 MG/3ML) 0.083% neb solution Take 1 vial (2.5 mg) by nebulization 3 times daily as needed for shortness of breath /  dyspnea or wheezing (space out from inhaler by 4 hrs) 180 mL 11     escitalopram (LEXAPRO) 20 MG tablet Take 1 tablet (20 mg) by mouth daily Please call Primary Care Clinic at [phone] to schedule appointment. Thank you. 30 tablet 0     fluticasone-salmeterol (ADVAIR-HFA) 115-21 MCG/ACT inhaler Inhale 2 puffs into the lungs 2 times daily 12 g 11     predniSONE (DELTASONE) 20 MG tablet Take 1 tablet (20 mg) by mouth 2 times daily 10 tablet 4     bisacodyl (DULCOLAX) 5 MG EC tablet Take 3 tablets (15 mg) by mouth See Admin Instructions --Take all 3 tablets at 5 pm day prior to procedure 3 tablet 0     Na Sulfate-K Sulfate-Mg Sulf (SUPREP BOWEL PREP KIT) solution Take 177 mLs (1 Bottle) by mouth See Admin Instructions -Split dose 2 day Regimen: The evening before your procedure: dilute one bottle with water to a total volume of 16oz (up to the fill line).  At 6pm,  drink the entire amount.  Drink 32 oz of water over the next hour. The morning of your procedure repeat both steps above using the second bottle.  Start 5 hours before your procedure and complete the prep at least 3 hours before you arrive. 2 Bottle 0     polyethylene glycol (GOLYTELY) 236 g suspension Take 4,000 mLs by mouth See Admin Instructions Drink half gallon (64oz) at 6 pm on evening prior to procedure and second half on morning of procedure (4 hours prior to procedure time) 4000 mL 0     Simethicone 125 MG TABS Take 125 mg by mouth See Admin Instructions --Take tablet after finishing second half of Golytely with half a glass of water. 1 tablet 0     Simethicone 125 MG TABS Take 125 mg by mouth See Admin Instructions --Take tablet after finishing second half of Suprep with half a glass of water. 1 tablet 0     UNABLE TO FIND entivia       Allergies   Allergen Reactions     Wheat        Reviewed and updated as needed this visit by clinical staff   Allergies  Meds              Reviewed and updated as needed this visit by Provider               "    Review of Systems  Please see subjective otherwise the complete system was negative.    OBJECTIVE:   /80   Pulse 82   Temp 98.4  F (36.9  C)   Resp 12   Ht 1.844 m (6' 0.6\")   Wt 100.2 kg (221 lb)   SpO2 94%   BMI 29.48 kg/m      Physical Exam  GENERAL: healthy, alert and no distress.  Speaking in full sentences  HENT: ear canals and TM's normal.  Nares are non-congested. Oropharynx is pink and moist. No tender with palpation to the sinuses.   RESP: Clear, no wheezes or crackle with good respiratory effort throughout.  CV: regular rate and rhythm, normal S1 S2, no S3 or S4, no murmur  ABDOMEN: soft, nontender, nondistended, no palpable masses organomegaly with normal bowel sound.  Psy: Dressed appropriate.  Speech was normal, easily comprehended.  Thoughts intact, no suicidal or homicidal ideation.  No hallucination.  Affect was bright and pleasant.    Diagnostic Test Results:  Labs reviewed in Epic  No results found for any visits on 02/17/21.    ASSESSMENT/PLAN:   1. Mild persistent asthma without complication  Controlled and is doing well.  Encouraged to stop smoking marijuana.  Switch to Advair inhaler at it is now generic, stopped the Breo.  Continue to use the rescue inhaler as needed.  Symptoms that need to be seen to call in discussed.  Prednisone was refilled to be used as needed for acute flareup.  Instructed to go to the emergency room if develop breathing difficulty with a flareup and to follow-up if his symptoms do not respond or improve with the steroid.    - albuterol (PROAIR HFA/PROVENTIL HFA/VENTOLIN HFA) 108 (90 Base) MCG/ACT inhaler; Inhale 2 puffs into the lungs every 4 hours as needed for shortness of breath / dyspnea or wheezing  Dispense: 18 g; Refill: 11  - predniSONE (DELTASONE) 20 MG tablet; Take 1 tablet (20 mg) by mouth 2 times daily  Dispense: 10 tablet; Refill: 4  - fluticasone-salmeterol (ADVAIR-HFA) 115-21 MCG/ACT inhaler; Inhale 2 puffs into the lungs 2 times daily  " "Dispense: 12 g; Refill: 11    2. Cannabis use disorder, severe, dependence (H)  Encouraged him to stop smoking marijuana.  Discussed about medical marijuana but he declined.  He stated it is cheaper to buy it on his own.    3. Adjustment disorder with mixed anxiety and depressed mood  Overall it is stable but not controlled.  Still feeling depressed and has anxiety on and off.  No suicidal or homicidal ideation.  Tolerated Lexapro at 10 mg well and he feels it has helped.  Increased the Lexapro to 20 mg daily and potential side effect.  Follow-up in a month virtually, earlier as needed.      - escitalopram (LEXAPRO) 20 MG tablet; Take 1 tablet (20 mg) by mouth daily  Dispense: 30 tablet; Refill: 0    4. Ulcerative pancolitis with rectal bleeding (H)  Overall stable, not on immunosuppressant.  Recommendeded follow-up with a GI specialist as per his/her recommendation.      Recommend pneumococcal, Tdap and influenza vaccination but he declined.  Risk and benefit discussed and he is willing to take the risk.    Estimated body mass index is 27.95 kg/m  as calculated from the following:    Height as of 6/15/20: 1.905 m (6' 3\").    Weight as of 2/12/21: 101.4 kg (223 lb 9.6 oz).     Weight management plan: Patient was referred to their PCP to discuss a diet and exercise plan.    He reports that he quit smoking about 8 years ago. His smoking use included cigarettes. He has never used smokeless tobacco.      Counseling Resources:  ATP IV Guidelines  Pooled Cohorts Equation Calculator  FRAX Risk Assessment  ICSI Preventive Guidelines  Dietary Guidelines for Americans, 2010  USDA's MyPlate  ASA Prophylaxis  Lung CA Screening    Nehemiah Hernandez Mai, MD  [facility]  "

## 2021-02-18 DIAGNOSIS — Z11.59 ENCOUNTER FOR SCREENING FOR OTHER VIRAL DISEASES: ICD-10-CM

## 2021-02-18 ASSESSMENT — ASTHMA QUESTIONNAIRES: ACT_TOTALSCORE: 17

## 2021-02-24 RX ORDER — BISACODYL 5 MG/1
15 TABLET, DELAYED RELEASE ORAL SEE ADMIN INSTRUCTIONS
Qty: 3 TABLET | Refills: 0 | Status: SHIPPED | OUTPATIENT
Start: 2021-02-24 | End: 2021-05-26

## 2021-02-24 RX ORDER — SODIUM, POTASSIUM,MAG SULFATES 17.5-3.13G
1 SOLUTION, RECONSTITUTED, ORAL ORAL SEE ADMIN INSTRUCTIONS
Qty: 2 BOTTLE | Refills: 0 | Status: SHIPPED | OUTPATIENT
Start: 2021-02-24 | End: 2021-05-26

## 2021-03-01 DIAGNOSIS — Z11.59 ENCOUNTER FOR SCREENING FOR OTHER VIRAL DISEASES: ICD-10-CM

## 2021-03-01 LAB
SARS-COV-2 RNA RESP QL NAA+PROBE: NORMAL
SPECIMEN SOURCE: NORMAL

## 2021-03-01 PROCEDURE — U0003 INFECTIOUS AGENT DETECTION BY NUCLEIC ACID (DNA OR RNA); SEVERE ACUTE RESPIRATORY SYNDROME CORONAVIRUS 2 (SARS-COV-2) (CORONAVIRUS DISEASE [COVID-19]), AMPLIFIED PROBE TECHNIQUE, MAKING USE OF HIGH THROUGHPUT TECHNOLOGIES AS DESCRIBED BY CMS-2020-01-R: HCPCS | Performed by: INTERNAL MEDICINE

## 2021-03-01 PROCEDURE — U0005 INFEC AGEN DETEC AMPLI PROBE: HCPCS | Performed by: INTERNAL MEDICINE

## 2021-03-02 LAB
LABORATORY COMMENT REPORT: NORMAL
SARS-COV-2 RNA RESP QL NAA+PROBE: NEGATIVE
SPECIMEN SOURCE: NORMAL

## 2021-03-04 ENCOUNTER — HOSPITAL ENCOUNTER (OUTPATIENT)
Facility: AMBULATORY SURGERY CENTER | Age: 31
Discharge: HOME | End: 2021-03-04
Attending: INTERNAL MEDICINE | Admitting: INTERNAL MEDICINE
Payer: COMMERCIAL

## 2021-03-04 ENCOUNTER — ANESTHESIA EVENT (OUTPATIENT)
Dept: SURGERY | Facility: AMBULATORY SURGERY CENTER | Age: 31
End: 2021-03-04

## 2021-03-04 ENCOUNTER — ANESTHESIA (OUTPATIENT)
Dept: SURGERY | Facility: AMBULATORY SURGERY CENTER | Age: 31
End: 2021-03-04

## 2021-03-04 VITALS
TEMPERATURE: 96.5 F | HEART RATE: 72 BPM | RESPIRATION RATE: 16 BRPM | SYSTOLIC BLOOD PRESSURE: 122 MMHG | OXYGEN SATURATION: 99 % | DIASTOLIC BLOOD PRESSURE: 75 MMHG

## 2021-03-04 VITALS — HEART RATE: 88 BPM

## 2021-03-04 DIAGNOSIS — Z12.11 SPECIAL SCREENING FOR MALIGNANT NEOPLASMS, COLON: Primary | ICD-10-CM

## 2021-03-04 LAB — COLONOSCOPY: NORMAL

## 2021-03-04 PROCEDURE — 88305 TISSUE EXAM BY PATHOLOGIST: CPT | Mod: GC | Performed by: PATHOLOGY

## 2021-03-04 PROCEDURE — G8907 PT DOC NO EVENTS ON DISCHARG: HCPCS

## 2021-03-04 PROCEDURE — G8918 PT W/O PREOP ORDER IV AB PRO: HCPCS

## 2021-03-04 PROCEDURE — 45385 COLONOSCOPY W/LESION REMOVAL: CPT

## 2021-03-04 RX ORDER — PROPOFOL 10 MG/ML
INJECTION, EMULSION INTRAVENOUS CONTINUOUS PRN
Status: DISCONTINUED | OUTPATIENT
Start: 2021-03-04 | End: 2021-03-04

## 2021-03-04 RX ORDER — PROCHLORPERAZINE MALEATE 10 MG
10 TABLET ORAL EVERY 6 HOURS PRN
Status: DISCONTINUED | OUTPATIENT
Start: 2021-03-04 | End: 2021-03-05 | Stop reason: HOSPADM

## 2021-03-04 RX ORDER — ONDANSETRON 2 MG/ML
4 INJECTION INTRAMUSCULAR; INTRAVENOUS
Status: DISCONTINUED | OUTPATIENT
Start: 2021-03-04 | End: 2021-03-05 | Stop reason: HOSPADM

## 2021-03-04 RX ORDER — ONDANSETRON 4 MG/1
4 TABLET, ORALLY DISINTEGRATING ORAL EVERY 6 HOURS PRN
Status: DISCONTINUED | OUTPATIENT
Start: 2021-03-04 | End: 2021-03-05 | Stop reason: HOSPADM

## 2021-03-04 RX ORDER — NALOXONE HYDROCHLORIDE 0.4 MG/ML
0.2 INJECTION, SOLUTION INTRAMUSCULAR; INTRAVENOUS; SUBCUTANEOUS
Status: DISCONTINUED | OUTPATIENT
Start: 2021-03-04 | End: 2021-03-05 | Stop reason: HOSPADM

## 2021-03-04 RX ORDER — NALOXONE HYDROCHLORIDE 0.4 MG/ML
0.4 INJECTION, SOLUTION INTRAMUSCULAR; INTRAVENOUS; SUBCUTANEOUS
Status: DISCONTINUED | OUTPATIENT
Start: 2021-03-04 | End: 2021-03-05 | Stop reason: HOSPADM

## 2021-03-04 RX ORDER — PROPOFOL 10 MG/ML
INJECTION, EMULSION INTRAVENOUS PRN
Status: DISCONTINUED | OUTPATIENT
Start: 2021-03-04 | End: 2021-03-04

## 2021-03-04 RX ORDER — LIDOCAINE 40 MG/G
CREAM TOPICAL
Status: DISCONTINUED | OUTPATIENT
Start: 2021-03-04 | End: 2021-03-05 | Stop reason: HOSPADM

## 2021-03-04 RX ORDER — FLUMAZENIL 0.1 MG/ML
0.2 INJECTION, SOLUTION INTRAVENOUS
Status: SHIPPED | OUTPATIENT
Start: 2021-03-04 | End: 2021-03-04

## 2021-03-04 RX ORDER — ONDANSETRON 2 MG/ML
4 INJECTION INTRAMUSCULAR; INTRAVENOUS EVERY 6 HOURS PRN
Status: DISCONTINUED | OUTPATIENT
Start: 2021-03-04 | End: 2021-03-05 | Stop reason: HOSPADM

## 2021-03-04 RX ORDER — SODIUM CHLORIDE, SODIUM LACTATE, POTASSIUM CHLORIDE, CALCIUM CHLORIDE 600; 310; 30; 20 MG/100ML; MG/100ML; MG/100ML; MG/100ML
INJECTION, SOLUTION INTRAVENOUS CONTINUOUS
Status: DISCONTINUED | OUTPATIENT
Start: 2021-03-04 | End: 2021-03-05 | Stop reason: HOSPADM

## 2021-03-04 RX ORDER — LIDOCAINE HYDROCHLORIDE 20 MG/ML
INJECTION, SOLUTION INFILTRATION; PERINEURAL PRN
Status: DISCONTINUED | OUTPATIENT
Start: 2021-03-04 | End: 2021-03-04

## 2021-03-04 RX ADMIN — PROPOFOL 100 MG: 10 INJECTION, EMULSION INTRAVENOUS at 07:34

## 2021-03-04 RX ADMIN — PROPOFOL 100 MG: 10 INJECTION, EMULSION INTRAVENOUS at 07:36

## 2021-03-04 RX ADMIN — PROPOFOL 30 MG: 10 INJECTION, EMULSION INTRAVENOUS at 07:44

## 2021-03-04 RX ADMIN — LIDOCAINE HYDROCHLORIDE 60 MG: 20 INJECTION, SOLUTION INFILTRATION; PERINEURAL at 07:34

## 2021-03-04 RX ADMIN — SODIUM CHLORIDE, SODIUM LACTATE, POTASSIUM CHLORIDE, CALCIUM CHLORIDE: 600; 310; 30; 20 INJECTION, SOLUTION INTRAVENOUS at 07:24

## 2021-03-04 RX ADMIN — PROPOFOL 100 MG: 10 INJECTION, EMULSION INTRAVENOUS at 07:39

## 2021-03-04 RX ADMIN — PROPOFOL 150 MCG/KG/MIN: 10 INJECTION, EMULSION INTRAVENOUS at 07:33

## 2021-03-04 RX ADMIN — SODIUM CHLORIDE, SODIUM LACTATE, POTASSIUM CHLORIDE, CALCIUM CHLORIDE: 600; 310; 30; 20 INJECTION, SOLUTION INTRAVENOUS at 07:17

## 2021-03-04 NOTE — ANESTHESIA PREPROCEDURE EVALUATION
Anesthesia Pre-Procedure Evaluation    Patient: Onel White   MRN: [id number] : 1990        Preoperative Diagnosis: Ulcerative pancolitis with rectal bleeding (H) [K51.011]   Procedure : Procedure(s):  COLONOSCOPY, WITH CO2 INSUFFLATION     Past Medical History:   Diagnosis Date     Abdominal pain, generalized      Anemia, unspecified      Diarrhea      Other acne      Ulcerative (chronic) enterocolitis (H) 8/3/2009     Ulcerative colitis     follow[facility] by [facility], [facility]     Ulcerative pancolitis with rectal bleeding (H) 2020     Uncomplicated asthma       Past Surgical History:   Procedure Laterality Date     C APPENDECTOMY,RUPT APPENDX+ABSCESS      peritonitis     COLONOSCOPY       COLONOSCOPY N/A 2019    Procedure: Combined Colonoscopy, Single Or Multiple Biopsy/Polypectomy By Biopsy;  Surgeon: Liang Hancock MD;  Location: [facility]     COLONOSCOPY N/A 2020    Procedure: Colonoscopy, With Polypectomy And Biopsy;  Surgeon: Liang Hancock MD;  Location: [facility]     COLONOSCOPY WITH CO2 INSUFFLATION N/A 2019    Procedure: COLONOSCOPY WITH CO2 INSUFFLATION;  Surgeon: Liang Hancock MD;  Location: [facility]     COLONOSCOPY WITH CO2 INSUFFLATION N/A 2020    Procedure: COLONOSCOPY, WITH CO2 INSUFFLATION;  Surgeon: Liang Hanocck MD;  Location: [facility]      Allergies   Allergen Reactions     Wheat       Social History     Tobacco Use     Smoking status: Former Smoker     Types: Cigarettes     Quit date: 2012     Years since quittin.7     Smokeless tobacco: Never Used   Substance Use Topics     Alcohol use: Yes     Alcohol/week: 20.0 standard drinks     Types: 20 Standard drinks or equivalent per week     Comment: one after work every day and weekends 5-6 each weekend day      Wt Readings from Last 1 Encounters:   21 100.2 kg (221 lb)        Anesthesia Evaluation            ROS/MED HX  ENT/Pulmonary:  - neg  pulmonary ROS   (+) asthma     Neurologic:  - neg neurologic ROS     Cardiovascular:  - neg cardiovascular ROS     METS/Exercise Tolerance:     Hematologic:  - neg hematologic  ROS   (+) anemia,     Musculoskeletal:  - neg musculoskeletal ROS     GI/Hepatic:  - neg GI/hepatic ROS   (+) Inflammatory bowel disease,     Renal/Genitourinary:  - neg Renal ROS     Endo:  - neg endo ROS     Psychiatric/Substance Use:  - neg psychiatric ROS   (+) alcohol abuse Recreational drug usage: Cannabis.    Infectious Disease:  - neg infectious disease ROS     Malignancy:  - neg malignancy ROS     Other:  - neg other ROS          Physical Exam    Airway  airway exam normal      Mallampati: II   TM distance: > 3 FB   Neck ROM: full   Mouth opening: > 3 cm    Respiratory Devices and Support         Dental  no notable dental history         Cardiovascular          Rhythm and rate: regular and normal     Pulmonary   pulmonary exam normal        breath sounds clear to auscultation           OUTSIDE LABS:  CBC:   Lab Results   Component Value Date    WBC 6.9 02/12/2021    WBC 6.9 10/16/2020    HGB 16.1 02/12/2021    HGB 15.4 10/16/2020    HCT 49.2 02/12/2021    HCT 48.3 10/16/2020     02/12/2021     10/16/2020     BMP:   Lab Results   Component Value Date     08/31/2020     06/22/2020    POTASSIUM 3.7 08/31/2020    POTASSIUM 4.0 06/22/2020    CHLORIDE 103 08/31/2020    CHLORIDE 104 06/22/2020    CO2 28 08/31/2020    CO2 29 06/22/2020    BUN 10 08/31/2020    BUN 11 06/22/2020    CR 0.91 08/31/2020    CR 0.82 06/22/2020    GLC 70 08/31/2020     (H) 06/22/2020     COAGS:   Lab Results   Component Value Date    PTT 30 04/13/2009    INR 1.00 04/13/2009     POC: No results found for: BGM, HCG, HCGS  HEPATIC:   Lab Results   Component Value Date    ALBUMIN 4.4 02/12/2021    PROTTOTAL 8.2 02/12/2021    ALT 91 (H) 02/12/2021    AST 46 (H) 02/12/2021    GGT 29 11/10/2010    ALKPHOS 135 02/12/2021    BILITOTAL 0.5  02/12/2021     OTHER:   Lab Results   Component Value Date    SASHA 9.0 08/31/2020    PHOS 4.6 11/09/2009    LIPASE 149 06/15/2020    TSH 2.01 01/15/2008    CRP <2.9 02/12/2021    SED 5 02/12/2021       Anesthesia Plan    ASA Status:  2   NPO Status:  NPO Appropriate    Anesthesia Type: MAC.     - Reason for MAC: immobility needed, straight local not clinically adequate   Induction: Intravenous.   Maintenance: TIVA.        Consents    Anesthesia Plan(s) and associated risks, benefits, and realistic alternatives discussed. Questions answered and patient/representative(s) expressed understanding.     - Discussed with:  Patient      - Extended Intubation/Ventilatory Support Discussed: No.      - Patient is DNR/DNI Status: No    Use of blood products discussed: No .     Postoperative Care    Pain management: IV analgesics, Oral pain medications.   PONV prophylaxis: Ondansetron (or other 5HT-3)     Comments:                Maurice Medina MD

## 2021-03-04 NOTE — ANESTHESIA CARE TRANSFER NOTE
Patient: Onel White    Procedure(s):  COLONOSCOPY, WITH CO2 INSUFFLATION  Colonoscopy, With Polypectomy And Biopsy    Diagnosis: Ulcerative pancolitis with rectal bleeding (H) [K51.011]  Diagnosis Additional Information: No value filed.    Anesthesia Type:   MAC     Note:    Oropharynx: oropharynx clear of all foreign objects and spontaneously breathing  Level of Consciousness: drowsy  Oxygen Supplementation: face mask    Independent Airway: airway patency satisfactory and stable  Dentition: dentition unchanged  Vital Signs Stable: post-procedure vital signs reviewed and stable  Report to RN Given: handoff report given  Patient transferred to: Phase II    Handoff Report: Identifed the Patient, Identified the Reponsible Provider, Reviewed the pertinent medical history, Discussed the surgical course, Reviewed Intra-OP anesthesia mangement and issues during anesthesia, Set expectations for post-procedure period and Allowed opportunity for questions and acknowledgement of understanding      Vitals: (Last set prior to Anesthesia Care Transfer)  CRNA VITALS  3/4/2021 0737 - 3/4/2021 0811      3/4/2021             Pulse:  90    SpO2:  95 %        Electronically Signed By: CECIL Cyr CRNA  March 4, 2021  8:11 AM

## 2021-03-04 NOTE — H&P
ENDOSCOPY PRE-SEDATION H&P FOR OUTPATIENT PROCEDURES    Onel White  [id number]  1990    Procedure: colonoscopy with MAC    Pre-procedure diagnosis: ulcerative colitis    Past medical history:   Past Medical History:   Diagnosis Date     Abdominal pain, generalized      Anemia, unspecified      Diarrhea      Other acne      Ulcerative (chronic) enterocolitis (H) 8/3/2009     Ulcerative colitis     followed by [facility], [facility]     Ulcerative pancolitis with rectal bleeding (H) 6/26/2020     Uncomplicated asthma        Past surgical history:   Past Surgical History:   Procedure Laterality Date     C APPENDECTOMY,RUPT APPENDX+ABSCESS  2006    peritonitis     COLONOSCOPY       COLONOSCOPY N/A 4/17/2019    Procedure: Combined Colonoscopy, Single Or Multiple Biopsy/Polypectomy By Biopsy;  Surgeon: Liang Hancock MD;  Location: [facility]     COLONOSCOPY N/A 6/26/2020    Procedure: Colonoscopy, With Polypectomy And Biopsy;  Surgeon: Liang Hancock MD;  Location: [facility]     COLONOSCOPY WITH CO2 INSUFFLATION N/A 4/17/2019    Procedure: COLONOSCOPY WITH CO2 INSUFFLATION;  Surgeon: Liang Hancock MD;  Location: [facility]     COLONOSCOPY WITH CO2 INSUFFLATION N/A 6/26/2020    Procedure: COLONOSCOPY, WITH CO2 INSUFFLATION;  Surgeon: Liang Hancock MD;  Location: [facility] OR       Current Outpatient Medications   Medication     albuterol (PROAIR HFA/PROVENTIL HFA/VENTOLIN HFA) 108 (90 Base) MCG/ACT inhaler     albuterol (PROVENTIL) (2.5 MG/3ML) 0.083% neb solution     bisacodyl (DULCOLAX) 5 MG EC tablet     fluticasone-salmeterol (ADVAIR-HFA) 115-21 MCG/ACT inhaler     Na Sulfate-K Sulfate-Mg Sulf (SUPREP BOWEL PREP KIT) solution     polyethylene glycol (GOLYTELY) 236 g suspension     predniSONE (DELTASONE) 20 MG tablet     Simethicone 125 MG TABS     Simethicone 125 MG TABS     UNABLE TO FIND     escitalopram (LEXAPRO) 20 MG tablet     Current Facility-Administered  Medications   Medication     lactated ringers infusion     lidocaine (LMX4) kit     lidocaine 1 % 0.1-1 mL     ondansetron (ZOFRAN) injection 4 mg     sodium chloride (PF) 0.9% PF flush 3 mL     sodium chloride (PF) 0.9% PF flush 3 mL       Allergies   Allergen Reactions     Wheat        History of Anesthesia/Sedation Problems: no    Physical Exam:    Mental status: alert or interactive  Heart: Normal  Lung: Normal  Assessment of patient's airway: Normal  Other as pertinent for procedure: None     ASA Score: See Provation note    Mallampati score:  I - Faucial pillars, soft palate, and uvula are visible    Assessment/Plan:     The patient is an appropriate candidate to receive sedation.    Informed consent was discussed with the patient/family, including the risks, benefits, potential complications and any alternative options associated with sedation.    Patient assessment completed just prior to sedation and while under constant observation by the provider. Condition determined to be adequate for proceeding with sedation.    The specific risks for the procedure were discussed with the patient at the time of informed consent and include but are not limited to perforation which could require surgery, missing significant neoplasm or lesion, hemorrhage and adverse sedative complication.      Liang Hancock MD

## 2021-03-04 NOTE — ANESTHESIA POSTPROCEDURE EVALUATION
Patient: Onel White    Procedure(s):  COLONOSCOPY, WITH CO2 INSUFFLATION  Colonoscopy, With Polypectomy And Biopsy    Diagnosis:Ulcerative pancolitis with rectal bleeding (H) [K51.011]  Diagnosis Additional Information: No value filed.    Anesthesia Type:  MAC    Note:  Disposition: Outpatient   Postop Pain Control: Uneventful            Sign Out: Well controlled pain   PONV: No   Neuro/Psych: Uneventful            Sign Out: Acceptable/Baseline neuro status   Airway/Respiratory: Uneventful            Sign Out: Acceptable/Baseline resp. status   CV/Hemodynamics: Uneventful            Sign Out: Acceptable CV status   Other NRE: NONE   DID A NON-ROUTINE EVENT OCCUR? No         Last vitals:  Vitals:    03/04/21 0820 03/04/21 0830 03/04/21 0846   BP: 102/53 112/72 122/75   Pulse: 76 72 72   Resp: 16 16 16   Temp:   35.8  C (96.5  F)   SpO2: 98% 99% 99%       Last vitals prior to Anesthesia Care Transfer:  CRNA VITALS  3/4/2021 0737 - 3/4/2021 0837      3/4/2021             Pulse:  90    SpO2:  95 %          Electronically Signed By: Maurice Medina MD  March 4, 2021  3:57 PM

## 2021-03-06 PROBLEM — D84.9 IMMUNOSUPPRESSION (H): Status: RESOLVED | Noted: 2021-02-17 | Resolved: 2021-03-06

## 2021-03-06 RX ORDER — ESCITALOPRAM OXALATE 20 MG/1
20 TABLET ORAL DAILY
Qty: 30 TABLET | Refills: 0 | Status: SHIPPED | OUTPATIENT
Start: 2021-03-06 | End: 2021-05-26

## 2021-03-08 LAB — COPATH REPORT: NORMAL

## 2021-04-09 ENCOUNTER — INFUSION THERAPY VISIT (OUTPATIENT)
Dept: INFUSION THERAPY | Facility: CLINIC | Age: 31
End: 2021-04-09
Payer: COMMERCIAL

## 2021-04-09 VITALS
TEMPERATURE: 98.4 F | BODY MASS INDEX: 29.63 KG/M2 | SYSTOLIC BLOOD PRESSURE: 116 MMHG | RESPIRATION RATE: 18 BRPM | HEART RATE: 79 BPM | DIASTOLIC BLOOD PRESSURE: 77 MMHG | WEIGHT: 222.1 LBS | OXYGEN SATURATION: 97 %

## 2021-04-09 DIAGNOSIS — K51.011 ULCERATIVE PANCOLITIS WITH RECTAL BLEEDING (H): Primary | ICD-10-CM

## 2021-04-09 PROCEDURE — 99207 PR NO CHARGE LOS: CPT

## 2021-04-09 PROCEDURE — 96413 CHEMO IV INFUSION 1 HR: CPT | Performed by: INTERNAL MEDICINE

## 2021-04-09 RX ORDER — HEPARIN SODIUM,PORCINE 10 UNIT/ML
5 VIAL (ML) INTRAVENOUS
Status: CANCELLED | OUTPATIENT
Start: 2021-06-04

## 2021-04-09 RX ORDER — HEPARIN SODIUM (PORCINE) LOCK FLUSH IV SOLN 100 UNIT/ML 100 UNIT/ML
5 SOLUTION INTRAVENOUS
Status: CANCELLED | OUTPATIENT
Start: 2021-06-04

## 2021-04-09 RX ADMIN — Medication 250 ML: at 10:49

## 2021-04-09 NOTE — PROGRESS NOTES
Infusion Nursing Note:  Onel White presents today for Entyvio.    Patient seen by provider today: No   present during visit today: Not Applicable.    Note: N/A.  Patient declined the covid-19 test.    Intravenous Access:  Peripheral IV placed.    Treatment Conditions:  Biological Infusion Checklist:  ~~~ NOTE: If the patient answers yes to any of the questions below, hold the infusion and contact ordering provider or on-call provider.    1. Have you recently had an elevated temperature, fever, chills, productive cough, coughing for 3 weeks or longer or hemoptysis, abnormal vital signs, night sweats,  chest pain or have you noticed a decrease in your appetite, unexplained weight loss or fatigue? No  2. Do you have any open wounds or new incisions? No  3. Do you have any recent or upcoming hospitalizations, surgeries or dental procedures? No  4. Do you currently have or recently have had any signs of illness or infection or are you on any antibiotics? No  5. Have you had any new, sudden or worsening abdominal pain? No  6. Have you or anyone in your household received a live vaccination in the past 4 weeks? Please note:  No live vaccines while on biologic/chemotherapy until 6 months after the last treatment.  Patient can receive the flu vaccine (shot only) and the pneumovax.  It is optimal for the patient to get these vaccines mid cycle, but they can be given at any time as long as it is not on the day of the infusion. No  7. Have you recently been diagnosed with any new nervous system diseases (ie. Multiple sclerosis, Guillain [place], seizures, neurological changes) or cancer diagnosis? No  8. Are you on any form of radiation or chemotherapy? No  9. Are you pregnant or breast feeding or do you have plans of pregnancy in the future? No  10. Have you been having any signs of worsening depression or suicidal ideations?  (benlysta only) No  11. Have there been any other new onset medical symptoms?  No        Post Infusion Assessment:  Patient tolerated infusion without incident.       Discharge Plan:   Patient discharged in stable condition accompanied by: self.    Fani Kelly RN

## 2021-05-26 ENCOUNTER — VIRTUAL VISIT (OUTPATIENT)
Dept: FAMILY MEDICINE | Facility: CLINIC | Age: 31
End: 2021-05-26
Payer: COMMERCIAL

## 2021-05-26 DIAGNOSIS — F41.1 GENERALIZED ANXIETY DISORDER: ICD-10-CM

## 2021-05-26 DIAGNOSIS — J45.30 MILD PERSISTENT ASTHMA WITHOUT COMPLICATION: Primary | ICD-10-CM

## 2021-05-26 PROCEDURE — 99214 OFFICE O/P EST MOD 30 MIN: CPT | Mod: 95 | Performed by: FAMILY MEDICINE

## 2021-05-26 PROCEDURE — 96127 BRIEF EMOTIONAL/BEHAV ASSMT: CPT | Performed by: FAMILY MEDICINE

## 2021-05-26 RX ORDER — ALBUTEROL SULFATE 0.83 MG/ML
2.5 SOLUTION RESPIRATORY (INHALATION) 3 TIMES DAILY PRN
Qty: 180 ML | Refills: 11 | Status: SHIPPED | OUTPATIENT
Start: 2021-05-26

## 2021-05-26 RX ORDER — ALBUTEROL SULFATE 90 UG/1
2 AEROSOL, METERED RESPIRATORY (INHALATION) EVERY 4 HOURS PRN
Qty: 18 G | Refills: 11 | Status: SHIPPED | OUTPATIENT
Start: 2021-05-26 | End: 2022-06-06

## 2021-05-26 ASSESSMENT — PATIENT HEALTH QUESTIONNAIRE - PHQ9: SUM OF ALL RESPONSES TO PHQ QUESTIONS 1-9: 14

## 2021-05-26 NOTE — PROGRESS NOTES
Onel is a 30 year old who is being evaluated via a billable telephone visit.      What phone number would you like to be contacted at? [phone]  How would you like to obtain your AVS? Mikael    Assessment & Plan     Mild persistent asthma without complication  Not fully controlled with the Advair.  Did well with the Breo.  Discussed with him about the nature of the condition and its treatment options.  Recommended to increase the Advair dose but he declined.  He would like to be back on the Breo.  Restarted the Breo.  Encouraged to stop smoking both marijuana and tobacco.  Recommended over-the-counter antihistamine as needed for allergy symptoms.  Symptoms need to be seen or call in discussed.  ER if develops breathing difficulty.    - albuterol (PROVENTIL) (2.5 MG/3ML) 0.083% neb solution; Take 1 vial (2.5 mg) by nebulization 3 times daily as needed for shortness of breath / dyspnea or wheezing (space out from inhaler by 4 hrs)  - albuterol (PROAIR HFA/PROVENTIL HFA/VENTOLIN HFA) 108 (90 Base) MCG/ACT inhaler; Inhale 2 puffs into the lungs every 4 hours as needed for shortness of breath / dyspnea or wheezing  - fluticasone-vilanterol (BREO ELLIPTA) 200-25 MCG/INH inhaler; INHALE ONE PUFF BY MOUTH ONCE DAILY    Generalized anxiety disorder  Also is known to have depression.  He feels his depression is controlled and has no concern about it.  No suicidal or homicidal ideation.  No hallucination.  Been having a lot of anxiety.  Took himself of the Lexapro for couple months due to its side effect.  Has not been on the medication for at least 2-3 months.  He would like to try the Zoloft for his depression and anxiety which was prescribed.  Potential side effect discussed.  Follow-up in a month virtually, earlier if any concerns or question.  Symptoms that need to be seen or call in also discussed.     - sertraline (ZOLOFT) 50 MG tablet; Take 1 tablet (50 mg) by mouth daily      27 minutes spent on the date of the  encounter doing chart review, patient visit and documentation        Depression Screening Follow Up    PHQ 5/26/2021   PHQ-9 Total Score 14   Q9: Thoughts of better off dead/self-harm past 2 weeks Not at all       Follow Up Actions Taken  Crisis resource information provided in After Visit Summary  medication prescribed         Return in about 1 month (around 6/26/2021) for folow up depression/anxiety.    Nehemiah Hernandez Mai, MD  [facility]    Cris Sykes is a 30 year old who presents for the following health issues     HPI     Asthma Follow-Up    Was ACT completed today?  No      Do you have a cough?  No    Are you experiencing any wheezing in your chest?  YES    Do you have any shortness of breath?  YES     How often are you using a short-acting (rescue) inhaler or nebulizer, such as Albuterol?  Every 4 hours    How many days per week do you miss taking your asthma controller medication?  Patient has been going back and forth between Advir and Breo. He would like to switch back to Breo    Please describe any recent triggers for your asthma: smoke and pollens    Have you had any Emergency Room Visits, Urgent Care Visits, or Hospital Admissions since your last office visit?  No      How many servings of fruits and vegetables do you eat daily?  2-3    On average, how many sweetened beverages do you drink each day (Examples: soda, juice, sweet tea, etc.  Do NOT count diet or artificially sweetened beverages)?   4    How many days per week do you exercise enough to make your heart beat faster? 5    How many minutes a day do you exercise enough to make your heart beat faster? 30 - 60    How many days per week do you miss taking your medication? 0        Review of Systems   Constitutional, HEENT, cardiovascular, pulmonary, gi and gu systems are negative, except as otherwise noted.      Objective         Onel was seen today for asthma and depression follow-up.  He has asthma for years and has been  controlled pretty good with Breo Ellipta.  He uses rescue inhaler rarely when he is on the Breo.  Due to the cost of the Breo, at the last visit, he was switched to Advair inhaler.  He feels the Advair works only partially.  Since starting the Advair, he uses the rescue inhaler 2-3 times a day.  He feels that he cannot take a deep breath and his asthma is not controlled with the Advair.  He would like to go back to the Breo.  He understand the cost and is willing to work with the pharmacy; he does not want to try anything else.  Continues to smoke both marijuana and tobacco.  Been having some allergy symptoms but denies of having any breathing difficulty.  Little coughing and wheezing but very little.  He used the prednisone as needed for asthma exacerbations, has not felt the need to use it lately.  No dyspnea or orthopnea.    He also has depression and anxiety and was on Lexapro.  The at the last visit, the Lexapro dose was increased to 20 mg which took for couple weeks but had to stop because he felt significant worsening of anxiety with it.  He would like to try the Zoloft.  His girlfriend has been doing very well with the Zoloft and he would like to give it a try.  Not feeling depressed, his main concern is the anxiety.  No suicidal or homicidal ideation.  No hallucination.  No alcohol or drug except of the marijuana.    Vitals:  No vitals were obtained today due to virtual visit.    Physical Exam   healthy, alert and no distress  PSYCH: Alert and oriented times 3; coherent speech, normal   rate and volume, able to articulate logical thoughts, able   to abstract reason, no tangential thoughts, no hallucinations   or delusions  His affect is normal  RESP: No cough, no audible wheezing, able to talk in full sentences  Remainder of exam unable to be completed due to telephone visits    No results found for any visits on 05/26/21.            Phone call duration: 8 minutes  Started time 10:07 AM  Ended time 10:15  AM

## 2021-05-26 NOTE — LETTER
My Asthma Action Plan    Name: Onel White   YOB: 1990  Date: 5/26/2021   My doctor: Nehemiah Hernandez Mai, MD   My clinic: [facility]        My Control Medicine: Fluticasone furoate + vilanterol (Breo Ellipta)  -  100/25mcg daily  My Rescue Medicine: Albuterol nebulizer solution Q4H prn  Albuterol (Proair/Ventolin/Proventil HFA) 2-4 puffs EVERY 4 HOURS as needed. Use a spacer if recommended by your provider.  My Oral Steroid Medicine: Prenisone My Asthma Severity:   Mild Persistent  Know your asthma triggers: smoke, upper respiratory infections, pollens and exercise or sports  smoke  pollens            GREEN ZONE   Good Control    I feel good    No cough or wheeze    Can work, sleep and play without asthma symptoms       Take your asthma control medicine every day.     1. If exercise triggers your asthma, take your rescue medication    15 minutes before exercise or sports, and    During exercise if you have asthma symptoms  2. Spacer to use with inhaler: If you have a spacer, make sure to use it with your inhaler             YELLOW ZONE Getting Worse  I have ANY of these:    I do not feel good    Cough or wheeze    Chest feels tight    Wake up at night   1. Keep taking your Green Zone medications  2. Start taking your rescue medicine:    every 20 minutes for up to 1 hour. Then every 4 hours for 24-48 hours.  3. If you stay in the Yellow Zone for more than 12-24 hours, contact your doctor.  4. If you do not return to the Green Zone in 12-24 hours or you get worse, start taking your oral steroid medicine if prescribed by your provider.           RED ZONE Medical Alert - Get Help  I have ANY of these:    I feel awful    Medicine is not helping    Breathing getting harder    Trouble walking or talking    Nose opens wide to breathe       1. Take your rescue medicine NOW  2. If your provider has prescribed an oral steroid medicine, start taking it NOW  3. Call your doctor NOW  4. If you  are still in the Red Zone after 20 minutes and you have not reached your doctor:    Take your rescue medicine again and    Call 911 or go to the emergency room right away    See your regular doctor within 2 weeks of an Emergency Room or Urgent Care visit for follow-up treatment.          Annual Reminders:  Meet with Asthma Educator,  Flu Shot in the Fall, consider Pneumonia Vaccination for patients with asthma (aged 19 and older).    Pharmacy:    [facility] [address] [facility] [id number] - [place] [id number] [address] [place] [address]    Electronically signed by Nehemiah Hernandez Mai, MD   Date: 05/26/21                      Asthma Triggers  How To Control Things That Make Your Asthma Worse    Triggers are things that make your asthma worse.  Look at the list below to help you find your triggers and what you can do about them.  You can help prevent asthma flare-ups by staying away from your triggers.      Trigger                                                          What you can do   Cigarette Smoke  Tobacco smoke can make asthma worse. Do not allow smoking in your home, car or around you.  Be sure no one smokes at a child s day care or school.  If you smoke, ask your health care provider for ways to help you quit.  Ask family members to quit too.  Ask your health care provider for a referral to Quit Plan to help you quit smoking, or call 9-179-112-PLAN.     Colds, Flu, Bronchitis  These are common triggers of asthma. Wash your hands often.  Don t touch your eyes, nose or mouth.  Get a flu shot every year.     Dust Mites  These are tiny bugs that live in cloth or carpet. They are too small to see. Wash sheets and blankets in hot water every week.   Encase pillows and mattress in dust mite proof covers.  Avoid having carpet if you can. If you have carpet, vacuum weekly.   Use a dust mask and HEPA vacuum.   Pollen and Outdoor Mold  Some people are allergic to trees, grass, or weed pollen, or molds.  Try to keep your windows closed.  Limit time out doors when pollen count is high.   Ask you health care provider about taking medicine during allergy season.     Animal Dander  Some people are allergic to skin flakes, urine or saliva from pets with fur or feathers. Keep pets with fur or feathers out of your home.    If you can t keep the pet outdoors, then keep the pet out of your bedroom.  Keep the bedroom door closed.  Keep pets off cloth furniture and away from stuffed toys.     Mice, Rats, and Cockroaches   Some people are allergic to the waste from these pests.   Cover food and garbage.  Clean up spills and food crumbs.  Store grease in the refrigerator.   Keep food out of the bedroom.   Indoor Mold  This can be a trigger if your home has high moisture. Fix leaking faucets, pipes, or other sources of water.   Clean moldy surfaces.  Dehumidify basement if it is damp and smelly.   Smoke, Strong Odors, and Sprays  These can reduce air quality. Stay away from strong odors and sprays, such as perfume, powder, hair spray, paints, smoke incense, paint, cleaning products, candles and new carpet.   Exercise or Sports  Some people with asthma have this trigger. Be active!  Ask your doctor about taking medicine before sports or exercise to prevent symptoms.    Warm up for 5-10 minutes before and after sports or exercise.     Other Triggers of Asthma  Cold air:  Cover your nose and mouth with a scarf.  Sometimes laughing or crying can be a trigger.  Some medicines and food can trigger asthma.

## 2021-05-26 NOTE — LETTER
My Depression Action Plan  Name: Onel White   Date of Birth 1990  Date: 5/26/2021    My doctor: No Ref-Primary, Physician   My clinic: [address] [address] [phone]          GREEN    ZONE   Good Control    What it looks like:     Things are going generally well. You have normal ups and downs. You may even feel depressed from time to time, but bad moods usually last less than a day.   What you need to do:  1. Continue to care for yourself (see self care plan)  2. Check your depression survival kit and update it as needed  3. Follow your physician s recommendations including any medication.  4. Do not stop taking medication unless you consult with your physician first.           YELLOW         ZONE Getting Worse    What it looks like:     Depression is starting to interfere with your life.     It may be hard to get out of bed; you may be starting to isolate yourself from others.    Symptoms of depression are starting to last most all day and this has happened for several days.     You may have suicidal thoughts but they are not constant.   What you need to do:     1. Call your care team. Your response to treatment will improve if you keep your care team informed of your progress. Yellow periods are signs an adjustment may need to be made.     2. Continue your self-care.  Just get dressed and ready for the day.  Don't give yourself time to talk yourself out of it.    3. Talk to someone in your support network.    4. Open up your Depression Self-Care Plan/Wellness Kit.           RED    ZONE Medical Alert - Get Help    What it looks like:     Depression is seriously interfering with your life.     You may experience these or other symptoms: You can t get out of bed most days, can t work or engage in other necessary activities, you have trouble taking care of basic hygiene, or basic responsibilities, thoughts of suicide or death that  will not go away, self-injurious behavior.     What you need to do:  1. Call your care team and request a same-day appointment. If they are not available (weekends or after hours) call your local crisis line, emergency room or 911.          Depression Self-Care Plan / Wellness Kit    Many people find that medication and therapy are helpful treatments for managing depression. In addition, making small changes to your everyday life can help to boost your mood and improve your wellbeing. Below are some tips for you to consider. Be sure to talk with your medical provider and/or behavioral health consultant if your symptoms are worsening or not improving.     Sleep   Sleep hygiene  means all of the habits that support good, restful sleep. It includes maintaining a consistent bedtime and wake time, using your bedroom only for sleeping or sex, and keeping the bedroom dark and free of distractions like a computer, smartphone, or television.     Develop a Healthy Routine  Maintain good hygiene. Get out of bed in the morning, make your bed, brush your teeth, take a shower, and get dressed. Don t spend too much time viewing media that makes you feel stressed. Find time to relax each day.    Exercise  Get some form of exercise every day. This will help reduce pain and release endorphins, the  feel good  chemicals in your brain. It can be as simple as just going for a walk or doing some gardening, anything that will get you moving.      Diet  Strive to eat healthy foods, including fruits and vegetables. Drink plenty of water. Avoid excessive sugar, caffeine, alcohol, and other mood-altering substances.     Stay Connected with Others  Stay in touch with friends and family members.    Manage Your Mood  Try deep breathing, massage therapy, biofeedback, or meditation. Take part in fun activities when you can. Try to find something to smile about each day.     Psychotherapy  Be open to working with a therapist if your provider  recommends it.     Medication  Be sure to take your medication as prescribed. Most anti-depressants need to be taken every day. It usually takes several weeks for medications to work. Not all medicines work for all people. It is important to follow-up with your provider to make sure you have a treatment plan that is working for you. Do not stop your medication abruptly without first discussing it with your provider.    Crisis Resources   These hotlines are for both adults and children. They and are open 24 hours a day, 7 days a week unless noted otherwise.      National Suicide Prevention Lifeline   6-005-441-IABZ (1345)      Crisis Text Line    www.crisistextline.org  Text HOME to 957172 from anywhere in the [place], anytime, about any type of crisis. A live, trained crisis counselor will receive the text and respond quickly.      [facility] Lifeline for LGBTQ Youth  A national crisis intervention and suicide lifeline for LGBTQ youth under 25. Provides a safe place to talk without judgement. Call 1-388.435.7091; text START to 403705 or visit www.thetrevorproject.org to talk to a trained counselor.      For [facility] crisis numbers, visit the [facility] website at:  https://[place].gov/dhs/people-we-serve/adults/health-care/mental-health/resources/crisis-contacts.jsp

## 2021-06-04 ENCOUNTER — TELEPHONE (OUTPATIENT)
Dept: GASTROENTEROLOGY | Facility: CLINIC | Age: 31
End: 2021-06-04

## 2021-06-04 ENCOUNTER — INFUSION THERAPY VISIT (OUTPATIENT)
Dept: INFUSION THERAPY | Facility: CLINIC | Age: 31
End: 2021-06-04
Payer: COMMERCIAL

## 2021-06-04 VITALS
SYSTOLIC BLOOD PRESSURE: 143 MMHG | WEIGHT: 212 LBS | RESPIRATION RATE: 16 BRPM | TEMPERATURE: 98.1 F | BODY MASS INDEX: 28.28 KG/M2 | DIASTOLIC BLOOD PRESSURE: 78 MMHG | OXYGEN SATURATION: 98 % | HEART RATE: 65 BPM

## 2021-06-04 DIAGNOSIS — K51.011 ULCERATIVE PANCOLITIS WITH RECTAL BLEEDING (H): Primary | ICD-10-CM

## 2021-06-04 LAB
ALBUMIN SERPL-MCNC: 3.9 G/DL (ref 3.4–5)
ALP SERPL-CCNC: 143 U/L (ref 40–150)
ALT SERPL W P-5'-P-CCNC: 84 U/L (ref 0–70)
AST SERPL W P-5'-P-CCNC: 52 U/L (ref 0–45)
BASOPHILS # BLD AUTO: 0 10E9/L (ref 0–0.2)
BASOPHILS NFR BLD AUTO: 1 %
BILIRUB DIRECT SERPL-MCNC: 0.2 MG/DL (ref 0–0.2)
BILIRUB SERPL-MCNC: 0.7 MG/DL (ref 0.2–1.3)
CRP SERPL-MCNC: 13.4 MG/L (ref 0–8)
DIFFERENTIAL METHOD BLD: ABNORMAL
EOSINOPHIL # BLD AUTO: 0.4 10E9/L (ref 0–0.7)
EOSINOPHIL NFR BLD AUTO: 12 %
ERYTHROCYTE [DISTWIDTH] IN BLOOD BY AUTOMATED COUNT: 11.9 % (ref 10–15)
ERYTHROCYTE [SEDIMENTATION RATE] IN BLOOD BY WESTERGREN METHOD: 9 MM/H (ref 0–15)
HCT VFR BLD AUTO: 41.9 % (ref 40–53)
HGB BLD-MCNC: 14.1 G/DL (ref 13.3–17.7)
LYMPHOCYTES # BLD AUTO: 1.6 10E9/L (ref 0.8–5.3)
LYMPHOCYTES NFR BLD AUTO: 48 %
MCH RBC QN AUTO: 30.3 PG (ref 26.5–33)
MCHC RBC AUTO-ENTMCNC: 33.7 G/DL (ref 31.5–36.5)
MCV RBC AUTO: 90 FL (ref 78–100)
MONOCYTES # BLD AUTO: 1 10E9/L (ref 0–1.3)
MONOCYTES NFR BLD AUTO: 28 %
NEUTROPHILS # BLD AUTO: 0.4 10E9/L (ref 1.6–8.3)
NEUTROPHILS NFR BLD AUTO: 11 %
PLATELET # BLD AUTO: 259 10E9/L (ref 150–450)
PLATELET # BLD EST: ABNORMAL 10*3/UL
PROT SERPL-MCNC: 7.4 G/DL (ref 6.8–8.8)
RBC # BLD AUTO: 4.65 10E12/L (ref 4.4–5.9)
RBC MORPH BLD: NORMAL
VARIANT LYMPHS BLD QL SMEAR: PRESENT
WBC # BLD AUTO: 3.4 10E9/L (ref 4–11)

## 2021-06-04 PROCEDURE — 96413 CHEMO IV INFUSION 1 HR: CPT | Performed by: INTERNAL MEDICINE

## 2021-06-04 PROCEDURE — 36415 COLL VENOUS BLD VENIPUNCTURE: CPT | Performed by: INTERNAL MEDICINE

## 2021-06-04 PROCEDURE — 99207 PR NO CHARGE LOS: CPT

## 2021-06-04 PROCEDURE — 80076 HEPATIC FUNCTION PANEL: CPT | Performed by: INTERNAL MEDICINE

## 2021-06-04 PROCEDURE — 85025 COMPLETE CBC W/AUTO DIFF WBC: CPT | Performed by: INTERNAL MEDICINE

## 2021-06-04 PROCEDURE — 85652 RBC SED RATE AUTOMATED: CPT | Performed by: INTERNAL MEDICINE

## 2021-06-04 PROCEDURE — 86140 C-REACTIVE PROTEIN: CPT | Performed by: INTERNAL MEDICINE

## 2021-06-04 RX ORDER — HEPARIN SODIUM (PORCINE) LOCK FLUSH IV SOLN 100 UNIT/ML 100 UNIT/ML
5 SOLUTION INTRAVENOUS
Status: CANCELLED | OUTPATIENT
Start: 2021-07-30

## 2021-06-04 RX ORDER — HEPARIN SODIUM,PORCINE 10 UNIT/ML
5 VIAL (ML) INTRAVENOUS
Status: CANCELLED | OUTPATIENT
Start: 2021-07-30

## 2021-06-04 RX ADMIN — Medication 250 ML: at 14:29

## 2021-06-04 NOTE — TELEPHONE ENCOUNTER
[3:48 PM] Jayden Bradford, I got a call from  the lab about pt [id number] who had a critical lab ---patient neutrophils--critical absolute neutrophil count of 0.4.  I called lizbeth and he did not answer....     RN notified of critical Absolute Neutrophil result for patient of 0.4.  Dr. Hancock unavailable, contacted Dr. Jenkins.  Patient likely already had infusion.  If patient develops fever or feeling ill, to present to ER.  Dr. Hancock to further advise when available.      Attempted to reach patient, message left requesting return call.      Melissa Brantley RN

## 2021-06-04 NOTE — PROGRESS NOTES
Infusion Nursing Note:  Onel White presents today for Entyvio.    Patient seen by provider today: No   present during visit today: Not Applicable.    Note: N/A.  Patient did meet criteria for an asymptomatic covid-19 PCR test in infusion today. Patient declined the covid-19 test.    Intravenous Access:  Peripheral IV placed.    Treatment Conditions:  Biological Infusion Checklist:  ~~~ NOTE: If the patient answers yes to any of the questions below, hold the infusion and contact ordering provider or on-call provider.    1. Have you recently had an elevated temperature, fever, chills, productive cough, coughing for 3 weeks or longer or hemoptysis, abnormal vital signs, night sweats,  chest pain or have you noticed a decrease in your appetite, unexplained weight loss or fatigue? No  2. Do you have any open wounds or new incisions? No  3. Do you have any recent or upcoming hospitalizations, surgeries or dental procedures? No  4. Do you currently have or recently have had any signs of illness or infection or are you on any antibiotics? -  5. Have you had any new, sudden or worsening abdominal pain? No  6. Have you or anyone in your household received a live vaccination in the past 4 weeks? Please note:  No live vaccines while on biologic/chemotherapy until 6 months after the last treatment.  Patient can receive the flu vaccine (shot only) and the pneumovax.  It is optimal for the patient to get these vaccines mid cycle, but they can be given at any time as long as it is not on the day of the infusion. No  7. Have you recently been diagnosed with any new nervous system diseases (ie. Multiple sclerosis, Guillain [place], seizures, neurological changes) or cancer diagnosis? No  8. Are you on any form of radiation or chemotherapy? No  9. Have you been having any signs of worsening depression or suicidal ideations?  (benlysta only) No  10. Have there been any other new onset medical symptoms? No        Post  Infusion Assessment:  Patient tolerated infusion without incident.  No evidence of extravasations.  Access discontinued per protocol.  Biologic Infusion Post Education: Call the triage nurse at your clinic or seek medical attention if you have chills and/or temperature greater than or equal to 100.5, uncontrolled nausea/vomiting, diarrhea, constipation, dizziness, shortness of breath, chest pain, heart palpitations, weakness or any other new or concerning symptoms, questions or concerns.  You cannot have any live virus vaccines prior to or during treatment or up to 6 months post infusion.  If you have an upcoming surgery, medical procedure or dental procedure during treatment, this should be discussed with your ordering physician and your surgeon/dentist.  If you are having any concerning symptom, if you are unsure if you should get your next infusion or wish to speak to a provider before your next infusion, please call your care coordinator or triage nurse at your clinic to notify them so we can adequately serve you.       Discharge Plan:   AVS to patient via [employer].  Patient will return 07/30/2021 for next appointment.   Patient discharged in stable condition accompanied by: self.  Departure Mode: Ambulatory.      Kimberley Vo RN

## 2021-06-05 DIAGNOSIS — D84.9 IMMUNOSUPPRESSION (H): Primary | ICD-10-CM

## 2021-06-05 DIAGNOSIS — D70.9 NEUTROPENIA, UNSPECIFIED TYPE (H): ICD-10-CM

## 2021-06-05 NOTE — PROGRESS NOTES
Telephone Note    Discussed with patient over the phone.  Leukopenia with  in pt on Vedo.  He notes that he had significant tooth infection with recent extraction and is on Amox TID and believes it is 10 day course.  He has completed half of ABX Rx.  We discussed that he is at high risk for sepsis and life threatening infection.  He needs to go to the ED if fever, abdominal pain, SOB or other concerns.  Cause of neutropenia could be due to tooth infection but also consider possibility of idiosyncratic drug induced neutropenia.  Suggested he contact dentist to see if there are alternatives to amox to complete ABX course but he should remain on antibiotic therapy.  Repeat labs this coming week.  If not improved, then needs to see heme or ID (or both) for urgent consult.  Liang Hancock MD

## 2021-06-05 NOTE — TELEPHONE ENCOUNTER
Contacted patient, has read My Chart message.  Patient is feeling well, has a tooth infection and has been on antibiotics, Amoxicillin, since Wednesday.  Wonders if this could be affecting things?  Patient reports that he did mention the tooth to infusion staff, thinks he mentioned the Amoxicillin, but cannot recall.  Reiterated that if he does not feel well or develops fever to present to ER.  Patient verbalizes understanding, agrees to plan.      Informed patient will return call early next week with further recommendations per Dr. Hancock.    Melissa Brantley RN

## 2021-06-05 NOTE — TELEPHONE ENCOUNTER
No return call from patient, My Chart message to patient informing of results and advising ER if not feeling well.     Melissa Brantley RN

## 2021-06-07 NOTE — TELEPHONE ENCOUNTER
Liang Hancock MD  You 2 days ago     Agree with ED if issues.  I talked to him over the phone, message below.       Telephone Note       Discussed with patient over the phone.  Leukopenia with  in pt on Vedo.  He notes that he had significant tooth infection with recent extraction and is on Amox TID and believes it is 10 day course.  He has completed half of ABX Rx.  We discussed that he is at high risk for sepsis and life threatening infection.  He needs to go to the ED if fever, abdominal pain, SOB or other concerns.  Cause of neutropenia could be due to tooth infection but also consider possibility of idiosyncratic drug induced neutropenia.  Suggested he contact dentist to see if there are alternatives to amox to complete ABX course but he should remain on antibiotic therapy.  Repeat labs this coming week.  If not improved, then needs to see heme or ID (or both) for urgent consult.  MD Melissa Bryan, RN

## 2021-06-09 NOTE — TELEPHONE ENCOUNTER
Attempted to reach patient, message left requesting return call.  Per Dr. Hancock, patient would be out of town for work and would recheck on Thursday or Friday.  Would like to schedule repeat lab appointment and check if dentist changed antibiotic.      Melissa Brantley RN

## 2021-06-10 NOTE — TELEPHONE ENCOUNTER
Attempted to reach patient, message left requesting return call.   My Chart message also sent to patient.      Melissa Brantley RN

## 2021-06-12 DIAGNOSIS — D84.9 IMMUNOSUPPRESSION (H): ICD-10-CM

## 2021-06-12 DIAGNOSIS — D70.9 NEUTROPENIA, UNSPECIFIED TYPE (H): ICD-10-CM

## 2021-06-12 LAB
ALBUMIN SERPL-MCNC: 3.9 G/DL (ref 3.4–5)
ALP SERPL-CCNC: 152 U/L (ref 40–150)
ALT SERPL W P-5'-P-CCNC: 73 U/L (ref 0–70)
ANION GAP SERPL CALCULATED.3IONS-SCNC: 4 MMOL/L (ref 3–14)
AST SERPL W P-5'-P-CCNC: 44 U/L (ref 0–45)
BASOPHILS # BLD AUTO: 0.1 10E9/L (ref 0–0.2)
BASOPHILS NFR BLD AUTO: 0.7 %
BILIRUB SERPL-MCNC: 0.4 MG/DL (ref 0.2–1.3)
BUN SERPL-MCNC: 17 MG/DL (ref 7–30)
CALCIUM SERPL-MCNC: 8.8 MG/DL (ref 8.5–10.1)
CHLORIDE SERPL-SCNC: 105 MMOL/L (ref 94–109)
CO2 SERPL-SCNC: 29 MMOL/L (ref 20–32)
CREAT SERPL-MCNC: 0.89 MG/DL (ref 0.66–1.25)
CRP SERPL-MCNC: <2.9 MG/L (ref 0–8)
DIFFERENTIAL METHOD BLD: NORMAL
EOSINOPHIL # BLD AUTO: 0.4 10E9/L (ref 0–0.7)
EOSINOPHIL NFR BLD AUTO: 4.7 %
ERYTHROCYTE [DISTWIDTH] IN BLOOD BY AUTOMATED COUNT: 12.3 % (ref 10–15)
GFR SERPL CREATININE-BSD FRML MDRD: >90 ML/MIN/{1.73_M2}
GLUCOSE SERPL-MCNC: 90 MG/DL (ref 70–99)
HCT VFR BLD AUTO: 50.1 % (ref 40–53)
HGB BLD-MCNC: 16.8 G/DL (ref 13.3–17.7)
IMM GRANULOCYTES # BLD: 0 10E9/L (ref 0–0.4)
IMM GRANULOCYTES NFR BLD: 0.2 %
LYMPHOCYTES # BLD AUTO: 2.7 10E9/L (ref 0.8–5.3)
LYMPHOCYTES NFR BLD AUTO: 33 %
MCH RBC QN AUTO: 30.2 PG (ref 26.5–33)
MCHC RBC AUTO-ENTMCNC: 33.5 G/DL (ref 31.5–36.5)
MCV RBC AUTO: 90 FL (ref 78–100)
MONOCYTES # BLD AUTO: 0.6 10E9/L (ref 0–1.3)
MONOCYTES NFR BLD AUTO: 7.5 %
NEUTROPHILS # BLD AUTO: 4.3 10E9/L (ref 1.6–8.3)
NEUTROPHILS NFR BLD AUTO: 53.9 %
NRBC # BLD AUTO: 0 10*3/UL
NRBC BLD AUTO-RTO: 0 /100
PLATELET # BLD AUTO: 272 10E9/L (ref 150–450)
POTASSIUM SERPL-SCNC: 4.1 MMOL/L (ref 3.4–5.3)
PROT SERPL-MCNC: 7.7 G/DL (ref 6.8–8.8)
RBC # BLD AUTO: 5.56 10E12/L (ref 4.4–5.9)
SODIUM SERPL-SCNC: 138 MMOL/L (ref 133–144)
WBC # BLD AUTO: 8 10E9/L (ref 4–11)

## 2021-06-12 PROCEDURE — 80053 COMPREHEN METABOLIC PANEL: CPT | Performed by: INTERNAL MEDICINE

## 2021-06-12 PROCEDURE — 86140 C-REACTIVE PROTEIN: CPT | Performed by: INTERNAL MEDICINE

## 2021-06-12 PROCEDURE — 36415 COLL VENOUS BLD VENIPUNCTURE: CPT | Performed by: INTERNAL MEDICINE

## 2021-06-12 PROCEDURE — 85025 COMPLETE CBC W/AUTO DIFF WBC: CPT | Performed by: INTERNAL MEDICINE

## 2021-06-25 ENCOUNTER — VIRTUAL VISIT (OUTPATIENT)
Dept: FAMILY MEDICINE | Facility: CLINIC | Age: 31
End: 2021-06-25
Payer: COMMERCIAL

## 2021-06-25 DIAGNOSIS — F41.1 GENERALIZED ANXIETY DISORDER: Primary | ICD-10-CM

## 2021-06-25 DIAGNOSIS — F33.41 RECURRENT MAJOR DEPRESSIVE DISORDER, IN PARTIAL REMISSION (H): ICD-10-CM

## 2021-06-25 PROCEDURE — 99214 OFFICE O/P EST MOD 30 MIN: CPT | Mod: 95 | Performed by: FAMILY MEDICINE

## 2021-06-25 PROCEDURE — 96127 BRIEF EMOTIONAL/BEHAV ASSMT: CPT | Performed by: FAMILY MEDICINE

## 2021-06-25 RX ORDER — SERTRALINE HYDROCHLORIDE 100 MG/1
100 TABLET, FILM COATED ORAL DAILY
Qty: 31 TABLET | Refills: 0 | Status: SHIPPED | OUTPATIENT
Start: 2021-06-25 | End: 2021-09-14

## 2021-06-25 ASSESSMENT — ANXIETY QUESTIONNAIRES
2. NOT BEING ABLE TO STOP OR CONTROL WORRYING: SEVERAL DAYS
GAD7 TOTAL SCORE: 8
1. FEELING NERVOUS, ANXIOUS, OR ON EDGE: SEVERAL DAYS
7. FEELING AFRAID AS IF SOMETHING AWFUL MIGHT HAPPEN: NOT AT ALL
6. BECOMING EASILY ANNOYED OR IRRITABLE: SEVERAL DAYS
5. BEING SO RESTLESS THAT IT IS HARD TO SIT STILL: MORE THAN HALF THE DAYS
3. WORRYING TOO MUCH ABOUT DIFFERENT THINGS: SEVERAL DAYS
IF YOU CHECKED OFF ANY PROBLEMS ON THIS QUESTIONNAIRE, HOW DIFFICULT HAVE THESE PROBLEMS MADE IT FOR YOU TO DO YOUR WORK, TAKE CARE OF THINGS AT HOME, OR GET ALONG WITH OTHER PEOPLE: NOT DIFFICULT AT ALL

## 2021-06-25 ASSESSMENT — PATIENT HEALTH QUESTIONNAIRE - PHQ9
SUM OF ALL RESPONSES TO PHQ QUESTIONS 1-9: 3
5. POOR APPETITE OR OVEREATING: MORE THAN HALF THE DAYS

## 2021-06-25 NOTE — PROGRESS NOTES
Onel is a 30 year old who is being evaluated via a billable telephone visit.      What phone number would you like to be contacted at? [phone]  How would you like to obtain your AVS? [facility]    Assessment & Plan       ICD-10-CM    1. Generalized anxiety disorder  F41.1 sertraline (ZOLOFT) 100 MG tablet   2. Recurrent major depressive disorder, in partial remission (H)  F33.41 sertraline (ZOLOFT) 100 MG tablet     Both of his depression and anxiety are getting better with the Zoloft 50 mg with no side effect.  Did not tolerate the Lexapro in the past.  Been on the Zoloft for a month.  His PHQ-9 and JASON-7 score today were 3 and 7 respectively.  No suicidal/homicidal ideation.  No hallucination.  No drugs or alcohol.    Will increase the Zoloft to 100 mg daily.  Potential side effects discussed.  Recommend to let me know via [employer] of how he feels with the medication in a month.  Consider to increase its dose to 150 if still not controlled at that time.    In the meantime, emphasized on exercising, healthy diet and adequate resting/water intake.  Encouraged to avoid high caffeine/sugar intake as well.    He felt comfortable with the plan and all of his questions were answered.        18 minutes spent on the date of the encounter doing chart review, patient visit and documentation       Return in about 6 months (around 12/25/2021) for Physical Exam, folow up anxiety/depression.    Nehemiah Hernandez Mai, MD  [facility]   Onel is a 30 year old who presents for the following health issues     HPI     Depression and Anxiety Follow-Up    How are you doing with your depression since your last visit? No change    How are you doing with your anxiety since your last visit?  Improved     Are you having other symptoms that might be associated with depression or anxiety? No    Have you had a significant life event? No     Do you have any concerns with your use of alcohol or other drugs?  No    Social History     Tobacco Use     Smoking status: Former Smoker     Types: Cigarettes     Quit date: 2012     Years since quittin.0     Smokeless tobacco: Never Used   Substance Use Topics     Alcohol use: Yes     Alcohol/week: 20.0 standard drinks     Types: 20 Standard drinks or equivalent per week     Comment: one after work every day and weekends 5-6 each weekend day     Drug use: Yes     Types: Marijuana     Comment: daily 2021     PHQ 2020   PHQ-9 Total Score 16 14 3   Q9: Thoughts of better off dead/self-harm past 2 weeks Not at all Not at all Not at all     JASON-7 SCORE 2020   Total Score 17 8         Suicide Assessment Five-step Evaluation and Treatment (SAFE-T)      How many servings of fruits and vegetables do you eat daily?  0-1    On average, how many sweetened beverages do you drink each day (Examples: soda, juice, sweet tea, etc.  Do NOT count diet or artificially sweetened beverages)?   1    How many days per week do you exercise enough to make your heart beat faster? 4    How many minutes a day do you exercise enough to make your heart beat faster? 30 - 60    How many days per week do you miss taking your medication? 0    Onel was seen today for follow-up on his depression and anxiety.  He was seen for them a month ago and please see my last dictation for further details.  Been taking the Zoloft at 50 mg daily as prescribed with no side effect.  He feels the Zoloft has been effective for both of his depression and anxiety.  Feel more energy and motivation - not as depressed.  Not as irritated easily and feeling calmer.  Been having more patience for his loved ones.  No suicidal or homicidal ideation.  No hallucination.  Not feeling as anxious or excessive worrying.  No drugs or alcohol.  He feels the Zoloft dose could be increased.  No other concern.    Review of Systems   Constitutional, HEENT, cardiovascular, pulmonary, gi and gu systems  are negative, except as otherwise noted.      Objective           Vitals:  No vitals were obtained today due to virtual visit.    Physical Exam   healthy, alert and no distress  PSYCH: Alert and oriented times 3; coherent speech, normal   rate and volume, able to articulate logical thoughts, able   to abstract reason, no tangential thoughts, no hallucinations   or delusions  His affect is normal  RESP: No cough, no audible wheezing, able to talk in full sentences  Remainder of exam unable to be completed due to telephone visits            Phone call duration: 6 minutes  Started time 2:55 PM  Ended time 3:01 PM

## 2021-06-26 ASSESSMENT — ANXIETY QUESTIONNAIRES: GAD7 TOTAL SCORE: 8

## 2021-06-28 RX ORDER — HEPARIN SODIUM (PORCINE) LOCK FLUSH IV SOLN 100 UNIT/ML 100 UNIT/ML
5 SOLUTION INTRAVENOUS
Status: CANCELLED | OUTPATIENT
Start: 2021-07-30

## 2021-06-28 RX ORDER — HEPARIN SODIUM,PORCINE 10 UNIT/ML
5 VIAL (ML) INTRAVENOUS
Status: CANCELLED | OUTPATIENT
Start: 2021-07-30

## 2021-07-30 ENCOUNTER — INFUSION THERAPY VISIT (OUTPATIENT)
Dept: INFUSION THERAPY | Facility: CLINIC | Age: 31
End: 2021-07-30
Payer: COMMERCIAL

## 2021-07-30 VITALS
OXYGEN SATURATION: 99 % | RESPIRATION RATE: 16 BRPM | BODY MASS INDEX: 28.15 KG/M2 | WEIGHT: 211 LBS | SYSTOLIC BLOOD PRESSURE: 128 MMHG | DIASTOLIC BLOOD PRESSURE: 86 MMHG | TEMPERATURE: 99.1 F | HEART RATE: 74 BPM

## 2021-07-30 DIAGNOSIS — K51.011 ULCERATIVE PANCOLITIS WITH RECTAL BLEEDING (H): Primary | ICD-10-CM

## 2021-07-30 PROCEDURE — 96365 THER/PROPH/DIAG IV INF INIT: CPT | Performed by: INTERNAL MEDICINE

## 2021-07-30 PROCEDURE — 99207 PR NO CHARGE LOS: CPT

## 2021-07-30 RX ORDER — HEPARIN SODIUM (PORCINE) LOCK FLUSH IV SOLN 100 UNIT/ML 100 UNIT/ML
5 SOLUTION INTRAVENOUS
Status: CANCELLED | OUTPATIENT
Start: 2021-09-24

## 2021-07-30 RX ORDER — HEPARIN SODIUM,PORCINE 10 UNIT/ML
5 VIAL (ML) INTRAVENOUS
Status: CANCELLED | OUTPATIENT
Start: 2021-09-24

## 2021-07-30 RX ADMIN — Medication 250 ML: at 12:25

## 2021-07-30 NOTE — PROGRESS NOTES
Infusion Nursing Note:  Onel A Cindy presents today for Entivio.    Patient seen by provider today: No   present during visit today: Not Applicable.    Note: N/A.    Intravenous Access:  Peripheral IV placed.    Treatment Conditions:  Biological Infusion Checklist:  ~~~ NOTE: If the patient answers yes to any of the questions below, hold the infusion and contact ordering provider or on-call provider.    1. Have you recently had an elevated temperature, fever, chills, productive cough, coughing for 3 weeks or longer or hemoptysis, abnormal vital signs, night sweats,  chest pain or have you noticed a decrease in your appetite, unexplained weight loss or fatigue? No  2. Do you have any open wounds or new incisions? No  3. Do you have any recent or upcoming hospitalizations, surgeries or dental procedures? No  4. Do you currently have or recently have had any signs of illness or infection or are you on any antibiotics? No  5. Have you had any new, sudden or worsening abdominal pain? No  6. Have you or anyone in your household received a live vaccination in the past 4 weeks? Please note:  No live vaccines while on biologic/chemotherapy until 6 months after the last treatment.  Patient can receive the flu vaccine (shot only) and the pneumovax.  It is optimal for the patient to get these vaccines mid cycle, but they can be given at any time as long as it is not on the day of the infusion. No  7. Have you recently been diagnosed with any new nervous system diseases (ie. Multiple sclerosis, Guillain [place], seizures, neurological changes) or cancer diagnosis? No  8. Are you on any form of radiation or chemotherapy? No  9. Have you been having any signs of worsening depression or suicidal ideations?  (benlysta only) No  10. Have there been any other new onset medical symptoms? No        Post Infusion Assessment:  Patient tolerated infusion without incident.  Site patent and intact, free from redness, edema or  discomfort.  No evidence of extravasations.  Access discontinued per protocol.       Discharge Plan:   AVS to patient via MYCHART.  Patient will return 09/24/2021 for next appointment.   Patient discharged in stable condition accompanied by: self.  Departure Mode: Ambulatory.      Kimberley Vo RN

## 2021-09-10 DIAGNOSIS — F41.1 GENERALIZED ANXIETY DISORDER: ICD-10-CM

## 2021-09-10 DIAGNOSIS — F33.41 RECURRENT MAJOR DEPRESSIVE DISORDER, IN PARTIAL REMISSION (H): ICD-10-CM

## 2021-09-14 RX ORDER — SERTRALINE HYDROCHLORIDE 100 MG/1
TABLET, FILM COATED ORAL
Qty: 31 TABLET | Refills: 1 | Status: SHIPPED | OUTPATIENT
Start: 2021-09-14 | End: 2022-01-05

## 2021-09-14 NOTE — TELEPHONE ENCOUNTER
"  Requested Prescriptions   Pending Prescriptions Disp Refills     sertraline (ZOLOFT) 100 MG tablet [Pharmacy Med Name: SERTRALINE HCL 100MG TABS] 31 tablet 0     Sig: TAKE ONE TABLET BY MOUTH DAILY   6/25/2021    SSRIs Protocol Passed - 9/10/2021 12:33 PM        Passed - PHQ-9 score less than 5 in past 6 months     Please review last PHQ-9 score.           Passed - Medication is active on med list        Passed - Patient is age 18 or older        Passed - Recent (6 mo) or future (30 days) visit within the authorizing provider's specialty     Patient had office visit in the last 6 months or has a visit in the next 30 days with authorizing provider or within the authorizing provider's specialty.  See \"Patient Info\" tab in inbasket, or \"Choose Columns\" in Meds & Orders section of the refill encounter.               Prescription approved per [facility] Refill Protocol.  Yasmin Candelario RN    "

## 2021-09-24 ENCOUNTER — LAB (OUTPATIENT)
Dept: LAB | Facility: CLINIC | Age: 31
End: 2021-09-24
Payer: COMMERCIAL

## 2021-09-24 ENCOUNTER — INFUSION THERAPY VISIT (OUTPATIENT)
Dept: INFUSION THERAPY | Facility: CLINIC | Age: 31
End: 2021-09-24
Payer: COMMERCIAL

## 2021-09-24 VITALS
SYSTOLIC BLOOD PRESSURE: 142 MMHG | WEIGHT: 206 LBS | BODY MASS INDEX: 27.48 KG/M2 | OXYGEN SATURATION: 97 % | RESPIRATION RATE: 16 BRPM | DIASTOLIC BLOOD PRESSURE: 92 MMHG | TEMPERATURE: 98.3 F | HEART RATE: 69 BPM

## 2021-09-24 DIAGNOSIS — K51.011 ULCERATIVE PANCOLITIS WITH RECTAL BLEEDING (H): Primary | ICD-10-CM

## 2021-09-24 LAB
ALBUMIN SERPL-MCNC: 4.5 G/DL (ref 3.4–5)
ALP SERPL-CCNC: 158 U/L (ref 40–150)
ALT SERPL W P-5'-P-CCNC: 95 U/L (ref 0–70)
AST SERPL W P-5'-P-CCNC: 49 U/L (ref 0–45)
BASOPHILS # BLD AUTO: 0.1 10E3/UL (ref 0–0.2)
BASOPHILS NFR BLD AUTO: 1 %
BILIRUB DIRECT SERPL-MCNC: 0.3 MG/DL (ref 0–0.2)
BILIRUB SERPL-MCNC: 1.2 MG/DL (ref 0.2–1.3)
CRP SERPL-MCNC: <2.9 MG/L (ref 0–8)
EOSINOPHIL # BLD AUTO: 0.5 10E3/UL (ref 0–0.7)
EOSINOPHIL NFR BLD AUTO: 7 %
ERYTHROCYTE [DISTWIDTH] IN BLOOD BY AUTOMATED COUNT: 12.8 % (ref 10–15)
ERYTHROCYTE [SEDIMENTATION RATE] IN BLOOD BY WESTERGREN METHOD: 5 MM/HR (ref 0–15)
HCT VFR BLD AUTO: 44.4 % (ref 40–53)
HGB BLD-MCNC: 15.3 G/DL (ref 13.3–17.7)
HOLD SPECIMEN: NORMAL
IMM GRANULOCYTES # BLD: 0 10E3/UL
IMM GRANULOCYTES NFR BLD: 0 %
LYMPHOCYTES # BLD AUTO: 2 10E3/UL (ref 0.8–5.3)
LYMPHOCYTES NFR BLD AUTO: 27 %
MCH RBC QN AUTO: 30.6 PG (ref 26.5–33)
MCHC RBC AUTO-ENTMCNC: 34.5 G/DL (ref 31.5–36.5)
MCV RBC AUTO: 89 FL (ref 78–100)
MONOCYTES # BLD AUTO: 0.6 10E3/UL (ref 0–1.3)
MONOCYTES NFR BLD AUTO: 7 %
NEUTROPHILS # BLD AUTO: 4.3 10E3/UL (ref 1.6–8.3)
NEUTROPHILS NFR BLD AUTO: 58 %
NRBC # BLD AUTO: 0 10E3/UL
NRBC BLD AUTO-RTO: 0 /100
PLATELET # BLD AUTO: 229 10E3/UL (ref 150–450)
PROT SERPL-MCNC: 8.1 G/DL (ref 6.8–8.8)
RBC # BLD AUTO: 5 10E6/UL (ref 4.4–5.9)
WBC # BLD AUTO: 7.4 10E3/UL (ref 4–11)

## 2021-09-24 PROCEDURE — 96365 THER/PROPH/DIAG IV INF INIT: CPT | Performed by: INTERNAL MEDICINE

## 2021-09-24 PROCEDURE — 99207 PR NO CHARGE LOS: CPT

## 2021-09-24 PROCEDURE — 36415 COLL VENOUS BLD VENIPUNCTURE: CPT | Performed by: INTERNAL MEDICINE

## 2021-09-24 PROCEDURE — 85025 COMPLETE CBC W/AUTO DIFF WBC: CPT | Performed by: INTERNAL MEDICINE

## 2021-09-24 PROCEDURE — 86140 C-REACTIVE PROTEIN: CPT | Performed by: INTERNAL MEDICINE

## 2021-09-24 PROCEDURE — 80076 HEPATIC FUNCTION PANEL: CPT | Performed by: INTERNAL MEDICINE

## 2021-09-24 PROCEDURE — 85652 RBC SED RATE AUTOMATED: CPT | Performed by: INTERNAL MEDICINE

## 2021-09-24 RX ORDER — HEPARIN SODIUM (PORCINE) LOCK FLUSH IV SOLN 100 UNIT/ML 100 UNIT/ML
5 SOLUTION INTRAVENOUS
Status: CANCELLED | OUTPATIENT
Start: 2021-11-19

## 2021-09-24 RX ORDER — HEPARIN SODIUM,PORCINE 10 UNIT/ML
5 VIAL (ML) INTRAVENOUS
Status: CANCELLED | OUTPATIENT
Start: 2021-11-19

## 2021-09-24 RX ADMIN — Medication 250 ML: at 12:38

## 2021-09-24 ASSESSMENT — PAIN SCALES - GENERAL: PAINLEVEL: NO PAIN (0)

## 2021-09-24 NOTE — PROGRESS NOTES
Infusion Nursing Note:  Onel White presents today for Entyvio.    Patient seen by provider today: No   present during visit today: Not Applicable.    Note: Pt reports he has been tolerating his infusions well. He does reports some increased diarrhea and bloody stools - he was directed to send a message to his provider via [employer].      Intravenous Access:  Peripheral IV placed.    Treatment Conditions:  Biological Infusion Checklist:  ~~~ NOTE: If the patient answers yes to any of the questions below, hold the infusion and contact ordering provider or on-call provider.    1. Have you recently had an elevated temperature, fever, chills, productive cough, coughing for 3 weeks or longer or hemoptysis, abnormal vital signs, night sweats,  chest pain or have you noticed a decrease in your appetite, unexplained weight loss or fatigue? No  2. Do you have any open wounds or new incisions? No  3. Do you have any recent or upcoming hospitalizations, surgeries or dental procedures? No  4. Do you currently have or recently have had any signs of illness or infection or are you on any antibiotics? No  5. Have you had any new, sudden or worsening abdominal pain? No  6. Have you or anyone in your household received a live vaccination in the past 4 weeks? Please note:  No live vaccines while on biologic/chemotherapy until 6 months after the last treatment.  Patient can receive the flu vaccine (shot only) and the pneumovax.  It is optimal for the patient to get these vaccines mid cycle, but they can be given at any time as long as it is not on the day of the infusion. No  7. Have you recently been diagnosed with any new nervous system diseases (ie. Multiple sclerosis, Guillain [place], seizures, neurological changes) or cancer diagnosis? No  8. Are you on any form of radiation or chemotherapy? No  9. Are you pregnant or breast feeding or do you have plans of pregnancy in the future? No  10. Have you been having any signs  of worsening depression or suicidal ideations?  (benlysta only) No  11. Have there been any other new onset medical symptoms? No        Post Infusion Assessment:  Patient tolerated infusion without incident.  Site patent and intact, free from redness, edema or discomfort.  No evidence of extravasations.  Access discontinued per protocol.       Discharge Plan:   Patient directed to scheduling for next appointment.   Patient discharged in stable condition accompanied by: self.  Departure Mode: Ambulatory.      Ashtyn Sosa RN

## 2021-10-23 ENCOUNTER — HEALTH MAINTENANCE LETTER (OUTPATIENT)
Age: 31
End: 2021-10-23

## 2021-11-23 ENCOUNTER — LAB (OUTPATIENT)
Dept: LAB | Facility: CLINIC | Age: 31
End: 2021-11-23

## 2021-11-23 ENCOUNTER — INFUSION THERAPY VISIT (OUTPATIENT)
Dept: INFUSION THERAPY | Facility: CLINIC | Age: 31
End: 2021-11-23
Payer: COMMERCIAL

## 2021-11-23 VITALS
HEART RATE: 73 BPM | BODY MASS INDEX: 27.4 KG/M2 | OXYGEN SATURATION: 97 % | TEMPERATURE: 98.4 F | SYSTOLIC BLOOD PRESSURE: 128 MMHG | DIASTOLIC BLOOD PRESSURE: 75 MMHG | WEIGHT: 205.4 LBS | RESPIRATION RATE: 16 BRPM

## 2021-11-23 DIAGNOSIS — K51.011 ULCERATIVE PANCOLITIS WITH RECTAL BLEEDING (H): Primary | ICD-10-CM

## 2021-11-23 LAB
ALBUMIN SERPL-MCNC: 3.9 G/DL (ref 3.4–5)
ALP SERPL-CCNC: 198 U/L (ref 40–150)
ALT SERPL W P-5'-P-CCNC: 214 U/L (ref 0–70)
AST SERPL W P-5'-P-CCNC: 113 U/L (ref 0–45)
BASOPHILS # BLD AUTO: 0 10E3/UL (ref 0–0.2)
BASOPHILS NFR BLD AUTO: 0 %
BILIRUB DIRECT SERPL-MCNC: 0.2 MG/DL (ref 0–0.2)
BILIRUB SERPL-MCNC: 0.5 MG/DL (ref 0.2–1.3)
CRP SERPL-MCNC: <2.9 MG/L (ref 0–8)
EOSINOPHIL # BLD AUTO: 0.3 10E3/UL (ref 0–0.7)
EOSINOPHIL NFR BLD AUTO: 4 %
ERYTHROCYTE [DISTWIDTH] IN BLOOD BY AUTOMATED COUNT: 12.2 % (ref 10–15)
ERYTHROCYTE [SEDIMENTATION RATE] IN BLOOD BY WESTERGREN METHOD: 5 MM/HR (ref 0–15)
HCT VFR BLD AUTO: 43.9 % (ref 40–53)
HGB BLD-MCNC: 14.7 G/DL (ref 13.3–17.7)
IMM GRANULOCYTES # BLD: 0 10E3/UL
IMM GRANULOCYTES NFR BLD: 0 %
LYMPHOCYTES # BLD AUTO: 1.8 10E3/UL (ref 0.8–5.3)
LYMPHOCYTES NFR BLD AUTO: 24 %
MCH RBC QN AUTO: 31 PG (ref 26.5–33)
MCHC RBC AUTO-ENTMCNC: 33.5 G/DL (ref 31.5–36.5)
MCV RBC AUTO: 93 FL (ref 78–100)
MONOCYTES # BLD AUTO: 0.7 10E3/UL (ref 0–1.3)
MONOCYTES NFR BLD AUTO: 9 %
NEUTROPHILS # BLD AUTO: 4.6 10E3/UL (ref 1.6–8.3)
NEUTROPHILS NFR BLD AUTO: 63 %
NRBC # BLD AUTO: 0 10E3/UL
NRBC BLD AUTO-RTO: 0 /100
PLATELET # BLD AUTO: 215 10E3/UL (ref 150–450)
PROT SERPL-MCNC: 7.3 G/DL (ref 6.8–8.8)
RBC # BLD AUTO: 4.74 10E6/UL (ref 4.4–5.9)
WBC # BLD AUTO: 7.4 10E3/UL (ref 4–11)

## 2021-11-23 PROCEDURE — 99207 PR NO CHARGE LOS: CPT

## 2021-11-23 PROCEDURE — 85652 RBC SED RATE AUTOMATED: CPT | Performed by: INTERNAL MEDICINE

## 2021-11-23 PROCEDURE — 36415 COLL VENOUS BLD VENIPUNCTURE: CPT | Performed by: INTERNAL MEDICINE

## 2021-11-23 PROCEDURE — 86140 C-REACTIVE PROTEIN: CPT | Performed by: INTERNAL MEDICINE

## 2021-11-23 PROCEDURE — 96365 THER/PROPH/DIAG IV INF INIT: CPT | Performed by: NURSE PRACTITIONER

## 2021-11-23 PROCEDURE — 85025 COMPLETE CBC W/AUTO DIFF WBC: CPT | Performed by: INTERNAL MEDICINE

## 2021-11-23 PROCEDURE — 80076 HEPATIC FUNCTION PANEL: CPT | Performed by: INTERNAL MEDICINE

## 2021-11-23 RX ORDER — HEPARIN SODIUM (PORCINE) LOCK FLUSH IV SOLN 100 UNIT/ML 100 UNIT/ML
5 SOLUTION INTRAVENOUS
Status: CANCELLED | OUTPATIENT
Start: 2022-01-14

## 2021-11-23 RX ORDER — HEPARIN SODIUM,PORCINE 10 UNIT/ML
5 VIAL (ML) INTRAVENOUS
Status: CANCELLED | OUTPATIENT
Start: 2022-01-14

## 2021-11-23 RX ADMIN — Medication 250 ML: at 12:58

## 2021-11-23 ASSESSMENT — PAIN SCALES - GENERAL: PAINLEVEL: NO PAIN (0)

## 2021-11-23 NOTE — PROGRESS NOTES
Infusion Nursing Note:  Onel White presents today for Entyvio.    Patient seen by provider today: No   present during visit today: Not Applicable.    Note: Patient has had an increase in his bowel symptoms over the last couple of weeks, so requested his labs be drawn today.  This was completed prior to the infusion appointment.    Intravenous Access:  Peripheral IV placed.    Treatment Conditions:  Biological Infusion Checklist:  ~~~ NOTE: If the patient answers yes to any of the questions below, hold the infusion and contact ordering provider or on-call provider.    1. Have you recently had an elevated temperature, fever, chills, productive cough, coughing for 3 weeks or longer or hemoptysis, abnormal vital signs, night sweats,  chest pain or have you noticed a decrease in your appetite, unexplained weight loss or fatigue? No  2. Do you have any open wounds or new incisions? No  3. Do you have any recent or upcoming hospitalizations, surgeries or dental procedures? No  4. Do you currently have or recently have had any signs of illness or infection or are you on any antibiotics? No  5. Have you had any new, sudden or worsening abdominal pain? No  6. Have you or anyone in your household received a live vaccination in the past 4 weeks? Please note:  No live vaccines while on biologic/chemotherapy until 6 months after the last treatment.  Patient can receive the flu vaccine (shot only) and the pneumovax.  It is optimal for the patient to get these vaccines mid cycle, but they can be given at any time as long as it is not on the day of the infusion. No  7. Have you recently been diagnosed with any new nervous system diseases (ie. Multiple sclerosis, Guillain Houghton, seizures, neurological changes) or cancer diagnosis? No  8. Are you on any form of radiation or chemotherapy? No  9. Are you pregnant or breast feeding or do you have plans of pregnancy in the future? No  10. Have you been having any signs of  worsening depression or suicidal ideations?  (benlysta only) No  11. Have there been any other new onset medical symptoms? No      Post Infusion Assessment:  Patient tolerated infusion without incident.  Blood return noted pre and post infusion.  Site patent and intact, free from redness, edema or discomfort.  No evidence of extravasations.  Access discontinued per protocol.     Discharge Plan:   Patient will return 1/21/2022 for next appointment.   Patient discharged in stable condition accompanied by: self.  Departure Mode: Ambulatory.    Darlene Balderas RN-BSN, PHN, OCN  [facility]

## 2021-12-02 ENCOUNTER — OFFICE VISIT (OUTPATIENT)
Dept: FAMILY MEDICINE | Facility: CLINIC | Age: 31
End: 2021-12-02
Payer: COMMERCIAL

## 2021-12-02 VITALS
TEMPERATURE: 98.4 F | SYSTOLIC BLOOD PRESSURE: 137 MMHG | HEART RATE: 69 BPM | BODY MASS INDEX: 26.22 KG/M2 | WEIGHT: 193.6 LBS | HEIGHT: 72 IN | DIASTOLIC BLOOD PRESSURE: 77 MMHG | OXYGEN SATURATION: 98 %

## 2021-12-02 DIAGNOSIS — M25.561 RIGHT KNEE PAIN, UNSPECIFIED CHRONICITY: Primary | ICD-10-CM

## 2021-12-02 PROCEDURE — 99213 OFFICE O/P EST LOW 20 MIN: CPT | Performed by: FAMILY MEDICINE

## 2021-12-02 ASSESSMENT — ASTHMA QUESTIONNAIRES
QUESTION_3 LAST FOUR WEEKS HOW OFTEN DID YOUR ASTHMA SYMPTOMS (WHEEZING, COUGHING, SHORTNESS OF BREATH, CHEST TIGHTNESS OR PAIN) WAKE YOU UP AT NIGHT OR EARLIER THAN USUAL IN THE MORNING: NOT AT ALL
ACT_TOTALSCORE: 25
QUESTION_1 LAST FOUR WEEKS HOW MUCH OF THE TIME DID YOUR ASTHMA KEEP YOU FROM GETTING AS MUCH DONE AT WORK, SCHOOL OR AT HOME: NONE OF THE TIME
QUESTION_2 LAST FOUR WEEKS HOW OFTEN HAVE YOU HAD SHORTNESS OF BREATH: NOT AT ALL
QUESTION_4 LAST FOUR WEEKS HOW OFTEN HAVE YOU USED YOUR RESCUE INHALER OR NEBULIZER MEDICATION (SUCH AS ALBUTEROL): NOT AT ALL
QUESTION_5 LAST FOUR WEEKS HOW WOULD YOU RATE YOUR ASTHMA CONTROL: COMPLETELY CONTROLLED

## 2021-12-02 ASSESSMENT — PAIN SCALES - GENERAL: PAINLEVEL: MODERATE PAIN (4)

## 2021-12-02 ASSESSMENT — MIFFLIN-ST. JEOR: SCORE: 1873.78

## 2021-12-02 NOTE — PROGRESS NOTES
Assessment & Plan       ICD-10-CM    1. Right knee pain, unspecified chronicity  M25.561 [facility] Referral     He does not have obvious internal derangement of the knee  I did discuss a possible MRI scan, but I recommended a trial of conservative treatment first  I will refer him to physical therapy for some knee strengthening exercises  I suggested trying those for a couple of months or so and hopefully that will improve and/or resolve his symptoms  If not, then consider follow-up for possible MRI imaging    Return in about 2 months (around 2/2/2022) for a recheck if symptoms worsen or fail to improve.    Lazarus Royal MD  [facility] KRISTINE Sykes is a 31 year old who presents for the following health issues     HPI     Pain History:  When did you first notice your pain? - 1 to 6 weeks   Have you seen any provider previously for this issue? No  How has your pain affected your ability to work? 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  In the last few weeks he felt like his right knee gave out on him a few times.  There has not been catching or locking or clicking.  He recalls a hyperextension injury from 3 to 4 years ago.  He was seen by a sports medicine provider in  "early 2018 and had right knee x-rays which were unremarkable.  He did not do any specific treatment for his knee after that.  His knee seemed to get better after that.  He has had no new recent injury in the last few months.    Patient Active Problem List   Diagnosis     Other acne     UC (Ulcerative Colitis), involving the entire gabi     PPD screening test-07/28/2009 Negative     Mild persistent asthma without complication     Cannabis use disorder, severe, dependence (H)     Ulcerative pancolitis with rectal bleeding (H)     Alcohol dependence (H)     Generalized anxiety disorder     Current Outpatient Medications   Medication     albuterol (PROAIR HFA/PROVENTIL HFA/VENTOLIN HFA) 108 (90 Base) MCG/ACT inhaler     albuterol (PROVENTIL) (2.5 MG/3ML) 0.083% neb solution     fluticasone-vilanterol (BREO ELLIPTA) 200-25 MCG/INH inhaler     sertraline (ZOLOFT) 100 MG tablet     UNABLE TO FIND     predniSONE (DELTASONE) 20 MG tablet     No current facility-administered medications for this visit.         Review of Systems   Noncontributory except as above.      Objective    /77 (BP Location: Right arm, Patient Position: Sitting, Cuff Size: Adult Regular)   Pulse 69   Temp 98.4  F (36.9  C) (Oral)   Ht 1.825 m (5' 11.85\")   Wt 87.8 kg (193 lb 9.6 oz)   SpO2 98%   BMI 26.37 kg/m    Body mass index is 26.37 kg/m .  Physical Exam   GENERAL: healthy, alert and no distress  MS: No apparent effusion of the right knee.  No warmth or erythema of the knee.  Junior's testing is negative.  No apparent pain or laxity with varus or valgus stressing of the knee.  Negative Lachman's.    Right knee x-ray results from March 2018 are reviewed and are unremarkable            "

## 2021-12-03 ASSESSMENT — ASTHMA QUESTIONNAIRES: ACT_TOTALSCORE: 25

## 2021-12-07 ENCOUNTER — VIRTUAL VISIT (OUTPATIENT)
Dept: GASTROENTEROLOGY | Facility: CLINIC | Age: 31
End: 2021-12-07
Payer: COMMERCIAL

## 2021-12-07 ENCOUNTER — THERAPY VISIT (OUTPATIENT)
Dept: PHYSICAL THERAPY | Facility: CLINIC | Age: 31
End: 2021-12-07
Attending: FAMILY MEDICINE
Payer: COMMERCIAL

## 2021-12-07 DIAGNOSIS — G89.29 CHRONIC PAIN OF RIGHT KNEE: ICD-10-CM

## 2021-12-07 DIAGNOSIS — M25.561 RIGHT KNEE PAIN, UNSPECIFIED CHRONICITY: ICD-10-CM

## 2021-12-07 DIAGNOSIS — R79.89 ABNORMAL LIVER FUNCTION TEST: ICD-10-CM

## 2021-12-07 DIAGNOSIS — F10.288 ALCOHOL DEPENDENCE WITH OTHER ALCOHOL-INDUCED DISORDER (H): ICD-10-CM

## 2021-12-07 DIAGNOSIS — M25.561 CHRONIC PAIN OF RIGHT KNEE: ICD-10-CM

## 2021-12-07 DIAGNOSIS — K51.011 ULCERATIVE PANCOLITIS WITH RECTAL BLEEDING (H): Primary | ICD-10-CM

## 2021-12-07 PROCEDURE — 97161 PT EVAL LOW COMPLEX 20 MIN: CPT | Mod: GP | Performed by: PHYSICAL THERAPIST

## 2021-12-07 PROCEDURE — 97110 THERAPEUTIC EXERCISES: CPT | Mod: GP | Performed by: PHYSICAL THERAPIST

## 2021-12-07 PROCEDURE — 99214 OFFICE O/P EST MOD 30 MIN: CPT | Mod: 95 | Performed by: INTERNAL MEDICINE

## 2021-12-07 ASSESSMENT — ACTIVITIES OF DAILY LIVING (ADL)
STIFFNESS: I HAVE THE SYMPTOM BUT IT DOES NOT AFFECT MY ACTIVITY
AS_A_RESULT_OF_YOUR_KNEE_INJURY,_HOW_WOULD_YOU_RATE_YOUR_CURRENT_LEVEL_OF_DAILY_ACTIVITY?: NORMAL
KNEEL ON THE FRONT OF YOUR KNEE: ACTIVITY IS NOT DIFFICULT
HOW_WOULD_YOU_RATE_THE_CURRENT_FUNCTION_OF_YOUR_KNEE_DURING_YOUR_USUAL_DAILY_ACTIVITIES_ON_A_SCALE_FROM_0_TO_100_WITH_100_BEING_YOUR_LEVEL_OF_KNEE_FUNCTION_PRIOR_TO_YOUR_INJURY_AND_0_BEING_THE_INABILITY_TO_PERFORM_ANY_OF_YOUR_USUAL_DAILY_ACTIVITIES?: 85
WALK: ACTIVITY IS MINIMALLY DIFFICULT
WEAKNESS: THE SYMPTOM AFFECTS MY ACTIVITY SLIGHTLY
RAW_SCORE: 56
SWELLING: I DO NOT HAVE THE SYMPTOM
SQUAT: ACTIVITY IS NOT DIFFICULT
LIMPING: THE SYMPTOM AFFECTS MY ACTIVITY SLIGHTLY
GIVING WAY, BUCKLING OR SHIFTING OF KNEE: THE SYMPTOM AFFECTS MY ACTIVITY MODERATELY
STAND: ACTIVITY IS MINIMALLY DIFFICULT
GO DOWN STAIRS: ACTIVITY IS NOT DIFFICULT
RISE FROM A CHAIR: ACTIVITY IS NOT DIFFICULT
KNEE_ACTIVITY_OF_DAILY_LIVING_SCORE: 80
SIT WITH YOUR KNEE BENT: ACTIVITY IS MINIMALLY DIFFICULT
PAIN: THE SYMPTOM AFFECTS MY ACTIVITY SLIGHTLY
KNEE_ACTIVITY_OF_DAILY_LIVING_SUM: 56
GO UP STAIRS: ACTIVITY IS MINIMALLY DIFFICULT
HOW_WOULD_YOU_RATE_THE_OVERALL_FUNCTION_OF_YOUR_KNEE_DURING_YOUR_USUAL_DAILY_ACTIVITIES?: NEARLY NORMAL

## 2021-12-07 NOTE — PROGRESS NOTES
Physical Therapy Initial Evaluation  Subjective:  The history is provided by the patient.   Therapist Generated HPI Evaluation  Problem details: Hyperextended it a few years ago.  6-7 months ago pain would increase after squatting.  1 Month ago knee gave out.  About 2 weeks ago it was doing it daily.  Lately has not been doing it.  Feels tired..         Type of problem:  Right knee.    This is a chronic condition.  Condition occurred with:  Insidious onset.  Where condition occurred: for unknown reasons.  Patient reports pain:  Lateral, medial and posterior.  Pain is described as aching and is intermittent.  Pain is worse during the day.  Since onset symptoms are gradually improving.  Associated symptoms:  Buckling/giving out and loss of strength (giving out has improved). Symptoms are exacerbated by bending/squatting (return from squat)  and relieved by rest.      Restrictions due to condition include:  Working in normal job without restrictions.      Patient Health History  Onel KIM White being seen for R knee.       Problem occurred: possibly due to hyperextension?   Pain is reported as 3/10 on pain scale.  General health as reported by patient is good.  Pertinent medical history includes: anemia, asthma, depression, numbness/tingling and smoking.     Medical allergies: other. Other medical allergies details: appendix.       Current medications:  Anti-depressants.    Current occupation is abatement.   Primary job tasks include:  Lifting/carrying and repetitive tasks.   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                                  Objective:  Standing Alignment:              Knee:  Genu varus R and genu varus L      Gait:    Gait Type:  Normal                                                           Knee Evaluation:  ROM:  AROM: normal (tight HS bilat)  PROM: normal  Strength:  Normal (fatigues with testing)            Ligament Testing:  Normal                Special Tests:       Right knee negative for the  following special tests:  Meniscal and Patellar Compression  Palpation:      Right knee tenderness present at:  Medial Joint Line and Popliteal  Right knee tenderness not present at:  Lateral Joint Line and Patellar Tendon  Edema:  Normal      Functional Testing:          Quad:    Single Leg Squat:  Left:       Normal control  Right:       Mild loss of control and femoral IR  Bilateral Leg Squat:   Normal control              General     ROS    Assessment/Plan:    Patient is a 31 year old male with right side knee complaints.    Patient has the following significant findings with corresponding treatment plan.                Diagnosis 1:  R knee pain  Pain -  self management, education, directional preference exercise and home program  Decreased strength - therapeutic exercise and therapeutic activities  Impaired muscle performance - neuro re-education  Decreased function - therapeutic activities    Therapy Evaluation Codes:   1) History comprised of:   Personal factors that impact the plan of care:      None.    Comorbidity factors that impact the plan of care are:      Asthma, Depression, Numbness/tingling and Smoking.     Medications impacting care: Anti-depressant.  2) Examination of Body Systems comprised of:   Body structures and functions that impact the plan of care:      Knee.   Activity limitations that impact the plan of care are:      Squatting/kneeling.  3) Clinical presentation characteristics are:   Stable/Uncomplicated.  4) Decision-Making    Low complexity using standardized patient assessment instrument and/or measureable assessment of functional outcome.  Cumulative Therapy Evaluation is: Low complexity.    Previous and current functional limitations:  (See Goal Flow Sheet for this information)    Short term and Long term goals: (See Goal Flow Sheet for this information)     Communication ability:  Patient appears to be able to clearly communicate and understand verbal and written communication and  follow directions correctly.  Treatment Explanation - The following has been discussed with the patient:   RX ordered/plan of care  Anticipated outcomes  Possible risks and side effects  This patient would benefit from PT intervention to resume normal activities.   Rehab potential is good.    Frequency:  1 X week, once daily  Duration:  for 6 weeks  Discharge Plan:  Achieve all LTG.  Independent in home treatment program.  Reach maximal therapeutic benefit.    Please refer to the daily flowsheet for treatment today, total treatment time and time spent performing 1:1 timed codes.

## 2021-12-07 NOTE — PROGRESS NOTES
GASTROENTEROLOGY telephone note    CC/REFERRING MD:      REASON FOR CONSULTATION:   No ref. provider found for   Chief Complaint   Patient presents with     Follow Up       HISTORY OF PRESENT ILLNESS:    Onel White is a 31 year old male who is being evaluated via a billable telephone visit.      We did a follow-up today for ulcerative colitis.  He is on vedolizumab every 8 weeks.  He reports that he has been feeling fairly well.  He has had a slight increase in watery stools recently though typically he reports he has been feeling pretty well.  He also gets intermittent rectal discomfort and a small amount of blood in the stool though symptoms are fairly new in nature and are relatively mild.  He has otherwise felt well.  There is no abdominal pain.  Weight is stable.  He does not have any significant itching, no joint pains, no skin rashes.  He does still note that he is drinking approximately 20 drinks per week which is a decrease from previous.      I have reviewed and updated the patient's Past Medical History, Social History, Family History and Medication List.        PERTINENT STUDIES have been reviewed.    ASSESSMENT/PLAN:    Onel White is a 31 year old male who presents for follow up of ulcerative colitis and abnormal liver test.    In regard to his ulcerative colitis, he has largely been in clinical remission though he does have a slight increase in loose stools and some rectal discomfort recently.  We will continue with Entyvio every 8 weeks.  If he continues to have significant issues with loose stools or worsening symptoms then we can get updated stool studies and possibly some imaging.  We will need to do another colonoscopy around March 2022 given adenomatous change in the colon polyp we removed but can also consider sooner if symptoms worsen.    He continues to have significantly abnormal liver tests.  They are of a mixed cholestatic, hepatocellular pattern which has tended to fluctuate.   Complicating this is fairly heavy alcohol use, approximately 20 drinks per week which is actually decreased from previous.  I recommended that he stop all alcohol use.  We will get an updated MRI/MRCP.  The last one in July 2020 showed prominent intrahepatic biliary ducts which were unchanged but no definitive PSC.  I also want him to get a liver biopsy.  We previously requested this but he did not schedule it because he reports he needs anesthesia.  He is agreeable to do this as long as he is anesthesia.  Last liver biopsy in 2009 with nonspecific change.    Age at diagnosis:  Dx 2009  Extent of disease:  Pancolitis  Current UC medications:  Vedolizumab q8wk  Prior UC surgeries:  none  Prior IBD Medications:  Sulfasalazine, steroids, Lialda        DRUG MONITORING  TPMT enzyme activity:   Phenotype normal, level 33     6-TGN/6-MMPN levels:  none     Biologic concentration:  none     DISEASE ASSESSMENT  Labs:       Recent Labs   Lab Test 10/16/20  1344 09/15/20  1630   CRP <2.9 <2.9   SED 7 5      Endoscopic assessment:   Colonoscopy March 2020  With pseudopolyps in the transverse colon, descending colon and sigmoid colon, minimal granularity of the rectum and 14 mm polyp of the ascending colon removed.  Path of polyp was inflammatory with focal adenomatous change.  Remainder of biopsies with crypt architectural distortion but no active inflammation.    Moderate to severe activity on colonoscopy 4/2019  Ortega 1 in rectum and sigmoid, Ortega 2 throughout the rest to the cecum  Enterography: none  Fecal calprotectin: June 2020 was 1060  C diff: none     sIBDQ:  No flowsheet data found.      IBD Health Care Maintenance:     Vaccinations:  All patients on biologics should avoid live vaccines.    -- Influenza (every year)  -- TdaP (every 10 years)  -- Pneumococcal Pneumonia (once plus booster at 5 years)  -- Yearly assessment for latent Tb (verbal screening and exam, PPD or QuantiFERON-Tb testing)     One time confirmation  of immunity or serologies:  -- Hepatitis A (serologies or immunizations)  -- Hepatitis B (serologies or immunizations)  -- Varicella  -- MMR  -- HPV (all aged 18-26)  -- Meningococcal meningitis (all patients at risk for meningitis)-- Due to the immunosuppression in this patient, I would not advise administration of live vaccines such as varicella/VZV, intranasal influenza, MMR, or yellow fever vaccine (if travelling).       Bone mineral density screening   -- Recommend all patients supplement with calcium and vitamin D  -- Given prior steroid use recommend DEXA if not already done     Cancer Screening:  Colon cancer screening:  Given pancoltis, recommend patient undergo regular dysplasia surveillance   Next dysplasia screening is recommended March 2021.     Cervical cancer screening: N/A     Skin cancer screening: Annual visual exam of skin by dermatologist since patient is immunocompromised     Depression Screening:  -- Over the last month, have you felt down, depressed, or hopeless? no  -- Over the last month, have you felt little interest or pleasure doing things? no     Misc:  -- Avoid tobacco use  -- Avoid NSAIDs as there is potentially a 25% chance of causing an IBD flare         Video-Visit Details    Type of service:  Telephone    Total telephone time:25    Originating Location (pt. Location): Home    Distant Location (provider location):  [facility]     Mode of Communication:  Telephone via Stream5    Liang Hancock MD    RTC 3 months    Onel is a 31 year old who is being evaluated via a billable video visit.      How would you like to obtain your AVS? MyChart  If the video visit is dropped, the invitation should be resent by: Text to cell phone: [phone]  Will anyone else be joining your video visit? No

## 2021-12-07 NOTE — PATIENT INSTRUCTIONS
Continue vedolizumab.    Recommend avoiding all alcohol.    Obtain MRI of the abdomen for further evaluation of the liver.    Set up liver biopsy with anesthesia.

## 2021-12-08 ENCOUNTER — HOSPITAL ENCOUNTER (OUTPATIENT)
Facility: AMBULATORY SURGERY CENTER | Age: 31
End: 2021-12-08
Attending: RADIOLOGY
Payer: COMMERCIAL

## 2021-12-08 DIAGNOSIS — R79.89 ABNORMAL LIVER FUNCTION TEST: ICD-10-CM

## 2021-12-08 DIAGNOSIS — Z11.59 ENCOUNTER FOR SCREENING FOR OTHER VIRAL DISEASES: ICD-10-CM

## 2021-12-08 DIAGNOSIS — K51.011 ULCERATIVE PANCOLITIS WITH RECTAL BLEEDING (H): Primary | ICD-10-CM

## 2021-12-08 NOTE — PROGRESS NOTES
Outpatient IR Biopsy Referral    Patient is a 32 y/o male with a PMH of depression, mild persistent asthma, ulcerative pancolitis w/rectal bleeding, JASON, ETOH dependence. IR has been asked to biopsy the liver due to longstanding LFTs, ETOH dependence and to evaluate for small duct primary sclerosing cholangitis, autoimmune hepatitis.     US abdomen 10/26/2020    IMPRESSION:   Liver: 18.2 cm. No focal hepatic lesion  1. Common bile duct measures 7 mm in diameter. No obstructing stone.  2. No focal hepatic lesion.   3. Splenic, portal, and hepatic veins are all antegrade in flow.    MRCP 7/22/2020    IMPRESSION:  1. Prominent intrahepatic biliary ducts, unchanged since 2006. No  extrahepatic biliary ductal dilatation. No features of PSC.  2. No features of hepatic parenchymal disease.        Procedure order and surgical pathology order placed.    If referring team would like other samples sent please enter prior to scheduled procedure.     IR schedulers will coordinate with patient.     Primary team Dr. Hancock made aware of IR recommendations via epic messaging.       CECIL Acuna CNP  Interventional Radiology   IR on-call pager: [phone]

## 2021-12-09 ENCOUNTER — TELEPHONE (OUTPATIENT)
Dept: GASTROENTEROLOGY | Facility: CLINIC | Age: 31
End: 2021-12-09
Payer: COMMERCIAL

## 2021-12-09 NOTE — TELEPHONE ENCOUNTER
"RN called mother back about procedure questions.    2 types of liver biopsy. One is less invasive than the other. Patient is concerned that it may be more invasive. Patient's father had procedure at [place] years ago where they went through the rib cartilage, didn't sedate patient and needle was \"the size of a pencil\" and patient is under impression he will need the same.  She is asking if this is less invasive and a smaller needle. RN explained that it says needle biopsy    Does he need a  for MRCP? No does not need  per instructions in scheduling. Also asked how long biopsy should take. Per the instructions in scheduling, states to expect to be at facility for 4-6 hours.     RN explained that if she does have access to [facility], should be able to see the common questions and instructions for patients with each of these procedures.     Routing to Dr Hanccok to advise on type of liver biopsy.    Sonia Nicholas RN, BSN  Gastroenterology Nurse Care Coordinator  Phone: [phone]  Fax: [phone]    "

## 2021-12-09 NOTE — TELEPHONE ENCOUNTER
Patient's mom has consent to communicate.     RN called mom and get questions. First number, Leighann answered and hung up. Tried again and only rang once and no option to leave VM.     Called second number listed and left detailed message to callback with questions for Dr Hancock about Liver Biopsy.    Sonia Nicholas RN, BSN  Gastroenterology Nurse Care Coordinator  Phone: [phone]  Fax: [phone]

## 2021-12-09 NOTE — TELEPHONE ENCOUNTER
[facility] Call Center    Phone Message    May a detailed message be left on voicemail: yes     Reason for Call: Other:      Pt's mother, Leighann, was returning a call to Sonia Nicholas. She is requesting a call back.       Please first try to reach her at her office line: [phone]  If she does not answer, then try her cell:[phone]      Action Taken: Message routed to:  Adult Clinics: Gastroenterology (GI) p [id number]    Travel Screening: Not Applicable

## 2021-12-09 NOTE — TELEPHONE ENCOUNTER
[facility] Call Center    Phone Message    May a detailed message be left on voicemail: yes     Reason for Call: Other: Patient's mother, Leighann, called as she and patient have questions about the liver biopsy procedure being done on 12/16/21. Please have Dr. Hancock follow up with her at  [phone], before 12:30pm today, or at [phone].  Thank you.    Action Taken: Message routed to:  [facility]    Travel Screening: Not Applicable

## 2021-12-13 ENCOUNTER — NURSE TRIAGE (OUTPATIENT)
Dept: NURSING | Facility: CLINIC | Age: 31
End: 2021-12-13
Payer: COMMERCIAL

## 2021-12-13 ENCOUNTER — LAB (OUTPATIENT)
Dept: LAB | Facility: CLINIC | Age: 31
End: 2021-12-13
Attending: RADIOLOGY
Payer: COMMERCIAL

## 2021-12-13 DIAGNOSIS — F10.288 ALCOHOL DEPENDENCE WITH OTHER ALCOHOL-INDUCED DISORDER (H): ICD-10-CM

## 2021-12-13 DIAGNOSIS — F10.288 ALCOHOL DEPENDENCE WITH OTHER ALCOHOL-INDUCED DISORDER (H): Primary | ICD-10-CM

## 2021-12-13 NOTE — TELEPHONE ENCOUNTER
Onel is calling and states that he had a liver biopsy on Thursday and missed his covid test.  FNA advised patient to phone Yaakov Sneed for an order to be placed for covid testing. Patient agrees and FNA transferred caller through to clinic.      COVID 19 Nurse Triage Plan/Patient Instructions    Please be aware that novel coronavirus (COVID-19) may be circulating in the community. If you develop symptoms such as fever, cough, or SOB or if you have concerns about the presence of another infection including coronavirus (COVID-19), please contact your health care provider or visit [email or web address].     Disposition/Instructions    Home care recommended. Follow home care protocol based instructions.    Thank you for taking steps to prevent the spread of this virus.  o Limit your contact with others.  o Wear a simple mask to cover your cough.  o Wash your hands well and often.    Resources    [facility]: About COVID-19: [email or web address]    CDC: What to Do If You're Sick: www.cdc.gov/coronavirus/2019-ncov/about/steps-when-sick.html    CDC: Ending Home Isolation: www.cdc.gov/coronavirus/2019-ncov/hcp/disposition-in-home-patients.html     CDC: Caring for Someone: www.cdc.gov/coronavirus/2019-ncov/if-you-are-sick/care-for-someone.html     [facility]: Interim Guidance for Hospital Discharge to Home: www.health.[facility].[place]/diseases/coronavirus/hcp/hospdischarge.pdf    [facility] clinical trials (COVID-19 research studies): [email or web address].[email or web address]-clinical-trials     Below are the COVID-19 hotlines at the [place] Department of Health ([facility] Interpreters are available.   o For health questions: Call 874-979-5437 or 1-519.219.6539 (7 a.m. to 7 p.m.)  o For questions about schools and childcare: Call 836-475-5468 or 1-926.543.9043 (7 a.m. to 7 p.m.)

## 2021-12-13 NOTE — TELEPHONE ENCOUNTER
Liang Hancock MD  [facility] 6 hours ago (9:27 AM)       A percutaneous liver biopsy is ordered.       I would have to defer the final details of the procedure to the radiologist performing but in general my experience is that the needle is typically passed below the ribs unless the liver is in a position where going between the ribs is necessary.     Again, I would have to defer to the radiologist as to their decision of needle size but I most commonly see an 18-gauge needle used to perform this procedure.  I believe that is approximately 1.3 mm diameter.     If there are significant procedural related concerns, sometimes radiology can schedule consultation prior to the procedure but in general I think it is reasonable to proceed with a direct procedure given that it is clearly needed.  Please let me know if there are other questions.     Liang Hancock MD      Mom contacted, informed of provider advice as noted above.  Mom reassured that the needle would likely not be the width of a pencil and that the procedure is scheduled under MAC sedation and patient will be in a sleep state.  Mom states that patient reported that he had a liver biopsy when he first had a colonoscopy under Dr. Paez.  Per review, Mom informed that this is correct, there was a liver biopsy dated 6/5/2009.      Mom encouraged to return call with any further questions or concerns.      Melissa Brantley RN

## 2021-12-14 ENCOUNTER — TELEPHONE (OUTPATIENT)
Dept: SURGERY | Facility: CLINIC | Age: 31
End: 2021-12-14

## 2021-12-14 ENCOUNTER — THERAPY VISIT (OUTPATIENT)
Dept: PHYSICAL THERAPY | Facility: CLINIC | Age: 31
End: 2021-12-14
Payer: COMMERCIAL

## 2021-12-14 DIAGNOSIS — M25.561 CHRONIC PAIN OF RIGHT KNEE: ICD-10-CM

## 2021-12-14 DIAGNOSIS — G89.29 CHRONIC PAIN OF RIGHT KNEE: ICD-10-CM

## 2021-12-14 PROCEDURE — 97110 THERAPEUTIC EXERCISES: CPT | Mod: GP | Performed by: PHYSICAL THERAPIST

## 2021-12-15 DIAGNOSIS — Z11.59 ENCOUNTER FOR SCREENING FOR OTHER VIRAL DISEASES: ICD-10-CM

## 2021-12-15 NOTE — TELEPHONE ENCOUNTER
"-Coronavirus (COVID-19) Notification    Caller Name (Patient, parent, daughter/son, grandparent, etc)  Patient     Reason for call  Notify of Positive Coronavirus (COVID-19) lab results, assess symptoms,  review  [facility] recommendations    Lab Result    Lab test:  2019-nCoV rRt-PCR or SARS-CoV-2 PCR    Oropharyngeal AND/OR nasopharyngeal swabs is POSITIVE for 2019-nCoV RNA/SARS-COV-2 PCR (COVID-19 virus)    RN Recommendations/Instructions per [facility] Coronavirus COVID-19 recommendations    Brief introduction script  Introduce self then review script:  \"I am calling on behalf of [employer].  We were notified that your Coronavirus test (COVID-19) for was POSITIVE for the virus.  I have some information to relay to you but first I wanted to mention that the [place] Dept of Health will be contacting you shortly [it's possible MD already called Patient] to talk to you more about how you are feeling and other people you have had contact with who might now also have the virus.  Also,  [employer] is Partnering with the [facility] for Covid-19 research, you may be contacted directly by research staff.\"    Assessment (Inquire about Patient's current symptoms)   Assessment   Current Symptoms at time of phone call: (if no symptoms, document No symptoms] none   Symptoms onset (if applicable) na     If at time of call, Patients symptoms hare worsened, the Patient should contact 911 or have someone drive them to Emergency Dept promptly:      If Patient calling 911, inform 911 personal that you have tested positive for the Coronavirus (COVID-19).  Place mask on and await 911 to arrive.    If Emergency Dept, If possible, please have another adult drive you to the Emergency Dept but you need to wear mask when in contact with other people.      Monoclonal Antibody Administration    You may be eligible to receive a new treatment with a monoclonal antibody for preventing hospitalization in patients at " "high risk for complications from COVID-19.   This medication is still experimental and available on a limited basis; it is given through an IV and must be given at an infusion center. Please note that not all people who are eligible will receive the medication since it is in limited supply.     Are you interested in being considered for this medication?  No.   Does the patient fit the criteria: No    If patient qualifies based on above criteria:  \"You will be contacted if you are selected to receive this treatment in the next 1-2 business days.   This is time sensitive and if you are not selected in the next 1-2 business days, you will not receive the medication.  If you do not receive a call to schedule, you have not been selected.\"      Review information with Patient    Your result was positive. This means you have COVID-19 (coronavirus).  We have sent you a letter that reviews the information that I'll be reviewing with you now.    How can I protect others?    If you have symptoms: stay home and away from others (self-isolate) until:    You've had no fever--and no medicine that reduces fever--for 1 full day (24 hours). And       Your other symptoms have gotten better. For example, your cough or breathing has improved. And     At least 10 days have passed since your symptoms started. (If you've been told by a doctor that you have a weak immune system, wait 20 days.)     If you don't have symptoms: Stay home and away from others (self-isolate) until at least 10 days have passed since your first positive COVID-19 test. (Date test collected)    During this time:    Stay in your own room, including for meals. Use your own bathroom if you can.    Stay away from others in your home. No hugging, kissing or shaking hands. No visitors.     Don't go to work, school or anywhere else.     Clean  high touch  surfaces often (doorknobs, counters, handles, etc.). Use a household cleaning spray or wipes. You'll find a full list " on the EPA website at www.epa.gov/pesticide-registration/list-n-disinfectants-use-against-sars-cov-2.     Cover your mouth and nose with a mask, tissue or other face covering to avoid spreading germs.    Wash your hands and face often with soap and water.    Make a list of people you have been in close contact with recently, even if either of you wore a face covering.   ; Start your list from 2 days before you became ill or had a positive test.  ; Include anyone that was within 6 feet of you for a cumulative total of 15 minutes or more in 24 hours. (Example: if you sat next to Jed for 5 minutes in the morning and 10 minutes in the afternoon, then you were in close contact for 15 minutes total that day. Jed would be added to your list.)    A public health worker will call or text you. It is important that you answer. They will ask you questions about possible exposures to COVID-19, such as people you have been in direct contact with and places you have visited.    Tell the people on your list that you have COVID-19; they should stay away from others for 14 days starting from the last time they were in contact with you (unless you are told something different from a public health worker).     Caregivers in these groups are at risk for severe illness due to COVID-19:  o People 65 years and older  o People who live in a nursing home or long-term care facility  o People with chronic disease (lung, heart, cancer, diabetes, kidney, liver, immunologic)  o People who have a weakened immune system, including those who:  - Are in cancer treatment  - Take medicine that weakens the immune system, such as corticosteroids  - Had a bone marrow or organ transplant  - Have an immune deficiency  - Have poorly controlled HIV or AIDS  - Are obese (body mass index of 40 or higher)  - Smoke regularly    Caregivers should wear gloves while washing dishes, handling laundry and cleaning bedrooms and bathrooms.    Wash and dry laundry with  special caution. Don't shake dirty laundry, and use the warmest water setting you can.    If you have a weakened immune system, ask your doctor about other actions you should take.    For more tips, go to www.cdc.gov/coronavirus/2019-ncov/downloads/10Things.pdf.    You should not go back to work until you meet the guidelines above for ending your home isolation. You don't need to be retested for COVID-19 before going back to work--studies show that you won't spread the virus if it's been at least 10 days since your symptoms started (or 20 days, if you have a weak immune system).    Employers: This document serves as formal notice of your employee's medical guidelines for going back to work. They must meet the above guidelines before going back to work in person.    How can I take care of myself?    1. Get lots of rest. Drink extra fluids (unless a doctor has told you not to).    2. Take Tylenol (acetaminophen) for fever or pain. If you have liver or kidney problems, ask your family doctor if it's okay to take Tylenol.     Take either:     650 mg (two 325 mg pills) every 4 to 6 hours, or     1,000 mg (two 500 mg pills) every 8 hours as needed.     Note: Don't take more than 3,000 mg in one day. Acetaminophen is found in many medicines (both prescribed and over-the-counter medicines). Read all labels to be sure you don't take too much.    For children, check the Tylenol bottle for the right dose (based on their age or weight).    3. If you have other health problems (like cancer, heart failure, an organ transplant or severe kidney disease): Call your specialty clinic if you don't feel better in the next 2 days.    4. Know when to call 911: Emergency warning signs include:    Trouble breathing or shortness of breath    Pain or pressure in the chest that doesn't go away    Feeling confused like you haven't felt before, or not being able to wake up    Bluish-colored lips or face    5. Sign up for [employer]. We know  it's scary to hear that you have COVID-19. We want to track your symptoms to make sure you're okay over the next 2 weeks. Please look for an email from [employer]--this is a free, online program that we'll use to keep in touch. To sign up, follow the link in the email. Learn more at [email or web address].    Where can I get more information?    [facility]: [email or web address]    Coronavirus Basics: www.health.Pending [facility] to [facility].[place]./diseases/coronavirus/basics.html    What to Do If You're Sick: www.cdc.gov/coronavirus/2019-ncov/about/steps-when-sick.html    Ending Home Isolation: www.cdc.gov/coronavirus/2019-ncov/hcp/disposition-in-home-patients.html     Caring for Someone with COVID-19: www.cdc.gov/coronavirus/2019-ncov/if-you-are-sick/care-for-someone.html     [facility] clinical trials (COVID-19 research studies): clinicalaffairs.[employer].[facility]/[employer]-clinical-trials     A Positive COVID-19 letter will be sent via [employer] or the mail. (Exception, no letters sent to Presurgerical/Preprocedure Patients)    Yolanda Childress LPN

## 2021-12-28 ENCOUNTER — ANCILLARY PROCEDURE (OUTPATIENT)
Dept: MRI IMAGING | Facility: CLINIC | Age: 31
End: 2021-12-28
Attending: INTERNAL MEDICINE
Payer: COMMERCIAL

## 2021-12-28 DIAGNOSIS — R79.89 ABNORMAL LIVER FUNCTION TEST: ICD-10-CM

## 2021-12-28 DIAGNOSIS — K51.011 ULCERATIVE PANCOLITIS WITH RECTAL BLEEDING (H): ICD-10-CM

## 2021-12-28 PROCEDURE — A9585 GADOBUTROL INJECTION: HCPCS | Performed by: RADIOLOGY

## 2021-12-28 PROCEDURE — 74183 MRI ABD W/O CNTR FLWD CNTR: CPT | Performed by: RADIOLOGY

## 2021-12-28 RX ORDER — GADOBUTROL 604.72 MG/ML
10 INJECTION INTRAVENOUS ONCE
Status: COMPLETED | OUTPATIENT
Start: 2021-12-28 | End: 2021-12-28

## 2021-12-28 RX ADMIN — GADOBUTROL 9 ML: 604.72 INJECTION INTRAVENOUS at 08:57

## 2021-12-29 ENCOUNTER — ANESTHESIA EVENT (OUTPATIENT)
Dept: SURGERY | Facility: AMBULATORY SURGERY CENTER | Age: 31
End: 2021-12-29
Payer: COMMERCIAL

## 2021-12-30 ENCOUNTER — ANCILLARY PROCEDURE (OUTPATIENT)
Dept: RADIOLOGY | Facility: AMBULATORY SURGERY CENTER | Age: 31
End: 2021-12-30
Attending: INTERNAL MEDICINE
Payer: COMMERCIAL

## 2021-12-30 ENCOUNTER — ANESTHESIA (OUTPATIENT)
Dept: SURGERY | Facility: AMBULATORY SURGERY CENTER | Age: 31
End: 2021-12-30
Payer: COMMERCIAL

## 2021-12-30 ENCOUNTER — HOSPITAL ENCOUNTER (OUTPATIENT)
Facility: AMBULATORY SURGERY CENTER | Age: 31
End: 2021-12-30
Attending: RADIOLOGY
Payer: COMMERCIAL

## 2021-12-30 VITALS
OXYGEN SATURATION: 99 % | HEIGHT: 74 IN | TEMPERATURE: 98 F | BODY MASS INDEX: 25.03 KG/M2 | HEART RATE: 80 BPM | RESPIRATION RATE: 16 BRPM | SYSTOLIC BLOOD PRESSURE: 120 MMHG | WEIGHT: 195 LBS | DIASTOLIC BLOOD PRESSURE: 80 MMHG

## 2021-12-30 DIAGNOSIS — R79.89 ABNORMAL LIVER FUNCTION TEST: ICD-10-CM

## 2021-12-30 DIAGNOSIS — K51.011 ULCERATIVE PANCOLITIS WITH RECTAL BLEEDING (H): ICD-10-CM

## 2021-12-30 LAB
INR PPP: 0.97 (ref 0.85–1.15)
PLATELET # BLD AUTO: 179 10E3/UL (ref 150–450)

## 2021-12-30 PROCEDURE — 47000 NEEDLE BIOPSY OF LIVER PERQ: CPT

## 2021-12-30 PROCEDURE — 88313 SPECIAL STAINS GROUP 2: CPT | Mod: 26 | Performed by: PATHOLOGY

## 2021-12-30 PROCEDURE — 76942 ECHO GUIDE FOR BIOPSY: CPT | Mod: 26 | Performed by: RADIOLOGY

## 2021-12-30 PROCEDURE — 47000 NEEDLE BIOPSY OF LIVER PERQ: CPT | Performed by: RADIOLOGY

## 2021-12-30 PROCEDURE — 88313 SPECIAL STAINS GROUP 2: CPT | Mod: TC,59 | Performed by: RADIOLOGY

## 2021-12-30 PROCEDURE — 88342 IMHCHEM/IMCYTCHM 1ST ANTB: CPT | Mod: 26 | Performed by: PATHOLOGY

## 2021-12-30 PROCEDURE — 88307 TISSUE EXAM BY PATHOLOGIST: CPT | Mod: 26 | Performed by: PATHOLOGY

## 2021-12-30 RX ORDER — LIDOCAINE HYDROCHLORIDE 20 MG/ML
INJECTION, SOLUTION INFILTRATION; PERINEURAL PRN
Status: DISCONTINUED | OUTPATIENT
Start: 2021-12-30 | End: 2021-12-30

## 2021-12-30 RX ORDER — GABAPENTIN 300 MG/1
300 CAPSULE ORAL
Status: DISCONTINUED | OUTPATIENT
Start: 2021-12-30 | End: 2021-12-31 | Stop reason: HOSPADM

## 2021-12-30 RX ORDER — LIDOCAINE 40 MG/G
CREAM TOPICAL
Status: DISCONTINUED | OUTPATIENT
Start: 2021-12-30 | End: 2021-12-31 | Stop reason: HOSPADM

## 2021-12-30 RX ORDER — PROPOFOL 10 MG/ML
INJECTION, EMULSION INTRAVENOUS PRN
Status: DISCONTINUED | OUTPATIENT
Start: 2021-12-30 | End: 2021-12-30

## 2021-12-30 RX ORDER — OXYCODONE HYDROCHLORIDE 5 MG/1
5 TABLET ORAL EVERY 4 HOURS PRN
Status: DISCONTINUED | OUTPATIENT
Start: 2021-12-30 | End: 2021-12-31 | Stop reason: HOSPADM

## 2021-12-30 RX ORDER — PROPOFOL 10 MG/ML
INJECTION, EMULSION INTRAVENOUS CONTINUOUS PRN
Status: DISCONTINUED | OUTPATIENT
Start: 2021-12-30 | End: 2021-12-30

## 2021-12-30 RX ORDER — SODIUM CHLORIDE, SODIUM LACTATE, POTASSIUM CHLORIDE, CALCIUM CHLORIDE 600; 310; 30; 20 MG/100ML; MG/100ML; MG/100ML; MG/100ML
INJECTION, SOLUTION INTRAVENOUS CONTINUOUS PRN
Status: DISCONTINUED | OUTPATIENT
Start: 2021-12-30 | End: 2021-12-30

## 2021-12-30 RX ORDER — ONDANSETRON 4 MG/1
4 TABLET, ORALLY DISINTEGRATING ORAL EVERY 30 MIN PRN
Status: DISCONTINUED | OUTPATIENT
Start: 2021-12-30 | End: 2021-12-31 | Stop reason: HOSPADM

## 2021-12-30 RX ORDER — HALOPERIDOL 5 MG/ML
1 INJECTION INTRAMUSCULAR
Status: DISCONTINUED | OUTPATIENT
Start: 2021-12-30 | End: 2021-12-31 | Stop reason: HOSPADM

## 2021-12-30 RX ORDER — ACETAMINOPHEN 325 MG/1
975 TABLET ORAL ONCE
Status: DISCONTINUED | OUTPATIENT
Start: 2021-12-30 | End: 2021-12-31 | Stop reason: HOSPADM

## 2021-12-30 RX ORDER — HYDROMORPHONE HYDROCHLORIDE 1 MG/ML
0.4 INJECTION, SOLUTION INTRAMUSCULAR; INTRAVENOUS; SUBCUTANEOUS EVERY 10 MIN PRN
Status: DISCONTINUED | OUTPATIENT
Start: 2021-12-30 | End: 2021-12-31 | Stop reason: HOSPADM

## 2021-12-30 RX ORDER — ONDANSETRON 2 MG/ML
4 INJECTION INTRAMUSCULAR; INTRAVENOUS EVERY 30 MIN PRN
Status: DISCONTINUED | OUTPATIENT
Start: 2021-12-30 | End: 2021-12-31 | Stop reason: HOSPADM

## 2021-12-30 RX ORDER — ALBUTEROL SULFATE 0.83 MG/ML
2.5 SOLUTION RESPIRATORY (INHALATION) EVERY 4 HOURS PRN
Status: DISCONTINUED | OUTPATIENT
Start: 2021-12-30 | End: 2021-12-31 | Stop reason: HOSPADM

## 2021-12-30 RX ORDER — SODIUM CHLORIDE, SODIUM LACTATE, POTASSIUM CHLORIDE, CALCIUM CHLORIDE 600; 310; 30; 20 MG/100ML; MG/100ML; MG/100ML; MG/100ML
INJECTION, SOLUTION INTRAVENOUS CONTINUOUS
Status: DISCONTINUED | OUTPATIENT
Start: 2021-12-30 | End: 2021-12-31 | Stop reason: HOSPADM

## 2021-12-30 RX ORDER — FENTANYL CITRATE 50 UG/ML
50 INJECTION, SOLUTION INTRAMUSCULAR; INTRAVENOUS EVERY 5 MIN PRN
Status: DISCONTINUED | OUTPATIENT
Start: 2021-12-30 | End: 2021-12-31 | Stop reason: HOSPADM

## 2021-12-30 RX ADMIN — PROPOFOL 30 MG: 10 INJECTION, EMULSION INTRAVENOUS at 09:41

## 2021-12-30 RX ADMIN — SODIUM CHLORIDE, SODIUM LACTATE, POTASSIUM CHLORIDE, CALCIUM CHLORIDE: 600; 310; 30; 20 INJECTION, SOLUTION INTRAVENOUS at 09:05

## 2021-12-30 RX ADMIN — SODIUM CHLORIDE, SODIUM LACTATE, POTASSIUM CHLORIDE, CALCIUM CHLORIDE: 600; 310; 30; 20 INJECTION, SOLUTION INTRAVENOUS at 08:56

## 2021-12-30 RX ADMIN — PROPOFOL 150 MCG/KG/MIN: 10 INJECTION, EMULSION INTRAVENOUS at 09:36

## 2021-12-30 RX ADMIN — PROPOFOL 50 MG: 10 INJECTION, EMULSION INTRAVENOUS at 09:48

## 2021-12-30 RX ADMIN — PROPOFOL 50 MG: 10 INJECTION, EMULSION INTRAVENOUS at 09:36

## 2021-12-30 RX ADMIN — PROPOFOL 20 MG: 10 INJECTION, EMULSION INTRAVENOUS at 09:43

## 2021-12-30 RX ADMIN — LIDOCAINE HYDROCHLORIDE 40 MG: 20 INJECTION, SOLUTION INFILTRATION; PERINEURAL at 09:38

## 2021-12-30 ASSESSMENT — MIFFLIN-ST. JEOR: SCORE: 1914.26

## 2021-12-30 NOTE — DISCHARGE INSTRUCTIONS
Discharge Instructions for Paracentesis or Thoracentesis     Paracentesis is a procedure to remove extra fluid from your belly (abdomen). Thoracentesis is a procedure to remove extra fluid from your chest.  This fluid buildup is called ascites. The procedure may have been done to take a sample of the fluid. Or, it may have been done to drain the extra fluid from your abdomen or chest to help make you more comfortable.     Home care    If you have pain after the procedure, your healthcare provider can prescribe or recommend pain medicines. Take these exactly as directed. If you stopped taking other medicines before the procedure, ask your provider when you can start them again.    Avoid strenuous activity for 48 hours.    You will have a small bandage over the puncture site. Adhesive tapes, adhesive strips, or surgical glue may also be used to close the incision. They also help stop bleeding and promote healing. You may take the bandage off in 24-48 hours. Once there is a scab over the site, no bandages are required.    Check the puncture site for the signs of infection listed below.     Follow-up care  Make a follow-up appointment with your healthcare provider as directed. During your follow-up visit, your provider will check your healing. Let your provider know how you are feeling. You can also discuss the cause of your fluid, and if you need any further treatment.    When to seek medical advice:  Call your healthcare provider if you have any of the following after the procedure:    A fever of 100.4 F (38.0 C) or higher    Trouble breathing    Abdominal pain that is worse than expected    Belly abdominal pain not caused by having the skin punctured that is worse than expected    Persistent bleeding from the puncture site    More than a small amount of fluid leaking from the puncture site    Swollen belly    Signs of infection at the puncture site. These include increased pain, redness, or swelling, warmth, or  bad-smelling drainage.    Feeling dizzy or lightheaded, or fainting     Call our Interventional Radiology (IR) service if:  If you start bleeding from the procedure site.  If you do start to bleed from the site, lie down and hold some pressure on the site.  Our radiology provider can help you decide if you need to return to the hospital.  If you have new or worsening pain related to the procedure.  If you have pronounced swelling at the procedure site.  If you develop persistent nausea or vomiting.  If you develop hives or a rash or any unexplained itching.  If you have a fever of greater than 100.4  F and chills in the first 5 days after procedure.  Any other concerns related to your procedure.      [facility]  Interventional Radiology (IR)  [address] [place] [address]    Contact Number:  [phone]  (IR control desk)  Monday - Friday 8:00 am - 4:30 pm    After hours for urgent concerns:  [phone]  After 4:30 pm Monday - Friday, Weekends and Holidays.   Ask for Interventional Radiology on-call.  Someone is available 24 hours a day.  [facility] toll free number:  [phone]

## 2021-12-30 NOTE — BRIEF OP NOTE
[facility]    Brief Operative Note    Pre-operative diagnosis: Abnormal LFTs [R94.5]  Post-operative diagnosis Same as pre-operative diagnosis    Procedure: Procedure(s):  NEEDLE BIOPSY, LIVER, PERCUTANEOUS  Surgeon: Surgeon(s) and Role:     * Samson Srivastava MD - Primary  Anesthesia: Monitor Anesthesia Care   Estimated Blood Loss: Minimal    Drains: None  Specimens:   ID Type Source Tests Collected by Time Destination   1 : liver biopsy Biopsy Liver SURGICAL PATHOLOGY EXAM Samson Srivastava MD 12/30/2021  8:59 AM      Findings:   2 18g core biopsy of left lobe liver from right paramidline subcostal approach under direct U/S guidance.  3 gelfoam plugs across tract.  No bleeding. .  Complications: None.  Implants: * No implants in log *

## 2021-12-30 NOTE — OR NURSING
Liver biopsy cancelled on 12/30/21 by the provider due to patient testing positive for covid on 12/13 and being immunocompromised. MD advises to wait one month post covid.

## 2021-12-30 NOTE — ANESTHESIA POSTPROCEDURE EVALUATION
Patient: Onel White    Procedure: Procedure(s):  NEEDLE BIOPSY, LIVER, PERCUTANEOUS       Diagnosis:Abnormal LFTs [R94.5]  Diagnosis Additional Information: No value filed.    Anesthesia Type:  MAC    Note:  Disposition: Outpatient   Postop Pain Control: Uneventful            Sign Out: Well controlled pain   PONV: No   Neuro/Psych: Uneventful            Sign Out: Acceptable/Baseline neuro status   Airway/Respiratory: Uneventful            Sign Out: Acceptable/Baseline resp. status   CV/Hemodynamics: Uneventful            Sign Out: Acceptable CV status; No obvious hypovolemia; No obvious fluid overload   Other NRE: NONE   DID A NON-ROUTINE EVENT OCCUR? No           Last vitals:  Vitals Value Taken Time   /80 12/30/21 1112   Temp 36.7  C (98  F) 12/30/21 1112   Pulse 80 12/30/21 1112   Resp 16 12/30/21 1112   SpO2 99 % 12/30/21 1112       Electronically Signed By: Mohsen Renteria MD  December 30, 2021  12:48 PM

## 2022-01-20 ENCOUNTER — INFUSION THERAPY VISIT (OUTPATIENT)
Dept: INFUSION THERAPY | Facility: CLINIC | Age: 32
End: 2022-01-20
Payer: COMMERCIAL

## 2022-01-20 VITALS
RESPIRATION RATE: 16 BRPM | SYSTOLIC BLOOD PRESSURE: 134 MMHG | DIASTOLIC BLOOD PRESSURE: 84 MMHG | HEART RATE: 79 BPM | TEMPERATURE: 97.7 F | OXYGEN SATURATION: 98 %

## 2022-01-20 DIAGNOSIS — Z11.59 ENCOUNTER FOR SCREENING FOR OTHER VIRAL DISEASES: Primary | ICD-10-CM

## 2022-01-20 DIAGNOSIS — K51.011 ULCERATIVE PANCOLITIS WITH RECTAL BLEEDING (H): ICD-10-CM

## 2022-01-20 PROCEDURE — 96365 THER/PROPH/DIAG IV INF INIT: CPT | Performed by: NURSE PRACTITIONER

## 2022-01-20 PROCEDURE — 99207 PR NO CHARGE LOS: CPT

## 2022-01-20 RX ORDER — HEPARIN SODIUM (PORCINE) LOCK FLUSH IV SOLN 100 UNIT/ML 100 UNIT/ML
5 SOLUTION INTRAVENOUS
Status: CANCELLED | OUTPATIENT
Start: 2022-03-15

## 2022-01-20 RX ORDER — HEPARIN SODIUM,PORCINE 10 UNIT/ML
5 VIAL (ML) INTRAVENOUS
Status: CANCELLED | OUTPATIENT
Start: 2022-03-15

## 2022-01-20 RX ORDER — FEXOFENADINE HCL 180 MG/1
180 TABLET ORAL DAILY
COMMUNITY
End: 2022-11-14

## 2022-01-20 RX ADMIN — Medication 250 ML: at 16:21

## 2022-01-20 NOTE — PROGRESS NOTES
Infusion Nursing Note:  Onel White presents today for Entyvio.    Patient seen by provider today: No   present during visit today: Not Applicable.    Note: Patient reporting a new rash on his arms, abdomen and back.  Stated it started on 1/11 was hive like in nature with puritis that lasted for days.  Stated he has home steroids (20 mg tablets) that he took daily for 3 days along with allegra.  Finally stopped itching yesterday (1/19).  Today, it presents similar to a contact dermatitis rash ( in appearance).     Call placed to Dr. Hancock regarding proceeding with this rash in place.  Per Dr. Hancock, ok to proceed with Entyvio today.  Instructed patient to send pictures of his rash via my chart and he will place a dermatology consult for further guidance. Patient verbalized understanding and stated he will send pictures when he arrives home tonight.     Patient declined covid pcr testing today.       Intravenous Access:  Peripheral IV placed.    Treatment Conditions:  Biological Infusion Checklist:  ~~~ NOTE: If the patient answers yes to any of the questions below, hold the infusion and contact ordering provider or on-call provider.    1. Have you recently had an elevated temperature, fever, chills, productive cough, coughing for 3 weeks or longer or hemoptysis, abnormal vital signs, night sweats,  chest pain or have you noticed a decrease in your appetite, unexplained weight loss or fatigue? No  2. Do you have any open wounds or new incisions? No  3. Do you have any recent or upcoming hospitalizations, surgeries or dental procedures? No  4. Do you currently have or recently have had any signs of illness or infection or are you on any antibiotics? Yes, recent Covid +  5. Have you had any new, sudden or worsening abdominal pain? No  6. Have you or anyone in your household received a live vaccination in the past 4 weeks? Please note:  No live vaccines while on biologic/chemotherapy until 6 months  after the last treatment.  Patient can receive the flu vaccine (shot only) and the pneumovax.  It is optimal for the patient to get these vaccines mid cycle, but they can be given at any time as long as it is not on the day of the infusion. No  7. Have you recently been diagnosed with any new nervous system diseases (ie. Multiple sclerosis, Guillain [place], seizures, neurological changes) or cancer diagnosis? No  8. Are you on any form of radiation or chemotherapy? No  9. Are you pregnant or breast feeding or do you have plans of pregnancy in the future? No  10. Have you been having any signs of worsening depression or suicidal ideations?  (benlysta only) No  11. Have there been any other new onset medical symptoms? Yes, new body rash on abdomen and arms        Post Infusion Assessment:  Patient tolerated infusion without incident.  Blood return noted pre and post infusion.  Site patent and intact, free from redness, edema or discomfort.  No evidence of extravasations.  Access discontinued per protocol.       Discharge Plan:   Discharge instructions reviewed with: Patient.  Patient and/or family verbalized understanding of discharge instructions and all questions answered.  Patient discharged in stable condition accompanied by: self.  Departure Mode: Ambulatory.      Kaykay Christianson RN

## 2022-01-21 ENCOUNTER — TELEPHONE (OUTPATIENT)
Dept: INFUSION THERAPY | Facility: CLINIC | Age: 32
End: 2022-01-21

## 2022-01-21 NOTE — NURSING NOTE
Called patient on 1/20. Refused to wear a medical mask today during infusion appt. Wore a neck gaiter. Stated to me he has a medical exception. He does not believe the  paper  mask does anything to protect anyone. Tested positive on 12/14. Refuses to re-test w a COVID PCR. Stated he has taken three home tests and all were negative. I shared patient policy with him. Home tests were not an excepted form of testing for patients.  Patient was frequently using inappropriate language over the phone. I did share with patient we were both expected to be professional during the conversation. Patient is sick of having these discussions with us. He is going to do what he wants to do. He is not going to be tested. Patient had questions about various types of masks. Valved, face shield, cloth, neck gaiter and he also talked about some form of enclosed airway system (told the patient I was unfamiliar with this). Bottom line I told patient our policy required a medical mask to be compliant. Stated he would wear a mask next time but he would not agree that he would be wearing the medical mask that supports our organizations policy. Patient hang up on me before I could further explain that whether or not he chose to wear a medical mask or any other form of mask he would need to be roomed immediately and treated as a PUI full PPE and treat him as a PUI (gown, gloves, N95, eye protection) since he continues to refuse a COVID PCR. I will enter a Compass based on behavior over the phone and send an inbasket email to his ordering provider about medical need for treatment and to address behavior. I added the following to his next f2f appts scheduled for March 17th.  Based on policy as of 1/20 Patient needs to be roomed upon check-in. Isolation required unable to wear medical mask.    Ordering provider did respond.  Infusion is medically necessary.  Will continue to infuse Onel at [facility] using PUI precautions.   Deana Jordan RN BSN OCN  PHN  Patient Care Manager

## 2022-02-12 ENCOUNTER — HEALTH MAINTENANCE LETTER (OUTPATIENT)
Age: 32
End: 2022-02-12

## 2022-03-10 PROBLEM — G89.29 CHRONIC PAIN OF RIGHT KNEE: Status: RESOLVED | Noted: 2021-12-07 | Resolved: 2022-03-10

## 2022-03-10 PROBLEM — M25.561 CHRONIC PAIN OF RIGHT KNEE: Status: RESOLVED | Noted: 2021-12-07 | Resolved: 2022-03-10

## 2022-03-10 NOTE — PROGRESS NOTES
Subjective:  HPI  Physical Exam                    Objective:  System    Physical Exam    General     ROS    Assessment/Plan:    DISCHARGE REPORT    Progress reporting period is from 12/7/2021 to 12/14/2021.     SUBJECTIVE  Subjective: Slightly improved.  Have been doing a lot of deep squats at work which increase pain a little.   Current Pain level: 2/10   Initial Pain level: 3/10        ;   ,     Patient has failed to return to therapy so current objective findings are unknown.  The subjective and objective information are from the last SOAP note on this patient.    OBJECTIVE  Objective: Fatigues with new exercises.        ASSESSMENT/PLAN  Updated problem list and treatment plan: Diagnosis 1:  R knee pain  Pain -  self management, education, directional preference exercise and home program  Decreased strength - therapeutic exercise and therapeutic activities  Impaired muscle performance - neuro re-education  Decreased function - therapeutic activities     STG/LTGs have been met or progress has been made towards goals:  unknown  Assessment of Progress: The patient has not returned to therapy. Current status is unknown.  Self Management Plans:  Patient has been instructed in a home treatment program.    Onel continues to require the following intervention to meet STG and LTG's: PT intervention is no longer required to meet STG/LTG.  The patient failed to complete scheduled/ordered appointments so current information is unknown.  We will discharge this patient from PT.    Recommendations:  Canceled last visits and did not reschedule.  Discharge pt from PT.      Please refer to the daily flowsheet for treatment today, total treatment time and time spent performing 1:1 timed codes.

## 2022-03-17 ENCOUNTER — LAB (OUTPATIENT)
Dept: LAB | Facility: CLINIC | Age: 32
End: 2022-03-17
Payer: COMMERCIAL

## 2022-03-17 ENCOUNTER — INFUSION THERAPY VISIT (OUTPATIENT)
Dept: INFUSION THERAPY | Facility: CLINIC | Age: 32
End: 2022-03-17
Payer: COMMERCIAL

## 2022-03-17 VITALS
SYSTOLIC BLOOD PRESSURE: 146 MMHG | RESPIRATION RATE: 16 BRPM | DIASTOLIC BLOOD PRESSURE: 84 MMHG | TEMPERATURE: 97.9 F | HEART RATE: 60 BPM | OXYGEN SATURATION: 99 %

## 2022-03-17 DIAGNOSIS — K51.011 ULCERATIVE PANCOLITIS WITH RECTAL BLEEDING (H): Primary | ICD-10-CM

## 2022-03-17 LAB
ALBUMIN SERPL-MCNC: 4.1 G/DL (ref 3.4–5)
ALP SERPL-CCNC: 111 U/L (ref 40–150)
ALT SERPL W P-5'-P-CCNC: 52 U/L (ref 0–70)
AST SERPL W P-5'-P-CCNC: 38 U/L (ref 0–45)
BASOPHILS # BLD AUTO: 0 10E3/UL (ref 0–0.2)
BASOPHILS NFR BLD AUTO: 1 %
BILIRUB DIRECT SERPL-MCNC: 0.2 MG/DL (ref 0–0.2)
BILIRUB SERPL-MCNC: 0.7 MG/DL (ref 0.2–1.3)
CRP SERPL-MCNC: <2.9 MG/L (ref 0–8)
EOSINOPHIL # BLD AUTO: 0.5 10E3/UL (ref 0–0.7)
EOSINOPHIL NFR BLD AUTO: 8 %
ERYTHROCYTE [DISTWIDTH] IN BLOOD BY AUTOMATED COUNT: 12.2 % (ref 10–15)
ERYTHROCYTE [SEDIMENTATION RATE] IN BLOOD BY WESTERGREN METHOD: 4 MM/HR (ref 0–15)
HCT VFR BLD AUTO: 45.7 % (ref 40–53)
HGB BLD-MCNC: 15.5 G/DL (ref 13.3–17.7)
IMM GRANULOCYTES # BLD: 0 10E3/UL
IMM GRANULOCYTES NFR BLD: 0 %
LYMPHOCYTES # BLD AUTO: 2 10E3/UL (ref 0.8–5.3)
LYMPHOCYTES NFR BLD AUTO: 29 %
MCH RBC QN AUTO: 30.9 PG (ref 26.5–33)
MCHC RBC AUTO-ENTMCNC: 33.9 G/DL (ref 31.5–36.5)
MCV RBC AUTO: 91 FL (ref 78–100)
MONOCYTES # BLD AUTO: 0.6 10E3/UL (ref 0–1.3)
MONOCYTES NFR BLD AUTO: 8 %
NEUTROPHILS # BLD AUTO: 3.9 10E3/UL (ref 1.6–8.3)
NEUTROPHILS NFR BLD AUTO: 54 %
NRBC # BLD AUTO: 0 10E3/UL
NRBC BLD AUTO-RTO: 0 /100
PLATELET # BLD AUTO: 217 10E3/UL (ref 150–450)
PROT SERPL-MCNC: 7.5 G/DL (ref 6.8–8.8)
RBC # BLD AUTO: 5.01 10E6/UL (ref 4.4–5.9)
WBC # BLD AUTO: 7 10E3/UL (ref 4–11)

## 2022-03-17 PROCEDURE — 86140 C-REACTIVE PROTEIN: CPT | Performed by: INTERNAL MEDICINE

## 2022-03-17 PROCEDURE — 85025 COMPLETE CBC W/AUTO DIFF WBC: CPT | Performed by: INTERNAL MEDICINE

## 2022-03-17 PROCEDURE — 36415 COLL VENOUS BLD VENIPUNCTURE: CPT | Performed by: INTERNAL MEDICINE

## 2022-03-17 PROCEDURE — 85652 RBC SED RATE AUTOMATED: CPT | Performed by: INTERNAL MEDICINE

## 2022-03-17 PROCEDURE — 80076 HEPATIC FUNCTION PANEL: CPT | Performed by: INTERNAL MEDICINE

## 2022-03-17 PROCEDURE — 96365 THER/PROPH/DIAG IV INF INIT: CPT | Performed by: INTERNAL MEDICINE

## 2022-03-17 PROCEDURE — 99207 PR NO CHARGE LOS: CPT

## 2022-03-17 RX ORDER — HEPARIN SODIUM,PORCINE 10 UNIT/ML
5 VIAL (ML) INTRAVENOUS
Status: CANCELLED | OUTPATIENT
Start: 2022-05-12

## 2022-03-17 RX ORDER — HEPARIN SODIUM (PORCINE) LOCK FLUSH IV SOLN 100 UNIT/ML 100 UNIT/ML
5 SOLUTION INTRAVENOUS
Status: CANCELLED | OUTPATIENT
Start: 2022-05-12

## 2022-03-17 RX ADMIN — Medication 250 ML: at 14:46

## 2022-03-17 ASSESSMENT — PAIN SCALES - GENERAL: PAINLEVEL: NO PAIN (0)

## 2022-03-17 NOTE — PROGRESS NOTES
Infusion Nursing Note:  Onel White presents today for Entyvio.    Patient seen by provider today: No   present during visit today: Not Applicable.    Note: Pt denies any medical concerns. The pt reports tolerating their treatments well.    Intravenous Access:  Peripheral IV placed.    Treatment Conditions:  Biological Infusion Checklist:  ~~~ NOTE: If the patient answers yes to any of the questions below, hold the infusion and contact ordering provider or on-call provider.    1. Have you recently had an elevated temperature, fever, chills, productive cough, coughing for 3 weeks or longer or hemoptysis, abnormal vital signs, night sweats,  chest pain or have you noticed a decrease in your appetite, unexplained weight loss or fatigue? No  2. Do you have any open wounds or new incisions? No  3. Do you have any recent or upcoming hospitalizations, surgeries or dental procedures? No  4. Do you currently have or recently have had any signs of illness or infection or are you on any antibiotics? No  5. Have you had any new, sudden or worsening abdominal pain? No  6. Have you or anyone in your household received a live vaccination in the past 4 weeks? Please note:  No live vaccines while on biologic/chemotherapy until 6 months after the last treatment.  Patient can receive the flu vaccine (shot only) and the pneumovax.  It is optimal for the patient to get these vaccines mid cycle, but they can be given at any time as long as it is not on the day of the infusion. No  7. Have you recently been diagnosed with any new nervous system diseases (ie. Multiple sclerosis, Guillain [place], seizures, neurological changes) or cancer diagnosis? No  8. Are you on any form of radiation or chemotherapy? No  9. Are you pregnant or breast feeding or do you have plans of pregnancy in the future? No  10. Have you been having any signs of worsening depression or suicidal ideations?  (benlysta only) No  11. Have there been any other  new onset medical symptoms? No    Post Infusion Assessment:  Patient tolerated infusion without incident.  Site patent and intact, free from redness, edema or discomfort.  No evidence of extravasations.  Access discontinued per protocol.  Biologic Infusion Post Education: Call the triage nurse at your clinic or seek medical attention if you have chills and/or temperature greater than or equal to 100.5, uncontrolled nausea/vomiting, diarrhea, constipation, dizziness, shortness of breath, chest pain, heart palpitations, weakness or any other new or concerning symptoms, questions or concerns.  You cannot have any live virus vaccines prior to or during treatment or up to 6 months post infusion.  If you have an upcoming surgery, medical procedure or dental procedure during treatment, this should be discussed with your ordering physician and your surgeon/dentist.  If you are having any concerning symptom, if you are unsure if you should get your next infusion or wish to speak to a provider before your next infusion, please call your care coordinator or triage nurse at your clinic to notify them so we can adequately serve you.       Discharge Plan:   AVS to patient via [employer].  Patient directed to scheduling to make future appointments.   Patient discharged in stable condition accompanied by: self.  Departure Mode: Ambulatory.      Ashtyn Sosa RN

## 2022-05-17 ENCOUNTER — INFUSION THERAPY VISIT (OUTPATIENT)
Dept: INFUSION THERAPY | Facility: CLINIC | Age: 32
End: 2022-05-17
Payer: COMMERCIAL

## 2022-05-17 VITALS
BODY MASS INDEX: 27.09 KG/M2 | OXYGEN SATURATION: 99 % | TEMPERATURE: 98.1 F | SYSTOLIC BLOOD PRESSURE: 137 MMHG | RESPIRATION RATE: 16 BRPM | WEIGHT: 211 LBS | DIASTOLIC BLOOD PRESSURE: 84 MMHG | HEART RATE: 78 BPM

## 2022-05-17 DIAGNOSIS — K51.011 ULCERATIVE PANCOLITIS WITH RECTAL BLEEDING (H): Primary | ICD-10-CM

## 2022-05-17 PROCEDURE — 96365 THER/PROPH/DIAG IV INF INIT: CPT | Performed by: NURSE PRACTITIONER

## 2022-05-17 PROCEDURE — 99207 PR NO CHARGE LOS: CPT

## 2022-05-17 RX ORDER — HEPARIN SODIUM (PORCINE) LOCK FLUSH IV SOLN 100 UNIT/ML 100 UNIT/ML
5 SOLUTION INTRAVENOUS
Status: CANCELLED | OUTPATIENT
Start: 2022-07-07

## 2022-05-17 RX ORDER — HEPARIN SODIUM,PORCINE 10 UNIT/ML
5 VIAL (ML) INTRAVENOUS
Status: CANCELLED | OUTPATIENT
Start: 2022-07-07

## 2022-05-17 RX ADMIN — Medication 250 ML: at 16:10

## 2022-05-17 ASSESSMENT — PAIN SCALES - GENERAL: PAINLEVEL: SEVERE PAIN (6)

## 2022-05-17 NOTE — PROGRESS NOTES
Infusion Nursing Note:  Onel White presents today for Entyvio.    Patient seen by provider today: No   present during visit today: Not Applicable.    Note: Patient reports he has been dealing with a pinched nerve but otherwise is feeling well today. States he has been tolerating infusions.      Intravenous Access:  Peripheral IV placed.    Treatment Conditions:  Biological Infusion Checklist:  ~~~ NOTE: If the patient answers yes to any of the questions below, hold the infusion and contact ordering provider or on-call provider.    1. Have you recently had an elevated temperature, fever, chills, productive cough, coughing for 3 weeks or longer or hemoptysis, abnormal vital signs, night sweats,  chest pain or have you noticed a decrease in your appetite, unexplained weight loss or fatigue? No  2. Do you have any open wounds or new incisions? No  3. Do you have any recent or upcoming hospitalizations, surgeries or dental procedures? No  4. Do you currently have or recently have had any signs of illness or infection or are you on any antibiotics? No  5. Have you had any new, sudden or worsening abdominal pain? No  6. Have you or anyone in your household received a live vaccination in the past 4 weeks? Please note:  No live vaccines while on biologic/chemotherapy until 6 months after the last treatment.  Patient can receive the flu vaccine (shot only) and the pneumovax.  It is optimal for the patient to get these vaccines mid cycle, but they can be given at any time as long as it is not on the day of the infusion. No  7. Have you recently been diagnosed with any new nervous system diseases (ie. Multiple sclerosis, Guillain [place], seizures, neurological changes) or cancer diagnosis? No  8. Are you on any form of radiation or chemotherapy? No  9. Have there been any other new onset medical symptoms? No    Post Infusion Assessment:  Patient tolerated infusion without incident.  Site patent and intact, free  from redness, edema or discomfort.  No evidence of extravasations.  Access discontinued per protocol.       Discharge Plan:   AVS to patient via MYCHART.  Patient will return in 8 weeks for next appointment.   Patient discharged in stable condition accompanied by: self.  Departure Mode: Ambulatory.      Carmen Okeefe RN

## 2022-07-14 ENCOUNTER — INFUSION THERAPY VISIT (OUTPATIENT)
Dept: INFUSION THERAPY | Facility: CLINIC | Age: 32
End: 2022-07-14
Payer: COMMERCIAL

## 2022-07-14 ENCOUNTER — LAB (OUTPATIENT)
Dept: LAB | Facility: CLINIC | Age: 32
End: 2022-07-14
Payer: COMMERCIAL

## 2022-07-14 VITALS
DIASTOLIC BLOOD PRESSURE: 79 MMHG | BODY MASS INDEX: 26.32 KG/M2 | WEIGHT: 205 LBS | OXYGEN SATURATION: 100 % | HEART RATE: 63 BPM | SYSTOLIC BLOOD PRESSURE: 133 MMHG | TEMPERATURE: 98 F

## 2022-07-14 DIAGNOSIS — K51.011 ULCERATIVE PANCOLITIS WITH RECTAL BLEEDING (H): Primary | ICD-10-CM

## 2022-07-14 LAB
ALBUMIN SERPL-MCNC: 4.5 G/DL (ref 3.4–5)
ALP SERPL-CCNC: 95 U/L (ref 40–150)
ALT SERPL W P-5'-P-CCNC: 56 U/L (ref 0–70)
AST SERPL W P-5'-P-CCNC: 30 U/L (ref 0–45)
BASOPHILS # BLD AUTO: 0.1 10E3/UL (ref 0–0.2)
BASOPHILS NFR BLD AUTO: 1 %
BILIRUB DIRECT SERPL-MCNC: 0.3 MG/DL (ref 0–0.2)
BILIRUB SERPL-MCNC: 1.2 MG/DL (ref 0.2–1.3)
CRP SERPL-MCNC: <2.9 MG/L (ref 0–8)
EOSINOPHIL # BLD AUTO: 0.4 10E3/UL (ref 0–0.7)
EOSINOPHIL NFR BLD AUTO: 4 %
ERYTHROCYTE [DISTWIDTH] IN BLOOD BY AUTOMATED COUNT: 11.9 % (ref 10–15)
ERYTHROCYTE [SEDIMENTATION RATE] IN BLOOD BY WESTERGREN METHOD: 3 MM/HR (ref 0–15)
HCT VFR BLD AUTO: 46 % (ref 40–53)
HGB BLD-MCNC: 16 G/DL (ref 13.3–17.7)
IMM GRANULOCYTES # BLD: 0 10E3/UL
IMM GRANULOCYTES NFR BLD: 1 %
LYMPHOCYTES # BLD AUTO: 2 10E3/UL (ref 0.8–5.3)
LYMPHOCYTES NFR BLD AUTO: 22 %
MCH RBC QN AUTO: 31.4 PG (ref 26.5–33)
MCHC RBC AUTO-ENTMCNC: 34.8 G/DL (ref 31.5–36.5)
MCV RBC AUTO: 90 FL (ref 78–100)
MONOCYTES # BLD AUTO: 0.7 10E3/UL (ref 0–1.3)
MONOCYTES NFR BLD AUTO: 8 %
NEUTROPHILS # BLD AUTO: 5.7 10E3/UL (ref 1.6–8.3)
NEUTROPHILS NFR BLD AUTO: 64 %
NRBC # BLD AUTO: 0 10E3/UL
NRBC BLD AUTO-RTO: 0 /100
PLATELET # BLD AUTO: 211 10E3/UL (ref 150–450)
PROT SERPL-MCNC: 7.8 G/DL (ref 6.8–8.8)
RBC # BLD AUTO: 5.1 10E6/UL (ref 4.4–5.9)
WBC # BLD AUTO: 8.9 10E3/UL (ref 4–11)

## 2022-07-14 PROCEDURE — 86140 C-REACTIVE PROTEIN: CPT | Performed by: INTERNAL MEDICINE

## 2022-07-14 PROCEDURE — 80076 HEPATIC FUNCTION PANEL: CPT | Performed by: INTERNAL MEDICINE

## 2022-07-14 PROCEDURE — 85652 RBC SED RATE AUTOMATED: CPT | Performed by: INTERNAL MEDICINE

## 2022-07-14 PROCEDURE — 99207 PR NO CHARGE LOS: CPT

## 2022-07-14 PROCEDURE — 85025 COMPLETE CBC W/AUTO DIFF WBC: CPT | Performed by: INTERNAL MEDICINE

## 2022-07-14 PROCEDURE — 36415 COLL VENOUS BLD VENIPUNCTURE: CPT | Performed by: INTERNAL MEDICINE

## 2022-07-14 PROCEDURE — 96365 THER/PROPH/DIAG IV INF INIT: CPT | Performed by: NURSE PRACTITIONER

## 2022-07-14 RX ORDER — HEPARIN SODIUM (PORCINE) LOCK FLUSH IV SOLN 100 UNIT/ML 100 UNIT/ML
5 SOLUTION INTRAVENOUS
Status: CANCELLED | OUTPATIENT
Start: 2022-09-06

## 2022-07-14 RX ORDER — HEPARIN SODIUM,PORCINE 10 UNIT/ML
5 VIAL (ML) INTRAVENOUS
Status: CANCELLED | OUTPATIENT
Start: 2022-09-06

## 2022-07-14 RX ADMIN — Medication 250 ML: at 14:04

## 2022-07-14 ASSESSMENT — PAIN SCALES - GENERAL: PAINLEVEL: NO PAIN (0)

## 2022-07-14 NOTE — PROGRESS NOTES
Infusion Nursing Note:  Onel hWite presents today for Entyvio.    Patient seen by provider today: No   present during visit today: Not Applicable.    Note: N/A.    Intravenous Access:  Peripheral IV placed.    Treatment Conditions:  Lab Results   Component Value Date    HGB 16.0 07/14/2022    WBC 8.9 07/14/2022    ANEU 4.3 06/12/2021    ANEUTAUTO 5.7 07/14/2022     07/14/2022      Lab Results   Component Value Date     06/12/2021    POTASSIUM 4.1 06/12/2021    CR 0.89 06/12/2021    SASHA 8.8 06/12/2021    BILITOTAL 1.2 07/14/2022    ALBUMIN 4.5 07/14/2022    ALT 56 07/14/2022    AST 30 07/14/2022     Results reviewed, labs MET treatment parameters, ok to proceed with treatment.  Biological Infusion Checklist:  ~~~ NOTE: If the patient answers yes to any of the questions below, hold the infusion and contact ordering provider or on-call provider.    1. Have you recently had an elevated temperature, fever, chills, productive cough, coughing for 3 weeks or longer or hemoptysis, abnormal vital signs, night sweats,  chest pain or have you noticed a decrease in your appetite, unexplained weight loss or fatigue? No  2. Do you have any open wounds or new incisions? No  3. Do you have any recent or upcoming hospitalizations, surgeries or dental procedures? No  4. Do you currently have or recently have had any signs of illness or infection or are you on any antibiotics? No  5. Have you had any new, sudden or worsening abdominal pain? No  6. Have you or anyone in your household received a live vaccination in the past 4 weeks? Please note:  No live vaccines while on biologic/chemotherapy until 6 months after the last treatment.  Patient can receive the flu vaccine (shot only) and the pneumovax.  It is optimal for the patient to get these vaccines mid cycle, but they can be given at any time as long as it is not on the day of the infusion. No  7. Have you recently been diagnosed with any new nervous  system diseases (ie. Multiple sclerosis, Guillain [place], seizures, neurological changes) or cancer diagnosis? No  8. Are you on any form of radiation or chemotherapy? No    9. Have you been having any signs of worsening depression or suicidal ideations?  (benlysta only) No  10. Have there been any other new onset medical symptoms? No      Post Infusion Assessment:  Patient tolerated infusion without incident.  Blood return noted pre and post infusion.  Site patent and intact, free from redness, edema or discomfort.  No evidence of extravasations.  Access discontinued per protocol.  Biologic Infusion Post Education: Call the triage nurse at your clinic or seek medical attention if you have chills and/or temperature greater than or equal to 100.5, uncontrolled nausea/vomiting, diarrhea, constipation, dizziness, shortness of breath, chest pain, heart palpitations, weakness or any other new or concerning symptoms, questions or concerns.  You cannot have any live virus vaccines prior to or during treatment or up to 6 months post infusion.  If you have an upcoming surgery, medical procedure or dental procedure during treatment, this should be discussed with your ordering physician and your surgeon/dentist.  If you are having any concerning symptom, if you are unsure if you should get your next infusion or wish to speak to a provider before your next infusion, please call your care coordinator or triage nurse at your clinic to notify them so we can adequately serve you.     Discharge Plan:   Discharge instructions reviewed with: Patient.  Patient and/or family verbalized understanding of discharge instructions and all questions answered.  Patient discharged in stable condition accompanied by: self.  Departure Mode: Ambulatory.      Kaykay Christianson RN

## 2022-08-23 ENCOUNTER — TELEPHONE (OUTPATIENT)
Dept: GASTROENTEROLOGY | Facility: CLINIC | Age: 32
End: 2022-08-23

## 2022-08-23 DIAGNOSIS — R10.84 ABDOMINAL PAIN, GENERALIZED: ICD-10-CM

## 2022-08-23 DIAGNOSIS — K51.011 ULCERATIVE PANCOLITIS WITH RECTAL BLEEDING (H): ICD-10-CM

## 2022-08-23 NOTE — TELEPHONE ENCOUNTER
Health Call Center    Phone Message    May a detailed message be left on voicemail: yes     Reason for Call: Medication Question or concern regarding medication   Prescription Clarification  Name of Medication: dicyclomine (BENTYL) 20 MG tablet    Prescribing Provider: Dr. Liang Hancock     Pharmacy: [facility] #[id number]     What on the order needs clarification? Patient called stating RX should have been sent to [facility] #2019, NOT [facility] pharmacy.  Please resend to [facility] #2019.    Action Taken: Message routed to:  [facility]    Travel Screening: Not Applicable

## 2022-08-24 RX ORDER — DICYCLOMINE HCL 20 MG
20 TABLET ORAL 3 TIMES DAILY PRN
Qty: 30 TABLET | Refills: 0 | Status: SHIPPED | OUTPATIENT
Start: 2022-08-24 | End: 2022-11-14

## 2022-08-24 NOTE — TELEPHONE ENCOUNTER
Order resent to [facility] per patient request.    Patient contacted, informed that Rx has been sent to preferred pharmacy.  States he is starting to feel better but is still having some cramping.  Patient states that he did follow provider recommendation of a day of liquid diet.  States when symptoms were the worst he was having 15-20 episodes of severe diffuse, generalized abdominal cramping 15-20 times during the night.  The pain would last for a bit, go away and then return with an urge to use the restroom.  When he would go, the stool would be loose and a small amount, perhaps the amount of a [place] cup.  The symptoms were far less frequent during the day while working.  Patient states that just last night he was able to pass a larger amount of stool but is still loose.  When having to strain to try to pass stool was noting bright red blood.  Today, now that he is able to pass stool more easily he has noted just a small amount of blood,  perhaps a few drops.  Patient denies fevers or chills.      Reviewed with patient if pain becomes severe, notes fever/chills, develops nausea/vomiting and inability to tolerate PO intake to be evaluated in ER.     Forwarding to provider to review/advise.     Melissa Brantley RN

## 2022-08-25 NOTE — TELEPHONE ENCOUNTER
Liang Hancock MD  You 5 minutes ago (4:40 PM)     Continue with conservative therapy.  I placed orders for stool studies which he should get if symptoms are continuing.      MyChart message to patient with provider advice.      Melissa Brantley RN

## 2022-09-02 NOTE — ANESTHESIA PREPROCEDURE EVALUATION
Anesthesia Pre-Procedure Evaluation    Patient: Onel White   MRN: [id number] : 1990        Preoperative Diagnosis: Abnormal LFTs [R94.5]    Procedure : Procedure(s):  NEEDLE BIOPSY, LIVER, PERCUTANEOUS          Past Medical History:   Diagnosis Date     Abdominal pain, generalized      Anemia, unspecified      Diarrhea      Immunosuppression (H) 2021     Other acne      Ulcerative (chronic) enterocolitis (H) 8/3/2009     Ulcerative colitis     followed by [facility], [facility]     Ulcerative pancolitis with rectal bleeding (H) 2020     Uncomplicated asthma       Past Surgical History:   Procedure Laterality Date     COLONOSCOPY       COLONOSCOPY N/A 2019    Procedure: Combined Colonoscopy, Single Or Multiple Biopsy/Polypectomy By Biopsy;  Surgeon: Liang Hancock MD;  Location: [facility] OR     COLONOSCOPY N/A 2020    Procedure: Colonoscopy, With Polypectomy And Biopsy;  Surgeon: Liang Hancock MD;  Location: [facility] OR     COLONOSCOPY N/A 3/4/2021    Procedure: Colonoscopy, With Polypectomy And Biopsy;  Surgeon: Liang Hancock MD;  Location: [facility] OR     COLONOSCOPY WITH CO2 INSUFFLATION N/A 2019    Procedure: COLONOSCOPY WITH CO2 INSUFFLATION;  Surgeon: Liang Hancock MD;  Location: [facility] OR     COLONOSCOPY WITH CO2 INSUFFLATION N/A 2020    Procedure: COLONOSCOPY, WITH CO2 INSUFFLATION;  Surgeon: Liang Hancock MD;  Location: [facility] OR     COLONOSCOPY WITH CO2 INSUFFLATION N/A 3/4/2021    Procedure: COLONOSCOPY, WITH CO2 INSUFFLATION;  Surgeon: Liang Hancock MD;  Location: [facility] OR     ZZC APPENDECTOMY,RUPT APPENDX+ABSCESS  2006    peritonitis      Allergies   Allergen Reactions     Wheat       Social History     Tobacco Use     Smoking status: Former Smoker     Types: Cigarettes     Quit date: 2012     Years since quittin.5     Smokeless tobacco: Never Used   Substance Use Topics     Alcohol use:  Tori Fregoso,  at [place] [id number]     Christiano [place]  09/02/22 [id number] Yes     Alcohol/week: 20.0 standard drinks     Types: 20 Standard drinks or equivalent per week     Comment: one after work every day and weekends 5-6 each weekend day      Wt Readings from Last 1 Encounters:   12/30/21 88.5 kg (195 lb)        Anesthesia Evaluation            ROS/MED HX  ENT/Pulmonary:     (+) asthma     Neurologic:       Cardiovascular:       METS/Exercise Tolerance:     Hematologic:       Musculoskeletal:       GI/Hepatic:     (+) Inflammatory bowel disease,     Renal/Genitourinary:       Endo:       Psychiatric/Substance Use:       Infectious Disease:       Malignancy:       Other:            Physical Exam    Airway        Mallampati: II       Respiratory Devices and Support         Dental           Cardiovascular          Rhythm and rate: regular     Pulmonary           breath sounds clear to auscultation           OUTSIDE LABS:  CBC:   Lab Results   Component Value Date    WBC 7.4 11/23/2021    WBC 7.4 09/24/2021    HGB 14.7 11/23/2021    HGB 15.3 09/24/2021    HCT 43.9 11/23/2021    HCT 44.4 09/24/2021     11/23/2021     09/24/2021     BMP:   Lab Results   Component Value Date     06/12/2021     08/31/2020    POTASSIUM 4.1 06/12/2021    POTASSIUM 3.7 08/31/2020    CHLORIDE 105 06/12/2021    CHLORIDE 103 08/31/2020    CO2 29 06/12/2021    CO2 28 08/31/2020    BUN 17 06/12/2021    BUN 10 08/31/2020    CR 0.89 06/12/2021    CR 0.91 08/31/2020    GLC 90 06/12/2021    GLC 70 08/31/2020     COAGS:   Lab Results   Component Value Date    PTT 30 04/13/2009    INR 1.00 04/13/2009     POC: No results found for: BGM, HCG, HCGS  HEPATIC:   Lab Results   Component Value Date    ALBUMIN 3.9 11/23/2021    PROTTOTAL 7.3 11/23/2021     (H) 11/23/2021     (H) 11/23/2021    GGT 29 11/10/2010    ALKPHOS 198 (H) 11/23/2021    BILITOTAL 0.5 11/23/2021     OTHER:   Lab Results   Component Value Date    SASHA 8.8 06/12/2021    PHOS 4.6 11/09/2009    LIPASE 149 06/15/2020    TSH  2.01 01/15/2008    CRP <2.9 11/23/2021    SED 5 11/23/2021       Anesthesia Plan    ASA Status:  2   NPO Status:  NPO Appropriate    Anesthesia Type: MAC.              Consents    Anesthesia Plan(s) and associated risks, benefits, and realistic alternatives discussed. Questions answered and patient/representative(s) expressed understanding.    - Discussed:     - Discussed with:  Patient         Postoperative Care            Comments:           H&P reviewed: Unable to attach H&P to encounter due to EHR limitations. H&P Update: appropriate H&P reviewed, patient examined. No interval changes since H&P (within 30 days).         Mohsen Renteria MD

## 2022-09-08 ENCOUNTER — INFUSION THERAPY VISIT (OUTPATIENT)
Dept: INFUSION THERAPY | Facility: CLINIC | Age: 32
End: 2022-09-08
Payer: COMMERCIAL

## 2022-09-08 VITALS
HEIGHT: 75 IN | TEMPERATURE: 98.7 F | SYSTOLIC BLOOD PRESSURE: 143 MMHG | WEIGHT: 201 LBS | HEART RATE: 81 BPM | BODY MASS INDEX: 24.99 KG/M2 | OXYGEN SATURATION: 97 % | DIASTOLIC BLOOD PRESSURE: 84 MMHG

## 2022-09-08 DIAGNOSIS — K51.011 ULCERATIVE PANCOLITIS WITH RECTAL BLEEDING (H): Primary | ICD-10-CM

## 2022-09-08 PROCEDURE — 99207 PR NO CHARGE LOS: CPT

## 2022-09-08 PROCEDURE — 96365 THER/PROPH/DIAG IV INF INIT: CPT | Performed by: NURSE PRACTITIONER

## 2022-09-08 RX ORDER — HEPARIN SODIUM,PORCINE 10 UNIT/ML
5 VIAL (ML) INTRAVENOUS
Status: CANCELLED | OUTPATIENT
Start: 2022-11-03

## 2022-09-08 RX ORDER — HEPARIN SODIUM (PORCINE) LOCK FLUSH IV SOLN 100 UNIT/ML 100 UNIT/ML
5 SOLUTION INTRAVENOUS
Status: CANCELLED | OUTPATIENT
Start: 2022-11-03

## 2022-09-08 RX ADMIN — Medication 250 ML: at 14:05

## 2022-09-08 ASSESSMENT — PAIN SCALES - GENERAL: PAINLEVEL: NO PAIN (0)

## 2022-09-08 NOTE — PROGRESS NOTES
Infusion Nursing Note:  Onel White presents today for entyvio.    Patient seen by provider today: No   present during visit today: Not Applicable.    Note: Onel reports he has no primary care physician. He is having ocassional headaches. Blood pressure a bit elevated today. He also reports some trouble staying asleep at night. He also has about 6 lumps of various sizes around his body. He reports these are not new. He has reported them to provider (unspecific) and told to watch them.  Encouraged him to get himself a primary care doctor. In the meantime, I will message Dr Hancock team so they are aware and can follow up with Onel.    Intravenous Access:  Peripheral IV placed.    Treatment Conditions:  Biological Infusion Checklist:  ~~~ NOTE: If the patient answers yes to any of the questions below, hold the infusion and contact ordering provider or on-call provider.    1. Have you recently had an elevated temperature, fever, chills, productive cough, coughing for 3 weeks or longer or hemoptysis, abnormal vital signs, night sweats,  chest pain or have you noticed a decrease in your appetite, unexplained weight loss or fatigue? No  2. Do you have any open wounds or new incisions? No  3. Do you have any recent or upcoming hospitalizations, surgeries or dental procedures? No  4. Do you currently have or recently have had any signs of illness or infection or are you on any antibiotics? No  5. Have you had any new, sudden or worsening abdominal pain? No  6. Have you or anyone in your household received a live vaccination in the past 4 weeks? Please note:  No live vaccines while on biologic/chemotherapy until 6 months after the last treatment.  Patient can receive the flu vaccine (shot only) and the pneumovax.  It is optimal for the patient to get these vaccines mid cycle, but they can be given at any time as long as it is not on the day of the infusion. No  7. Have you recently been diagnosed with any new  nervous system diseases (ie. Multiple sclerosis, Guillain [place], seizures, neurological changes) or cancer diagnosis? Yes, headaches occassionally. He has no primary physician. I encouraged him to get one with his family history of high blood pressure. Dr Hancock notified in the meantime.    8. Are you on any form of radiation or chemotherapy? No  9. Are you pregnant or breast feeding or do you have plans of pregnancy in the future? N/A  10. Have you been having any signs of worsening depression or suicidal ideations?  N/A  11. Have there been any other new onset medical symptoms? No      Post Infusion Assessment:  Patient tolerated infusion without incident.  Blood return noted pre and post infusion.  Site patent and intact, free from redness, edema or discomfort.  No evidence of extravasations.  Access discontinued per protocol.     Discharge Plan:   Patient discharged in stable condition accompanied by: self.  Departure Mode: Ambulatory.  Writer called to remind him to schedule future appointments. He said he received a phone call at discharge and will call scheduling line to make entyvio appointment.      Saundra Woods RN

## 2022-10-09 ENCOUNTER — HEALTH MAINTENANCE LETTER (OUTPATIENT)
Age: 32
End: 2022-10-09

## 2022-11-01 ENCOUNTER — VIRTUAL VISIT (OUTPATIENT)
Dept: GASTROENTEROLOGY | Facility: CLINIC | Age: 32
End: 2022-11-01
Payer: COMMERCIAL

## 2022-11-01 VITALS — WEIGHT: 195 LBS | BODY MASS INDEX: 24.37 KG/M2

## 2022-11-01 DIAGNOSIS — K51.011 ULCERATIVE PANCOLITIS WITH RECTAL BLEEDING (H): Primary | ICD-10-CM

## 2022-11-01 PROCEDURE — 99213 OFFICE O/P EST LOW 20 MIN: CPT | Mod: 95 | Performed by: INTERNAL MEDICINE

## 2022-11-01 ASSESSMENT — PAIN SCALES - GENERAL: PAINLEVEL: NO PAIN (0)

## 2022-11-01 NOTE — PROGRESS NOTES
GASTROENTEROLOGY Telephone Follow up visit  [facility]    CC/REFERRING MD:    No Ref-Primary, Physician    HISTORY OF PRESENT ILLNESS:    Onel White is a 31 year old male who is being evaluated via a billable telephone visit.      We did follow-up today for ulcerative colitis.  He has longstanding ulcerative pancolitis since 2009 and is on vedolizumab every 8 weeks.  We last talked with him in December 2021.  He reports that he has been feeling well.  He has no abdominal pain, no blood in the stool, no loose stools.  He has no current eye symptoms, joint pains or skin rashes.  Since the time the last visit, he did get an updated MRCP.  This showed prominent intrahepatic biliary ducts greatest in the left hepatic lobe, no findings strongly suggestive of PSC and hepatomegaly was present.  He subsequently got liver biopsy.  Liver biopsy showed focal periductal fibrosis of the large ducts.  There is not any significant ductopenia.  The biopsies are subtle for possible early stage PSC.  He reports that he has stopped drinking alcohol heavily though he still has a few drinks a few times per week.  No other changes in health.  He has been noted to have elevated blood pressures at his infusions.  He has also been noted to have some questions about small subcutaneous nodules at his infusions.      I have reviewed and updated the patient's Past Medical History, Social History, Family History and Medication List.    ALLERGIES  Wheat    PERTINENT STUDIES have been reviewed.    Age at diagnosis:  Dx 2009  Extent of disease:  Pancolitis  Current UC medications:  Vedolizumab q8wk  Prior UC surgeries:  none  Prior IBD Medications:  Sulfasalazine, steroids, Lialda        DRUG MONITORING  TPMT enzyme activity:   Phenotype normal, level 33     6-TGN/6-MMPN levels:  none     Biologic concentration:  none     DISEASE ASSESSMENT  Labs:          Recent Labs   Lab Test 10/16/20  1344 09/15/20  1630   CRP <2.9 <2.9   SED 7 5       Endoscopic assessment:   Colonoscopy March 2021 with pseudopolyposis in the sigmoid colon, descending colon and transverse colon.  Ortega grade 0 activity.  14 mm polyp in the ascending colon which was removed.  Polyp was inflammatory with focal adenomatous change.  Remainder of biopsy showed Gwen grade 0.  Enterography: none  Fecal calprotectin: June 2020 was 1060  C diff: none   liver evaluation: prominent intrahepatic biliary ducts greatest in the left hepatic lobe, no findings strongly suggestive of PSC and hepatomegaly. Liver biopsy showed focal periductal fibrosis of the large ducts.  There is not any significant ductopenia.  The biopsies are subtle for possible early stage PSC.  sIBDQ:  No flowsheet data found.      IBD Health Care Maintenance:     Vaccinations:  All patients on biologics should avoid live vaccines.    -- Influenza (every year)  -- TdaP (every 10 years)  -- Pneumococcal Pneumonia (once plus booster at 5 years)  -- Yearly assessment for latent Tb (verbal screening and exam, PPD or QuantiFERON-Tb testing)     One time confirmation of immunity or serologies:  -- Hepatitis A (serologies or immunizations)  -- Hepatitis B (serologies or immunizations)  -- Varicella  -- MMR  -- HPV (all aged 18-26)  -- Meningococcal meningitis (all patients at risk for meningitis)-- Due to the immunosuppression in this patient, I would not advise administration of live vaccines such as varicella/VZV, intranasal influenza, MMR, or yellow fever vaccine (if travelling).       Bone mineral density screening   -- Recommend all patients supplement with calcium and vitamin D  -- Given prior steroid use recommend DEXA if not already done     Cancer Screening:  Colon cancer screening:  Given pancoltis, recommend patient undergo regular dysplasia surveillance   Next dysplasia screening is recommended March 2021.     Cervical cancer screening: N/A     Skin cancer screening: Annual visual exam of skin by dermatologist since  patient is immunocompromised     Depression Screening:  -- Over the last month, have you felt down, depressed, or hopeless? no  -- Over the last month, have you felt little interest or pleasure doing things? no     Misc:  -- Avoid tobacco use  -- Avoid NSAIDs as there is potentially a 25% chance of causing an IBD flare        ASSESSMENT/PLAN:    Onel White is a 31 year old male who presents for follow up of ulcerative pancolitis.  He is in clinical remission on vedolizumab every 8 weeks.  He did have a flareup of symptoms over the summer but that has since resolved.  We will plan to continue the current therapy with vedolizumab.  I have recommended a another colonoscopy for dysplasia screening purposes and that will be completed with MAC at [facility].  Regarding abnormal liver test, these have normalized with moderation of alcohol intake.  MRCP as well as biopsy was indeterminate for PSC.  We will continue to monitor LFTs over the long-term.  Regardless, I would favor colonoscopy every year to every 2 years given the prior inflammatory polyp with focal adenomatous change.  Finally, I have recommended that he establish care with primary care to address issues with blood pressure concerns.  I have also recommended that he follow regular masking protocols at the infusion center.  He is in agreement with all of these plans.  We will follow-up with him at the time of the colonoscopy and then back in the office in 6 months time.    Phone call duration: 25 minutes      RTC 6 months    Thank you for this consultation.  It was a pleasure to participate in the care of this patient; please contact us with any further questions.      This note was created with voice recognition software, and while reviewed for accuracy, typos may remain.     Liang Hancock MD  Division of Gastroenterology, Hepatology and Nutrition  [facility]  [phone]

## 2022-11-14 ENCOUNTER — INFUSION THERAPY VISIT (OUTPATIENT)
Dept: INFUSION THERAPY | Facility: CLINIC | Age: 32
End: 2022-11-14
Payer: COMMERCIAL

## 2022-11-14 ENCOUNTER — LAB (OUTPATIENT)
Dept: LAB | Facility: CLINIC | Age: 32
End: 2022-11-14
Payer: COMMERCIAL

## 2022-11-14 VITALS
BODY MASS INDEX: 26.04 KG/M2 | RESPIRATION RATE: 14 BRPM | TEMPERATURE: 98 F | HEART RATE: 52 BPM | DIASTOLIC BLOOD PRESSURE: 83 MMHG | SYSTOLIC BLOOD PRESSURE: 133 MMHG | OXYGEN SATURATION: 98 % | WEIGHT: 208.3 LBS

## 2022-11-14 DIAGNOSIS — K51.011 ULCERATIVE PANCOLITIS WITH RECTAL BLEEDING (H): Primary | ICD-10-CM

## 2022-11-14 LAB
ALBUMIN SERPL-MCNC: 4.1 G/DL (ref 3.4–5)
ALP SERPL-CCNC: 94 U/L (ref 40–150)
ALT SERPL W P-5'-P-CCNC: 66 U/L (ref 0–70)
AST SERPL W P-5'-P-CCNC: 36 U/L (ref 0–45)
BASOPHILS # BLD AUTO: 0.1 10E3/UL (ref 0–0.2)
BASOPHILS NFR BLD AUTO: 1 %
BILIRUB DIRECT SERPL-MCNC: 0.2 MG/DL (ref 0–0.2)
BILIRUB SERPL-MCNC: 0.5 MG/DL (ref 0.2–1.3)
CRP SERPL-MCNC: <2.9 MG/L (ref 0–8)
EOSINOPHIL # BLD AUTO: 0.4 10E3/UL (ref 0–0.7)
EOSINOPHIL NFR BLD AUTO: 6 %
ERYTHROCYTE [DISTWIDTH] IN BLOOD BY AUTOMATED COUNT: 12 % (ref 10–15)
ERYTHROCYTE [SEDIMENTATION RATE] IN BLOOD BY WESTERGREN METHOD: 2 MM/HR (ref 0–15)
HCT VFR BLD AUTO: 46 % (ref 40–53)
HGB BLD-MCNC: 15.3 G/DL (ref 13.3–17.7)
HOLD SPECIMEN: NORMAL
IMM GRANULOCYTES # BLD: 0 10E3/UL
IMM GRANULOCYTES NFR BLD: 0 %
LYMPHOCYTES # BLD AUTO: 1.9 10E3/UL (ref 0.8–5.3)
LYMPHOCYTES NFR BLD AUTO: 28 %
MCH RBC QN AUTO: 30.6 PG (ref 26.5–33)
MCHC RBC AUTO-ENTMCNC: 33.3 G/DL (ref 31.5–36.5)
MCV RBC AUTO: 92 FL (ref 78–100)
MONOCYTES # BLD AUTO: 0.5 10E3/UL (ref 0–1.3)
MONOCYTES NFR BLD AUTO: 8 %
NEUTROPHILS # BLD AUTO: 3.9 10E3/UL (ref 1.6–8.3)
NEUTROPHILS NFR BLD AUTO: 57 %
NRBC # BLD AUTO: 0 10E3/UL
NRBC BLD AUTO-RTO: 0 /100
PLATELET # BLD AUTO: 226 10E3/UL (ref 150–450)
PROT SERPL-MCNC: 7.2 G/DL (ref 6.8–8.8)
RBC # BLD AUTO: 5 10E6/UL (ref 4.4–5.9)
WBC # BLD AUTO: 6.8 10E3/UL (ref 4–11)

## 2022-11-14 PROCEDURE — 80076 HEPATIC FUNCTION PANEL: CPT | Performed by: INTERNAL MEDICINE

## 2022-11-14 PROCEDURE — 85652 RBC SED RATE AUTOMATED: CPT | Performed by: INTERNAL MEDICINE

## 2022-11-14 PROCEDURE — 99207 PR NO CHARGE LOS: CPT

## 2022-11-14 PROCEDURE — 96365 THER/PROPH/DIAG IV INF INIT: CPT | Performed by: INTERNAL MEDICINE

## 2022-11-14 PROCEDURE — 85025 COMPLETE CBC W/AUTO DIFF WBC: CPT | Performed by: INTERNAL MEDICINE

## 2022-11-14 PROCEDURE — 86140 C-REACTIVE PROTEIN: CPT | Performed by: INTERNAL MEDICINE

## 2022-11-14 PROCEDURE — 36415 COLL VENOUS BLD VENIPUNCTURE: CPT | Performed by: INTERNAL MEDICINE

## 2022-11-14 RX ORDER — HEPARIN SODIUM,PORCINE 10 UNIT/ML
5 VIAL (ML) INTRAVENOUS
Status: CANCELLED | OUTPATIENT
Start: 2022-12-29

## 2022-11-14 RX ORDER — HEPARIN SODIUM (PORCINE) LOCK FLUSH IV SOLN 100 UNIT/ML 100 UNIT/ML
5 SOLUTION INTRAVENOUS
Status: CANCELLED | OUTPATIENT
Start: 2022-12-29

## 2022-11-14 RX ADMIN — Medication 250 ML: at 12:01

## 2022-11-14 NOTE — PROGRESS NOTES
Infusion Nursing Note:  Onel White presents today for Entyvio infusion.    Patient seen by provider today: No   present during visit today: Not Applicable.    Note: States lumps on abdomen/back have not changed since last infusion.    Still getting headaches a couple of times a week. Wears glasses for driving. /83. Patient acknowledges recommendation to see PCP for BP.  Patient wonders if elevated BP is due to daily energy drink consumption.    Intravenous Access:  Peripheral IV placed.    Treatment Conditions:  Biological Infusion Checklist:  ~~~ NOTE: If the patient answers yes to any of the questions below, hold the infusion and contact ordering provider or on-call provider.    1. Have you recently had an elevated temperature, fever, chills, productive cough, coughing for 3 weeks or longer or hemoptysis, abnormal vital signs, night sweats,  chest pain or have you noticed a decrease in your appetite, unexplained weight loss or fatigue? No  2. Do you have any open wounds or new incisions? No  3. Do you have any recent or upcoming hospitalizations, surgeries or dental procedures? No  4. Do you currently have or recently have had any signs of illness or infection or are you on any antibiotics? No  5. Have you had any new, sudden or worsening abdominal pain? No  6. Have you or anyone in your household received a live vaccination in the past 4 weeks? Please note:  No live vaccines while on biologic/chemotherapy until 6 months after the last treatment.  Patient can receive the flu vaccine (shot only) and the pneumovax.  It is optimal for the patient to get these vaccines mid cycle, but they can be given at any time as long as it is not on the day of the infusion. No  7. Have you recently been diagnosed with any new nervous system diseases (ie. Multiple sclerosis, Guillain [place], seizures, neurological changes) or cancer diagnosis? No  8. Are you on any form of radiation or chemotherapy? No  9. Have  there been any other new onset medical symptoms? No    Post Infusion Assessment:  Patient tolerated infusion without incident.  Blood return noted pre and post infusion.  Site patent and intact, free from redness, edema or discomfort.  No evidence of extravasations.  Access discontinued per protocol.     Discharge Plan:   AVS to patient via MYCHART.  Patient unable to schedule next appointment before leaving today, as he has another appointment right after this and it is snowing. Patient will call back to schedule next infusion.  Patient discharged in stable condition accompanied by: self.  Departure Mode: Ambulatory.      Anne-Marie Mcgovern RN

## 2022-12-19 DIAGNOSIS — J45.30 MILD PERSISTENT ASTHMA WITHOUT COMPLICATION: ICD-10-CM

## 2022-12-20 RX ORDER — ALBUTEROL SULFATE 90 UG/1
AEROSOL, METERED RESPIRATORY (INHALATION)
Qty: 18 G | Refills: 3 | OUTPATIENT
Start: 2022-12-20

## 2022-12-20 NOTE — TELEPHONE ENCOUNTER
Pending Prescriptions:                       Disp   Refills    albuterol (PROAIR HFA/PROVENTIL HFA/VENTOL*18 g   3        Sig: INHALE TWO PUFFS BY MOUTH EVERY 4 HOURS AS NEEDED FOR           SHORTNESS OF BREATH, DYSPNEA, OR WHEEZING      Routing refill request to provider for review/approval because:  Labs not current:    ACT Total Scores 9/27/2018 2/17/2021 12/2/2021   ACT TOTAL SCORE - - -   ASTHMA ER VISITS - - -   ASTHMA HOSPITALIZATIONS - - -   ACT TOTAL SCORE (Goal Greater than or Equal to 20) 19 17 25   In the past 12 months, how many times did you visit the emergency room for your asthma without being admitted to the hospital? 1 0 0   In the past 12 months, how many times were you hospitalized overnight because of your asthma? 0 0 0       Patient needs to be seen because it has been more than 1 year since last office visit.    Debbie Toney RN

## 2022-12-21 RX ORDER — ALBUTEROL SULFATE 90 UG/1
2 AEROSOL, METERED RESPIRATORY (INHALATION) EVERY 4 HOURS PRN
Qty: 18 G | Refills: 0 | Status: SHIPPED | OUTPATIENT
Start: 2022-12-21

## 2022-12-21 NOTE — TELEPHONE ENCOUNTER
Your inhaler refilled.  Please have patient follow-up in 1-2 months for physical and general follow-up.  Thank you

## 2023-01-18 ENCOUNTER — INFUSION THERAPY VISIT (OUTPATIENT)
Dept: INFUSION THERAPY | Facility: CLINIC | Age: 33
End: 2023-01-18
Payer: COMMERCIAL

## 2023-01-18 VITALS
HEART RATE: 73 BPM | DIASTOLIC BLOOD PRESSURE: 92 MMHG | WEIGHT: 204.4 LBS | OXYGEN SATURATION: 98 % | TEMPERATURE: 98.1 F | BODY MASS INDEX: 25.55 KG/M2 | SYSTOLIC BLOOD PRESSURE: 145 MMHG | RESPIRATION RATE: 16 BRPM

## 2023-01-18 DIAGNOSIS — K51.011 ULCERATIVE PANCOLITIS WITH RECTAL BLEEDING (H): Primary | ICD-10-CM

## 2023-01-18 PROCEDURE — 99207 PR NO CHARGE LOS: CPT

## 2023-01-18 PROCEDURE — 96365 THER/PROPH/DIAG IV INF INIT: CPT | Performed by: NURSE PRACTITIONER

## 2023-01-18 RX ORDER — HEPARIN SODIUM (PORCINE) LOCK FLUSH IV SOLN 100 UNIT/ML 100 UNIT/ML
5 SOLUTION INTRAVENOUS
Status: CANCELLED | OUTPATIENT
Start: 2023-03-06

## 2023-01-18 RX ORDER — HEPARIN SODIUM,PORCINE 10 UNIT/ML
5 VIAL (ML) INTRAVENOUS
Status: CANCELLED | OUTPATIENT
Start: 2023-03-06

## 2023-01-18 RX ADMIN — Medication 250 ML: at 14:56

## 2023-01-18 NOTE — PROGRESS NOTES
Infusion Nursing Note:  Onel White presents today for Entyvio.    Patient seen by provider today: No   present during visit today: Not Applicable.    Note: Patient's BP elevated today-noted as well with last infusion appointment. Patient reports he has not yet followed up with a PCP and was encouraged to do so. Patient verbalized understanding.    Intravenous Access:  Peripheral IV placed.    Treatment Conditions:  Biological Infusion Checklist:  ~~~ NOTE: If the patient answers yes to any of the questions below, hold the infusion and contact ordering provider or on-call provider.    1. Have you recently had an elevated temperature, fever, chills, productive cough, coughing for 3 weeks or longer or hemoptysis, abnormal vital signs, night sweats,  chest pain or have you noticed a decrease in your appetite, unexplained weight loss or fatigue? No  2. Do you have any open wounds or new incisions? No  3. Do you have any recent or upcoming hospitalizations, surgeries or dental procedures? No  4. Do you currently have or recently have had any signs of illness or infection or are you on any antibiotics? No  5. Have you had any new, sudden or worsening abdominal pain? No  6. Have you or anyone in your household received a live vaccination in the past 4 weeks? Please note:  No live vaccines while on biologic/chemotherapy until 6 months after the last treatment.  Patient can receive the flu vaccine (shot only) and the pneumovax.  It is optimal for the patient to get these vaccines mid cycle, but they can be given at any time as long as it is not on the day of the infusion. No  7. Have you recently been diagnosed with any new nervous system diseases (ie. Multiple sclerosis, Guillain [place], seizures, neurological changes) or cancer diagnosis? No  8. Are you on any form of radiation or chemotherapy? No  9. Are you pregnant or breast feeding or do you have plans of pregnancy in the future? No  10. Have you been  having any signs of worsening depression or suicidal ideations?  (benlysta only) No  11. Have there been any other new onset medical symptoms? No      Post Infusion Assessment:  Patient tolerated infusion without incident.  Site patent and intact, free from redness, edema or discomfort.  No evidence of extravasations.  Access discontinued per protocol.  Biologic Infusion Post Education: Call the triage nurse at your clinic or seek medical attention if you have chills and/or temperature greater than or equal to 100.5, uncontrolled nausea/vomiting, diarrhea, constipation, dizziness, shortness of breath, chest pain, heart palpitations, weakness or any other new or concerning symptoms, questions or concerns.  You cannot have any live virus vaccines prior to or during treatment or up to 6 months post infusion.  If you have an upcoming surgery, medical procedure or dental procedure during treatment, this should be discussed with your ordering physician and your surgeon/dentist.  If you are having any concerning symptom, if you are unsure if you should get your next infusion or wish to speak to a provider before your next infusion, please call your care coordinator or triage nurse at your clinic to notify them so we can adequately serve you.     Discharge Plan:   Future appts have been reviewed and crosschecked with appt note and plan.  AVS to patient via ImpulsivT.  Patient will return in 8 weeks for next appointment. Patient verbalized that he will stop at scheduling on the way out to schedule next appointment with labs prior.  Patient discharged in stable condition accompanied by: self.  Departure Mode: Ambulatory.      Carmen Okeefe RN

## 2023-02-18 ENCOUNTER — HEALTH MAINTENANCE LETTER (OUTPATIENT)
Age: 33
End: 2023-02-18

## 2023-03-15 ENCOUNTER — LAB (OUTPATIENT)
Dept: LAB | Facility: CLINIC | Age: 33
End: 2023-03-15
Payer: COMMERCIAL

## 2023-03-15 ENCOUNTER — INFUSION THERAPY VISIT (OUTPATIENT)
Dept: INFUSION THERAPY | Facility: CLINIC | Age: 33
End: 2023-03-15
Payer: COMMERCIAL

## 2023-03-15 VITALS
OXYGEN SATURATION: 96 % | RESPIRATION RATE: 16 BRPM | HEART RATE: 75 BPM | DIASTOLIC BLOOD PRESSURE: 76 MMHG | SYSTOLIC BLOOD PRESSURE: 135 MMHG | WEIGHT: 206 LBS | BODY MASS INDEX: 25.75 KG/M2 | TEMPERATURE: 98.8 F

## 2023-03-15 DIAGNOSIS — K51.011 ULCERATIVE PANCOLITIS WITH RECTAL BLEEDING (H): Primary | ICD-10-CM

## 2023-03-15 LAB
ALBUMIN SERPL-MCNC: 4.4 G/DL (ref 3.4–5)
ALP SERPL-CCNC: 108 U/L (ref 40–150)
ALT SERPL W P-5'-P-CCNC: 70 U/L (ref 0–70)
AST SERPL W P-5'-P-CCNC: 35 U/L (ref 0–45)
BASOPHILS # BLD AUTO: 0 10E3/UL (ref 0–0.2)
BASOPHILS NFR BLD AUTO: 1 %
BILIRUB DIRECT SERPL-MCNC: 0.2 MG/DL (ref 0–0.2)
BILIRUB SERPL-MCNC: 0.9 MG/DL (ref 0.2–1.3)
CRP SERPL-MCNC: <2.9 MG/L (ref 0–8)
EOSINOPHIL # BLD AUTO: 0.3 10E3/UL (ref 0–0.7)
EOSINOPHIL NFR BLD AUTO: 4 %
ERYTHROCYTE [DISTWIDTH] IN BLOOD BY AUTOMATED COUNT: 11.9 % (ref 10–15)
ERYTHROCYTE [SEDIMENTATION RATE] IN BLOOD BY WESTERGREN METHOD: 2 MM/HR (ref 0–15)
HCT VFR BLD AUTO: 44.8 % (ref 40–53)
HGB BLD-MCNC: 15.2 G/DL (ref 13.3–17.7)
HOLD SPECIMEN: NORMAL
IMM GRANULOCYTES # BLD: 0 10E3/UL
IMM GRANULOCYTES NFR BLD: 0 %
LYMPHOCYTES # BLD AUTO: 1.6 10E3/UL (ref 0.8–5.3)
LYMPHOCYTES NFR BLD AUTO: 23 %
MCH RBC QN AUTO: 31 PG (ref 26.5–33)
MCHC RBC AUTO-ENTMCNC: 33.9 G/DL (ref 31.5–36.5)
MCV RBC AUTO: 91 FL (ref 78–100)
MONOCYTES # BLD AUTO: 0.6 10E3/UL (ref 0–1.3)
MONOCYTES NFR BLD AUTO: 9 %
NEUTROPHILS # BLD AUTO: 4.2 10E3/UL (ref 1.6–8.3)
NEUTROPHILS NFR BLD AUTO: 63 %
NRBC # BLD AUTO: 0 10E3/UL
NRBC BLD AUTO-RTO: 0 /100
PLATELET # BLD AUTO: 201 10E3/UL (ref 150–450)
PROT SERPL-MCNC: 7.5 G/DL (ref 6.8–8.8)
RBC # BLD AUTO: 4.91 10E6/UL (ref 4.4–5.9)
WBC # BLD AUTO: 6.8 10E3/UL (ref 4–11)

## 2023-03-15 PROCEDURE — 85025 COMPLETE CBC W/AUTO DIFF WBC: CPT | Performed by: INTERNAL MEDICINE

## 2023-03-15 PROCEDURE — 85652 RBC SED RATE AUTOMATED: CPT | Performed by: INTERNAL MEDICINE

## 2023-03-15 PROCEDURE — 99207 PR NO CHARGE LOS: CPT

## 2023-03-15 PROCEDURE — 96365 THER/PROPH/DIAG IV INF INIT: CPT | Performed by: NURSE PRACTITIONER

## 2023-03-15 PROCEDURE — 80076 HEPATIC FUNCTION PANEL: CPT | Performed by: INTERNAL MEDICINE

## 2023-03-15 PROCEDURE — 36415 COLL VENOUS BLD VENIPUNCTURE: CPT | Performed by: INTERNAL MEDICINE

## 2023-03-15 PROCEDURE — 86140 C-REACTIVE PROTEIN: CPT | Performed by: INTERNAL MEDICINE

## 2023-03-15 RX ORDER — HEPARIN SODIUM (PORCINE) LOCK FLUSH IV SOLN 100 UNIT/ML 100 UNIT/ML
5 SOLUTION INTRAVENOUS
Status: CANCELLED | OUTPATIENT
Start: 2023-05-10

## 2023-03-15 RX ORDER — HEPARIN SODIUM,PORCINE 10 UNIT/ML
5 VIAL (ML) INTRAVENOUS
Status: CANCELLED | OUTPATIENT
Start: 2023-05-10

## 2023-03-15 RX ADMIN — Medication 250 ML: at 15:10

## 2023-03-15 ASSESSMENT — PAIN SCALES - GENERAL: PAINLEVEL: NO PAIN (0)

## 2023-03-15 NOTE — PROGRESS NOTES
"Infusion Nursing Note:  Onel White presents today for ***.    Patient seen by provider today: {YES (EXPLAIN)/NO:179494}   present during visit today: {UMCAMIGE:820132}    Note: {Not Applicable or free text:681325:s:\"N/A\"}.    Intravenous Access:  {UMHIVACCESS:118970}    Treatment Conditions:  Biological Infusion Checklist:  ~~~ NOTE: If the patient answers yes to any of the questions below, hold the infusion and contact ordering provider or on-call provider.    1. Have you recently had an elevated temperature, fever, chills, productive cough, coughing for 3 weeks or longer or hemoptysis, abnormal vital signs, night sweats,  chest pain or have you noticed a decrease in your appetite, unexplained weight loss or fatigue? {Y/N:201195}  2. Do you have any open wounds or new incisions? {Y/N:800701}  3. Do you have any recent or upcoming hospitalizations, surgeries or dental procedures? {Y/N:760130}  4. Do you currently have or recently have had any signs of illness or infection or are you on any antibiotics? {Y/N:077215}  5. Have you had any new, sudden or worsening abdominal pain? {Y/N:322818}  6. Have you or anyone in your household received a live vaccination in the past 4 weeks? Please note:  No live vaccines while on biologic/chemotherapy until 6 months after the last treatment.  Patient can receive the flu vaccine (shot only) and the pneumovax.  It is optimal for the patient to get these vaccines mid cycle, but they can be given at any time as long as it is not on the day of the infusion. {Y/N:076588}  7. Have you recently been diagnosed with any new nervous system diseases (ie. Multiple sclerosis, Guillain [place], seizures, neurological changes) or cancer diagnosis? {Y/N:717676}  8. Are you on any form of radiation or chemotherapy? {Y/N:808321}  9. Are you pregnant or breast feeding or do you have plans of pregnancy in the future? {Y/N:740511}  10. Have you been having any signs of worsening " depression or suicidal ideations?  (benlysta only) {Y/N:517808}  11. Have there been any other new onset medical symptoms? {Y/N:045041}      Post Infusion Assessment:  {UMHPOSTINFUSION:459431}     Discharge Plan:   {UMHDISCHARGE:739776}      Abby Bonds RN

## 2023-03-15 NOTE — PROGRESS NOTES
Infusion Nursing Note:  Onel White presents today for Entyvio.    Patient seen by provider today: No   present during visit today: Not Applicable.    Note: Onel stated he is doing well on Entyvio. He said he will be moving to [place] and will be changing his infusion center location with the move.     Intravenous Access:  Peripheral IV placed.    Treatment Conditions:  Biological Infusion Checklist:  ~~~ NOTE: If the patient answers yes to any of the questions below, hold the infusion and contact ordering provider or on-call provider.    1. Have you recently had an elevated temperature, fever, chills, productive cough, coughing for 3 weeks or longer or hemoptysis, abnormal vital signs, night sweats,  chest pain or have you noticed a decrease in your appetite, unexplained weight loss or fatigue? No  2. Do you have any open wounds or new incisions? No  3. Do you have any recent or upcoming hospitalizations, surgeries or dental procedures? No  4. Do you currently have or recently have had any signs of illness or infection or are you on any antibiotics? No  5. Have you had any new, sudden or worsening abdominal pain? No  6. Have you or anyone in your household received a live vaccination in the past 4 weeks? Please note:  No live vaccines while on biologic/chemotherapy until 6 months after the last treatment.  Patient can receive the flu vaccine (shot only) and the pneumovax.  It is optimal for the patient to get these vaccines mid cycle, but they can be given at any time as long as it is not on the day of the infusion. No  7. Have you recently been diagnosed with any new nervous system diseases (ie. Multiple sclerosis, Guillain [place], seizures, neurological changes) or cancer diagnosis? No  8. Are you on any form of radiation or chemotherapy? No  9. Have there been any other new onset medical symptoms? No      Post Infusion Assessment:  Patient tolerated infusion without incident.  Site patent and intact,  free from redness, edema or discomfort.  No evidence of extravasations.  Access discontinued per protocol.  Biologic Infusion Post Education: Call the triage nurse at your clinic or seek medical attention if you have chills and/or temperature greater than or equal to 100.5, uncontrolled nausea/vomiting, diarrhea, constipation, dizziness, shortness of breath, chest pain, heart palpitations, weakness or any other new or concerning symptoms, questions or concerns.  You cannot have any live virus vaccines prior to or during treatment or up to 6 months post infusion.  If you have an upcoming surgery, medical procedure or dental procedure during treatment, this should be discussed with your ordering physician and your surgeon/dentist.  If you are having any concerning symptom, if you are unsure if you should get your next infusion or wish to speak to a provider before your next infusion, please call your care coordinator or triage nurse at your clinic to notify them so we can adequately serve you.     Discharge Plan:   Discharge instructions reviewed with: Patient.  Patient and/or family verbalized understanding of discharge instructions and all questions answered.  Patient discharged in stable condition accompanied by: self.  Departure Mode: Ambulatory.        Debbie Cook RN

## 2023-05-11 ENCOUNTER — INFUSION THERAPY VISIT (OUTPATIENT)
Dept: INFUSION THERAPY | Facility: CLINIC | Age: 33
End: 2023-05-11
Payer: COMMERCIAL

## 2023-05-11 VITALS
BODY MASS INDEX: 25.79 KG/M2 | DIASTOLIC BLOOD PRESSURE: 81 MMHG | RESPIRATION RATE: 16 BRPM | HEART RATE: 64 BPM | SYSTOLIC BLOOD PRESSURE: 127 MMHG | OXYGEN SATURATION: 98 % | WEIGHT: 206.3 LBS

## 2023-05-11 DIAGNOSIS — K51.011 ULCERATIVE PANCOLITIS WITH RECTAL BLEEDING (H): Primary | ICD-10-CM

## 2023-05-11 PROCEDURE — 96365 THER/PROPH/DIAG IV INF INIT: CPT | Performed by: NURSE PRACTITIONER

## 2023-05-11 PROCEDURE — 99207 PR NO CHARGE LOS: CPT

## 2023-05-11 RX ORDER — HEPARIN SODIUM (PORCINE) LOCK FLUSH IV SOLN 100 UNIT/ML 100 UNIT/ML
5 SOLUTION INTRAVENOUS
Status: CANCELLED | OUTPATIENT
Start: 2023-07-05

## 2023-05-11 RX ORDER — HEPARIN SODIUM,PORCINE 10 UNIT/ML
5 VIAL (ML) INTRAVENOUS
Status: CANCELLED | OUTPATIENT
Start: 2023-07-05

## 2023-05-11 RX ADMIN — Medication 250 ML: at 15:24

## 2023-05-11 NOTE — PROGRESS NOTES
Infusion Nursing Note:  Onel White presents today for Entyvio.    Patient seen by provider today: No   present during visit today: Not Applicable.    Note: Patient reports no complications with previous infusions.    Intravenous Access:  Peripheral IV placed.    Treatment Conditions:  Lab Results   Component Value Date    HGB 15.2 03/15/2023    WBC 6.8 03/15/2023    ANEU 4.3 06/12/2021    ANEUTAUTO 4.2 03/15/2023     03/15/2023      Lab Results   Component Value Date     06/12/2021    POTASSIUM 4.1 06/12/2021    CR 0.89 06/12/2021    SASHA 8.8 06/12/2021    BILITOTAL 0.9 03/15/2023    ALBUMIN 4.4 03/15/2023    ALT 70 03/15/2023    AST 35 03/15/2023     ~~~ NOTE: If the patient answers yes to any of the questions below, hold the infusion and contact ordering provider or on-call provider.    1. Have you recently had an elevated temperature, fever, chills, productive cough, coughing for 3 weeks or longer or hemoptysis,  abnormal vital signs, night sweats,  chest pain or have you noticed a decrease in your appetite, unexplained weight loss or fatigue? No  2. Do you have any open wounds or new incisions? No  3. Do you have any upcoming hospitalizations or surgeries? Does not include esophagogastroduodenoscopy, colonoscopy, endoscopic retrograde cholangiopancreatography (ERCP), endoscopic ultrasound (EUS), dental procedures or joint aspiration/steroid injections No  4. Do you currently have any signs of illness or infection or are you on any antibiotics? No  5. Have you had any new, sudden or worsening abdominal pain? No  6. Have you or anyone in your household received a live vaccination in the past 4 weeks? Please note: No live vaccines while on biologic/chemotherapy until 6 months after the last treatment. Patient can receive the flu vaccine (shot only), pneumovax and the Covid vaccine. It is optimal for the patient to get these vaccines mid cycle, but they can be given at any time as long as  it is not on the day of the infusion. No  7. Have you recently been diagnosed with any new nervous system diseases (ie. Multiple sclerosis, Guillain [place], seizures, neurological changes) or cancer diagnosis? Are you on any form of radiation or chemotherapy? No  8. Are you pregnant or breast feeding or do you have plans of pregnancy in the future? No  9. Have you been having any signs of worsening depression or suicidal ideations?  (benlysta only) No  10. Have there been any other new onset medical symptoms? No  11. Have you had any new blood clots? (IVIG only) No      Post Infusion Assessment:  Patient tolerated infusion without incident.  No evidence of extravasations.  Access discontinued per protocol.  Biologic Infusion Post Education: Call the triage nurse at your clinic or seek medical attention if you have chills and/or temperature greater than or equal to 100.5, uncontrolled nausea/vomiting, diarrhea, constipation, dizziness, shortness of breath, chest pain, heart palpitations, weakness or any other new or concerning symptoms, questions or concerns.  You cannot have any live virus vaccines prior to or during treatment or up to 6 months post infusion.  If you have an upcoming surgery, medical procedure or dental procedure during treatment, this should be discussed with your ordering physician and your surgeon/dentist.  If you are having any concerning symptom, if you are unsure if you should get your next infusion or wish to speak to a provider before your next infusion, please call your care coordinator or triage nurse at your clinic to notify them so we can adequately serve you.       Discharge Plan:   Patient discharged in stable condition accompanied by: self.  Departure Mode: Ambulatory.      Ella Hitchcock RN

## 2023-05-22 ENCOUNTER — TELEPHONE (OUTPATIENT)
Dept: GASTROENTEROLOGY | Facility: CLINIC | Age: 33
End: 2023-05-22
Payer: COMMERCIAL

## 2023-05-22 NOTE — TELEPHONE ENCOUNTER
Screening Questions  BLUE  KIND OF PREP RED  LOCATION [review exclusion criteria] GREEN  SEDATION TYPE        Y Are you active on mychart?       STEEVENS Ordering/Referring Provider?        BCBS What type of coverage do you have?      N Have you had a positive covid test in the last 14 days?     25.6 1. BMI  [BMI 40+ - review exclusion criteria& smart-phrase document]    Y  2. Are you able to give consent for your medical care? [IF NO,RN REVIEW]          N  3. Are you taking any prescription pain medications on a routine schedule   (ex narcotics: oxycodone, roxicodone, oxycontin,  and percocet)? [RN Review]          3a. EXTENDED PREP What kind of prescription?     N 4. Do you have any chemical dependencies such as alcohol, street drugs, or methadone?        **If yes 3- 5 , please schedule with MAC sedation.**          IF YES TO ANY 6 - 10 - HOSPITAL SETTING ONLY.     N 6.   Do you need assistance transferring?     N 7.   Have you had a heart or lung transplant?    N 8.   Are you currently on dialysis?   N 9.   Do you use daily home oxygen?   N 10. Do you take nitroglycerin?   10a. N If yes, how often?     N 11. Are you currently pregnant?    11a.  If yes, how many weeks? [ Greater than 12 weeks, OR NEEDED]    N 12. Do you have Pulmonary Hypertension? *NEED PAC APPT AT [facility] w/ MAC*     N 13. [review exclusion criteria]  Do you have any implantable devices in your body (pacemaker, defib, LVAD)?    N 14. In the past 6 months, have you had any heart related issues including cardiomyopathy or heart attack?     14a.  If yes, did it require cardiac stenting if so when?     N 15. Have you had a stroke or Transient ischemic attack (TIA - aka  mini stroke ) within 6 months?      N 16. Do you have mod to severe Obstructive Sleep Apnea?  [Hospital only]    N 17. Do you have SEVERE AND UNCONTROLLED asthma? *NEED PAC APPT AT [facility] w/MAC*     18.Do you take blood thinners?  No    N 19. Do you take any of the following  "medications?    NPhentermine    NOzempic    NWegovy (Semaglutide)      19a. If yes, \"Hold for 7 days before procedure.  Please consult your prescribing provider if you have questions about holding this medication.\"     N  20. Do you have chronic kidney disease?      N  21. Do you have a diagnosis of diabetes?     N  22. On a regular basis do you go 3-5 days between bowel movements?      23. Preferred LOCAL Pharmacy for Pre Prescription           [facility] - [place], [place] - [address]          - CLOSING REMINDERS -    You will receive a call from a Nurse to review instructions and health history.  This assessment must be completed prior to your procedure.  Failure to complete the Nurse assessment may result in the procedure being cancelled.      On the day of your procedure, please designatean adult(s) who can drive you home stay with you for the next 24 hours. The medicines used in the exam will make you sleepy. You will not be able to drive.      You cannot take public transportation, ride share services, or non-medical taxi service without a responsible caregiver.  Medical transport services are allowed with the requirement that a responsible caregiver will receive you at your destination.  We require that drivers and caregivers are confirmed prior to your procedure.      - SCHEDULING DETAILS -  N &  Hospital Setting Required & If yes, what is the exclusion?   SUSANNAH  Surgeon    07/27/2023  Date of Procedure  Lower Endoscopy [Colonoscopy]  Type of Procedure Scheduled  [facility]   MIRALAX GATORADE WITHOUT MAGNEISUM CITRATE Which Colonoscopy Prep was Sent?     MAC Sedation Type     N PAC / Pre-op Required               "

## 2023-06-22 DIAGNOSIS — K51.011 ULCERATIVE PANCOLITIS WITH RECTAL BLEEDING (H): Primary | ICD-10-CM

## 2023-06-22 RX ORDER — HEPARIN SODIUM (PORCINE) LOCK FLUSH IV SOLN 100 UNIT/ML 100 UNIT/ML
5 SOLUTION INTRAVENOUS
Status: CANCELLED | OUTPATIENT
Start: 2023-06-22

## 2023-06-22 RX ORDER — ALBUTEROL SULFATE 0.83 MG/ML
2.5 SOLUTION RESPIRATORY (INHALATION)
Status: CANCELLED | OUTPATIENT
Start: 2023-06-22

## 2023-06-22 RX ORDER — HEPARIN SODIUM,PORCINE 10 UNIT/ML
5-20 VIAL (ML) INTRAVENOUS DAILY PRN
Status: CANCELLED | OUTPATIENT
Start: 2023-06-22

## 2023-06-22 RX ORDER — EPINEPHRINE 1 MG/ML
0.3 INJECTION, SOLUTION INTRAMUSCULAR; SUBCUTANEOUS EVERY 5 MIN PRN
Status: CANCELLED | OUTPATIENT
Start: 2023-06-22

## 2023-06-22 RX ORDER — DIPHENHYDRAMINE HYDROCHLORIDE 50 MG/ML
50 INJECTION INTRAMUSCULAR; INTRAVENOUS
Status: CANCELLED
Start: 2023-06-22

## 2023-06-22 RX ORDER — ALBUTEROL SULFATE 90 UG/1
1-2 AEROSOL, METERED RESPIRATORY (INHALATION)
Status: CANCELLED
Start: 2023-06-22

## 2023-06-22 RX ORDER — METHYLPREDNISOLONE SODIUM SUCCINATE 125 MG/2ML
125 INJECTION, POWDER, LYOPHILIZED, FOR SOLUTION INTRAMUSCULAR; INTRAVENOUS
Status: CANCELLED
Start: 2023-06-22

## 2023-06-22 NOTE — PROGRESS NOTES
Therapy plan updated.   Monitoring labs ordered, including Quantiferon TB Gold.    Mary Kamara RN

## 2023-06-23 ENCOUNTER — TELEPHONE (OUTPATIENT)
Dept: INFUSION THERAPY | Facility: OTHER | Age: 33
End: 2023-06-23

## 2023-06-23 NOTE — PROGRESS NOTES
Received call from PAC regarding getting patient on scheduling for Entyvio on July 6th. Current treatment plan active through provider in a different facility. Will verify signing privileges before proceeding to schedule.

## 2023-07-10 ENCOUNTER — INFUSION THERAPY VISIT (OUTPATIENT)
Dept: INFUSION THERAPY | Facility: OTHER | Age: 33
End: 2023-07-10
Attending: INTERNAL MEDICINE
Payer: COMMERCIAL

## 2023-07-10 VITALS
WEIGHT: 203.04 LBS | TEMPERATURE: 98.3 F | RESPIRATION RATE: 16 BRPM | SYSTOLIC BLOOD PRESSURE: 130 MMHG | BODY MASS INDEX: 25.38 KG/M2 | DIASTOLIC BLOOD PRESSURE: 77 MMHG | OXYGEN SATURATION: 97 % | HEART RATE: 79 BPM

## 2023-07-10 DIAGNOSIS — K51.011 ULCERATIVE PANCOLITIS WITH RECTAL BLEEDING (H): Primary | ICD-10-CM

## 2023-07-10 LAB
ALBUMIN SERPL BCG-MCNC: 4 G/DL (ref 3.5–5.2)
ALP SERPL-CCNC: 126 U/L (ref 40–129)
ALT SERPL W P-5'-P-CCNC: 52 U/L (ref 0–70)
AST SERPL W P-5'-P-CCNC: 43 U/L (ref 0–45)
BASOPHILS # BLD AUTO: 0 10E3/UL (ref 0–0.2)
BASOPHILS NFR BLD AUTO: 1 %
BILIRUB DIRECT SERPL-MCNC: <0.2 MG/DL (ref 0–0.3)
BILIRUB SERPL-MCNC: 0.7 MG/DL
CRP SERPL-MCNC: <3 MG/L
EOSINOPHIL # BLD AUTO: 0.3 10E3/UL (ref 0–0.7)
EOSINOPHIL NFR BLD AUTO: 6 %
ERYTHROCYTE [DISTWIDTH] IN BLOOD BY AUTOMATED COUNT: 11.9 % (ref 10–15)
ERYTHROCYTE [SEDIMENTATION RATE] IN BLOOD BY WESTERGREN METHOD: 5 MM/HR (ref 0–15)
HCT VFR BLD AUTO: 42.5 % (ref 40–53)
HGB BLD-MCNC: 14.4 G/DL (ref 13.3–17.7)
IMM GRANULOCYTES # BLD: 0 10E3/UL
IMM GRANULOCYTES NFR BLD: 0 %
LYMPHOCYTES # BLD AUTO: 1.3 10E3/UL (ref 0.8–5.3)
LYMPHOCYTES NFR BLD AUTO: 24 %
MCH RBC QN AUTO: 30.3 PG (ref 26.5–33)
MCHC RBC AUTO-ENTMCNC: 33.9 G/DL (ref 31.5–36.5)
MCV RBC AUTO: 90 FL (ref 78–100)
MONOCYTES # BLD AUTO: 0.6 10E3/UL (ref 0–1.3)
MONOCYTES NFR BLD AUTO: 11 %
NEUTROPHILS # BLD AUTO: 3 10E3/UL (ref 1.6–8.3)
NEUTROPHILS NFR BLD AUTO: 58 %
NRBC # BLD AUTO: 0 10E3/UL
NRBC BLD AUTO-RTO: 0 /100
PLATELET # BLD AUTO: 212 10E3/UL (ref 150–450)
PROT SERPL-MCNC: 6.6 G/DL (ref 6.4–8.3)
RBC # BLD AUTO: 4.75 10E6/UL (ref 4.4–5.9)
WBC # BLD AUTO: 5.2 10E3/UL (ref 4–11)

## 2023-07-10 PROCEDURE — 85652 RBC SED RATE AUTOMATED: CPT | Performed by: INTERNAL MEDICINE

## 2023-07-10 PROCEDURE — 85025 COMPLETE CBC W/AUTO DIFF WBC: CPT | Performed by: INTERNAL MEDICINE

## 2023-07-10 PROCEDURE — 80076 HEPATIC FUNCTION PANEL: CPT | Performed by: INTERNAL MEDICINE

## 2023-07-10 PROCEDURE — 86481 TB AG RESPONSE T-CELL SUSP: CPT | Performed by: INTERNAL MEDICINE

## 2023-07-10 PROCEDURE — 86140 C-REACTIVE PROTEIN: CPT | Performed by: INTERNAL MEDICINE

## 2023-07-10 PROCEDURE — 96365 THER/PROPH/DIAG IV INF INIT: CPT | Performed by: INTERNAL MEDICINE

## 2023-07-10 PROCEDURE — 36415 COLL VENOUS BLD VENIPUNCTURE: CPT | Performed by: INTERNAL MEDICINE

## 2023-07-10 RX ORDER — ALBUTEROL SULFATE 90 UG/1
1-2 AEROSOL, METERED RESPIRATORY (INHALATION)
Status: CANCELLED
Start: 2023-08-21

## 2023-07-10 RX ORDER — HEPARIN SODIUM (PORCINE) LOCK FLUSH IV SOLN 100 UNIT/ML 100 UNIT/ML
5 SOLUTION INTRAVENOUS
Status: CANCELLED | OUTPATIENT
Start: 2023-08-21

## 2023-07-10 RX ORDER — EPINEPHRINE 1 MG/ML
0.3 INJECTION, SOLUTION, CONCENTRATE INTRAVENOUS EVERY 5 MIN PRN
Status: CANCELLED | OUTPATIENT
Start: 2023-08-21

## 2023-07-10 RX ORDER — METHYLPREDNISOLONE SODIUM SUCCINATE 125 MG/2ML
125 INJECTION, POWDER, LYOPHILIZED, FOR SOLUTION INTRAMUSCULAR; INTRAVENOUS
Status: CANCELLED
Start: 2023-08-21

## 2023-07-10 RX ORDER — DIPHENHYDRAMINE HYDROCHLORIDE 50 MG/ML
50 INJECTION INTRAMUSCULAR; INTRAVENOUS
Status: CANCELLED
Start: 2023-08-21

## 2023-07-10 RX ORDER — HEPARIN SODIUM,PORCINE 10 UNIT/ML
5-20 VIAL (ML) INTRAVENOUS DAILY PRN
Status: CANCELLED | OUTPATIENT
Start: 2023-08-21

## 2023-07-10 RX ORDER — ALBUTEROL SULFATE 0.83 MG/ML
2.5 SOLUTION RESPIRATORY (INHALATION)
Status: CANCELLED | OUTPATIENT
Start: 2023-08-21

## 2023-07-10 RX ADMIN — Medication 250 ML: at 15:34

## 2023-07-10 NOTE — PROGRESS NOTES
Post Infusion Assessment:  Patient tolerated infusion without incident.  Site patent and intact, free from redness, edema or discomfort.  No evidence of extravasations.  Access discontinued per protocol.  Biologic Infusion Post Education: Call the triage nurse at your clinic or seek medical attention if you have chills and/or temperature greater than or equal to 100.5, uncontrolled nausea/vomiting, diarrhea, constipation, dizziness, shortness of breath, chest pain, heart palpitations, weakness or any other new or concerning symptoms, questions or concerns.  You cannot have any live virus vaccines prior to or during treatment or up to 6 months post infusion.  If you have an upcoming surgery, medical procedure or dental procedure during treatment, this should be discussed with your ordering physician and your surgeon/dentist.  If you are having any concerning symptom, if you are unsure if you should get your next infusion or wish to speak to a provider before your next infusion, please call your care coordinator or triage nurse at your clinic to notify them so we can adequately serve you.     Discharge Plan:   Discharge instructions reviewed with: Patient.  Patient and/or family verbalized understanding of discharge instructions and all questions answered.  Patient discharged in stable condition accompanied by: self.  Departure Mode: Ambulatory.

## 2023-07-10 NOTE — Clinical Note
Please call to schedule patient for next entyvio infusion/labs. Level 2. Every 8 weeks. Please DO NOT schedule any later than 1400.  Thank you Esdras

## 2023-07-10 NOTE — PROGRESS NOTES
Infusion Nursing Note:  Onel White presents today for infusion of entyvio.    Patient seen by provider today: No   present during visit today: Not Applicable.    Note: patient denies questions or concerns regarding today's infusion of entyvio.    Intravenous Access:Peripheral IV placed.    Treatment Conditions:  Biological Infusion Checklist:  ~~~ NOTE: If the patient answers yes to any of the questions below, hold the infusion and contact ordering provider or on-call provider.    1. Have you recently had an elevated temperature, fever, chills, productive cough, coughing for 3 weeks or longer or hemoptysis,  abnormal vital signs, night sweats,  chest pain or have you noticed a decrease in your appetite, unexplained weight loss or fatigue? No  2. Do you have any open wounds or new incisions? No  3. Do you have any upcoming hospitalizations or surgeries? Does not include esophagogastroduodenoscopy, colonoscopy, endoscopic retrograde cholangiopancreatography (ERCP), endoscopic ultrasound (EUS), dental procedures or joint aspiration/steroid injections No  4. Do you currently have any signs of illness or infection or are you on any antibiotics? No  5. Have you had any new, sudden or worsening abdominal pain? No  6. Have you or anyone in your household received a live vaccination in the past 4 weeks? Please note: No live vaccines while on biologic/chemotherapy until 6 months after the last treatment. Patient can receive the flu vaccine (shot only), pneumovax and the Covid vaccine. It is optimal for the patient to get these vaccines mid cycle, but they can be given at any time as long as it is not on the day of the infusion. No  7. Have you recently been diagnosed with any new nervous system diseases (ie. Multiple sclerosis, Guillain [place], seizures, neurological changes) or cancer diagnosis? Are you on any form of radiation or chemotherapy? No  8. Are you pregnant or breast feeding or do you have plans of  pregnancy in the future? No  9. Have you been having any signs of worsening depression or suicidal ideations?  (benlysta only) N/A  10. Have there been any other new onset medical symptoms? No  11. Have you had any new blood clots? (IVIG only) N/A    Component      Latest Ref Rng 7/10/2023  2:38 PM   WBC      4.0 - 11.0 10e3/uL 5.2    RBC Count      4.40 - 5.90 10e6/uL 4.75    Hemoglobin      13.3 - 17.7 g/dL 14.4    Hematocrit      40.0 - 53.0 % 42.5    MCV      78 - 100 fL 90    MCH      26.5 - 33.0 pg 30.3    MCHC      31.5 - 36.5 g/dL 33.9    RDW      10.0 - 15.0 % 11.9    Platelet Count      150 - 450 10e3/uL 212    % Neutrophils      % 58    % Lymphocytes      % 24    % Monocytes      % 11    % Eosinophils      % 6    % Basophils      % 1    % Immature Granulocytes      % 0    NRBCs per 100 WBC      <1 /100 0    Absolute Neutrophils      1.6 - 8.3 10e3/uL 3.0    Absolute Lymphocytes      0.8 - 5.3 10e3/uL 1.3    Absolute Monocytes      0.0 - 1.3 10e3/uL 0.6    Absolute Eosinophils      0.0 - 0.7 10e3/uL 0.3    Absolute Basophils      0.0 - 0.2 10e3/uL 0.0    Absolute Immature Granulocytes      <=0.4 10e3/uL 0.0    Absolute NRBCs      10e3/uL 0.0    Protein Total      6.4 - 8.3 g/dL 6.6    Albumin      3.5 - 5.2 g/dL 4.0    Bilirubin Total      <=1.2 mg/dL 0.7    Alkaline Phosphatase      40 - 129 U/L 126    AST      0 - 45 U/L 43    ALT      0 - 70 U/L 52    Bilirubin Direct      0.00 - 0.30 mg/dL <0.20    CRP Inflammation      <5.00 mg/L <3.00    Sed Rate      0 - 15 mm/hr 5

## 2023-07-11 LAB
QUANTIFERON MITOGEN: 10 IU/ML
QUANTIFERON NIL TUBE: 0.23 IU/ML
QUANTIFERON TB1 TUBE: 0.27 IU/ML
QUANTIFERON TB2 TUBE: 0.25

## 2023-07-12 LAB
GAMMA INTERFERON BACKGROUND BLD IA-ACNC: 0.23 IU/ML
M TB IFN-G BLD-IMP: NEGATIVE
M TB IFN-G CD4+ BCKGRND COR BLD-ACNC: 9.77 IU/ML
MITOGEN IGNF BCKGRD COR BLD-ACNC: 0.02 IU/ML
MITOGEN IGNF BCKGRD COR BLD-ACNC: 0.04 IU/ML

## 2023-07-18 ENCOUNTER — TELEPHONE (OUTPATIENT)
Dept: GASTROENTEROLOGY | Facility: CLINIC | Age: 33
End: 2023-07-18
Payer: COMMERCIAL

## 2023-07-18 NOTE — TELEPHONE ENCOUNTER
Caller: Onel White    Reason for Reschedule/Cancellation (please be detailed, any staff messages or encounters to note?): Susannah HARMON, case moved into [facility] for call back, [employer] message sent      Prior to reschedule please review:    Ordering Provider: Liang Hancock MD     Sedation per order: MAC    Does patient have any ASC Exclusions, please identify?: NO      Notes on Cancelled Procedure:    Procedure: Lower Endoscopy [Colonoscopy]     Date: 7/27    Location: [facility]; [address], [place] [address]    Surgeon: SUSANNAH      Rescheduled: No       Send In - basket message to Panc - Hal Pool if EUS  procedure is canceled or rescheduled: [ N/A, YES or NO] N/A

## 2023-07-19 NOTE — TELEPHONE ENCOUNTER
2nd attempt to reschedule, patient was at work and requested for call back tomorrow to reschedule.

## 2023-07-25 NOTE — TELEPHONE ENCOUNTER
Unable to do 09/20 [facility]. Asking for Fridays so he can drive down Thursday night. Patient off work at 05:30 PM or can be reach during lunch 11:30 -12:30 PM.      Offering 09/15 Arrival 1PM.

## 2023-07-27 NOTE — TELEPHONE ENCOUNTER
Rescheduled: Yes  Procedure: Lower Endoscopy [Colonoscopy]   Date: 09/15  Location: [facility]; [address], [place] [address]  Surgeon: Karissa  Sedation Level Scheduled  MAC,  Reason for Sedation Level Order  Prep/Instructions updated and sent: MyChart   Prep - Miralax

## 2023-08-31 ENCOUNTER — TELEPHONE (OUTPATIENT)
Dept: GASTROENTEROLOGY | Facility: CLINIC | Age: 33
End: 2023-08-31
Payer: COMMERCIAL

## 2023-08-31 NOTE — TELEPHONE ENCOUNTER
Pre visit planning completed.      Procedure details:    Patient scheduled for Colonoscopy  on 09.15.2023.     Arrival time: 1300. Procedure time 1345    Pre op exam needed? N/A    Facility location: [facility]; [address], [place] [address]    Sedation type: MAC    Indication for procedure: Ulcerative pancolitis with rectal bleeding (H)       Chart review:     Electronic implanted devices? No    Diabetic? No    Diabetic medication HOLDING recommendations: (if applicable)  Oral diabetic medications: N/A  Diabetic injectables: N/A  Insulin: N/A      Medication review:    Anticoagulants? No    NSAIDS? No NSAID medications per patient's medication list.  RN will verify with pre-assessment call.    Other medication HOLDING recommendations:  N/A      Prep for procedure:     Bowel prep recommendation: Miralax prep without magnesium citrate   Due to:  standard bowel prep.    Prep instructions sent via yaz Rod RN  Endoscopy Procedure Pre Assessment RN  [phone] option 4

## 2023-09-01 ENCOUNTER — INFUSION THERAPY VISIT (OUTPATIENT)
Dept: INFUSION THERAPY | Facility: OTHER | Age: 33
End: 2023-09-01
Attending: PEDIATRICS
Payer: COMMERCIAL

## 2023-09-01 VITALS
DIASTOLIC BLOOD PRESSURE: 85 MMHG | RESPIRATION RATE: 16 BRPM | SYSTOLIC BLOOD PRESSURE: 137 MMHG | OXYGEN SATURATION: 96 % | WEIGHT: 202.16 LBS | TEMPERATURE: 98.3 F | HEART RATE: 68 BPM | BODY MASS INDEX: 25.27 KG/M2

## 2023-09-01 DIAGNOSIS — K51.011 ULCERATIVE PANCOLITIS WITH RECTAL BLEEDING (H): Primary | ICD-10-CM

## 2023-09-01 PROCEDURE — 96365 THER/PROPH/DIAG IV INF INIT: CPT | Performed by: INTERNAL MEDICINE

## 2023-09-01 RX ORDER — HEPARIN SODIUM,PORCINE 10 UNIT/ML
5-20 VIAL (ML) INTRAVENOUS DAILY PRN
Status: CANCELLED | OUTPATIENT
Start: 2023-10-16

## 2023-09-01 RX ORDER — HEPARIN SODIUM (PORCINE) LOCK FLUSH IV SOLN 100 UNIT/ML 100 UNIT/ML
5 SOLUTION INTRAVENOUS
Status: CANCELLED | OUTPATIENT
Start: 2023-10-16

## 2023-09-01 RX ORDER — DIPHENHYDRAMINE HYDROCHLORIDE 50 MG/ML
50 INJECTION INTRAMUSCULAR; INTRAVENOUS
Status: CANCELLED
Start: 2023-10-16

## 2023-09-01 RX ORDER — ALBUTEROL SULFATE 90 UG/1
1-2 AEROSOL, METERED RESPIRATORY (INHALATION)
Status: CANCELLED
Start: 2023-10-16

## 2023-09-01 RX ORDER — METHYLPREDNISOLONE SODIUM SUCCINATE 125 MG/2ML
125 INJECTION, POWDER, LYOPHILIZED, FOR SOLUTION INTRAMUSCULAR; INTRAVENOUS
Status: CANCELLED
Start: 2023-10-16

## 2023-09-01 RX ORDER — EPINEPHRINE 1 MG/ML
0.3 INJECTION, SOLUTION, CONCENTRATE INTRAVENOUS EVERY 5 MIN PRN
Status: CANCELLED | OUTPATIENT
Start: 2023-10-16

## 2023-09-01 RX ORDER — ALBUTEROL SULFATE 0.83 MG/ML
2.5 SOLUTION RESPIRATORY (INHALATION)
Status: CANCELLED | OUTPATIENT
Start: 2023-10-16

## 2023-09-01 RX ADMIN — Medication 250 ML: at 09:50

## 2023-09-01 NOTE — PATIENT INSTRUCTIONS

## 2023-09-01 NOTE — TELEPHONE ENCOUNTER
Pre assessment completed for upcoming procedure.   (Please see previous telephone encounter notes for complete details)    Patient  returned call.       Procedure details:    Arrival time and facility location reviewed.    Pre op exam needed? N/A    Designated  policy reviewed. Instructed to have someone stay 24 hours post procedure.     COVID policy reviewed.      Medication review:    Medications reviewed. Please see supporting documentation below. Holding recommendations discussed (if applicable).       Prep for procedure:     Procedure prep instructions reviewed.        Additional information needed?  N/A      Patient  verbalized understanding and had no questions or concerns at this time.      Amarilis Dunn RN  Endoscopy Procedure Pre Assessment RN  [phone] option 4

## 2023-09-01 NOTE — PROGRESS NOTES
Infusion Nursing Note:  Onel White presents today for infusion or Entyvio.    Patient seen by provider today: No   present during visit today: Not Applicable.    Intravenous Access:  Peripheral IV placed.    Treatment Conditions:  Biological Infusion Checklist:  ~~~ NOTE: If the patient answers yes to any of the questions below, hold the infusion and contact ordering provider or on-call provider.    Have you recently had an elevated temperature, fever, chills, productive cough, coughing for 3 weeks or longer or hemoptysis,  abnormal vital signs, night sweats,  chest pain or have you noticed a decrease in your appetite, unexplained weight loss or fatigue? No  Do you have any open wounds or new incisions? No  Do you have any upcoming hospitalizations or surgeries? Does not include esophagogastroduodenoscopy, colonoscopy, endoscopic retrograde cholangiopancreatography (ERCP), endoscopic ultrasound (EUS), dental procedures or joint aspiration/steroid injections No  Do you currently have any signs of illness or infection or are you on any antibiotics? No  Have you had any new, sudden or worsening abdominal pain? No  Have you or anyone in your household received a live vaccination in the past 4 weeks? Please note: No live vaccines while on biologic/chemotherapy until 6 months after the last treatment. Patient can receive the flu vaccine (shot only), pneumovax and the Covid vaccine. It is optimal for the patient to get these vaccines mid cycle, but they can be given at any time as long as it is not on the day of the infusion. No  Have you recently been diagnosed with any new nervous system diseases (ie. Multiple sclerosis, Guillain [place], seizures, neurological changes) or cancer diagnosis? Are you on any form of radiation or chemotherapy? No  Are you pregnant or breast feeding or do you have plans of pregnancy in the future? N/A  Have you been having any signs of worsening depression or suicidal ideations?   (benlysta only)N/A  Have there been any other new onset medical symptoms? No  Have you had any new blood clots? (IVIG only) N/A    Post Infusion Assessment:  Patient tolerated infusion without incident.  No evidence of extravasations.  Access discontinued per protocol.  Biologic Infusion Post Education: Call the triage nurse at your clinic or seek medical attention if you have chills and/or temperature greater than or equal to 100.5, uncontrolled nausea/vomiting, diarrhea, constipation, dizziness, shortness of breath, chest pain, heart palpitations, weakness or any other new or concerning symptoms, questions or concerns.  You cannot have any live virus vaccines prior to or during treatment or up to 6 months post infusion.  If you have an upcoming surgery, medical procedure or dental procedure during treatment, this should be discussed with your ordering physician and your surgeon/dentist.  If you are having any concerning symptom, if you are unsure if you should get your next infusion or wish to speak to a provider before your next infusion, please call your care coordinator or triage nurse at your clinic to notify them so we can adequately serve you.       Discharge Plan:   Patient discharged in stable condition accompanied by: self.  Departure Mode: Ambulatory.      Esdras RAUSCH

## 2023-09-01 NOTE — Clinical Note
Please call patient to schedule his next Entyvio infusion. Level 2. Every 8 weeks. Patient prefers Friday's early morning.  Thank you Esdras

## 2023-09-14 ENCOUNTER — ANESTHESIA EVENT (OUTPATIENT)
Dept: SURGERY | Facility: AMBULATORY SURGERY CENTER | Age: 33
End: 2023-09-14
Payer: COMMERCIAL

## 2023-09-15 ENCOUNTER — HOSPITAL ENCOUNTER (OUTPATIENT)
Facility: AMBULATORY SURGERY CENTER | Age: 33
Discharge: HOME | End: 2023-09-15
Attending: INTERNAL MEDICINE
Payer: COMMERCIAL

## 2023-09-15 ENCOUNTER — ANESTHESIA (OUTPATIENT)
Dept: SURGERY | Facility: AMBULATORY SURGERY CENTER | Age: 33
End: 2023-09-15
Payer: COMMERCIAL

## 2023-09-15 VITALS
RESPIRATION RATE: 16 BRPM | TEMPERATURE: 97.1 F | SYSTOLIC BLOOD PRESSURE: 146 MMHG | OXYGEN SATURATION: 100 % | DIASTOLIC BLOOD PRESSURE: 90 MMHG | HEART RATE: 59 BPM

## 2023-09-15 VITALS — HEART RATE: 77 BPM

## 2023-09-15 LAB — COLONOSCOPY: NORMAL

## 2023-09-15 PROCEDURE — 45385 COLONOSCOPY W/LESION REMOVAL: CPT | Mod: XS

## 2023-09-15 PROCEDURE — 45390 COLONOSCOPY W/RESECTION: CPT | Mod: XS

## 2023-09-15 PROCEDURE — 45380 COLONOSCOPY AND BIOPSY: CPT | Mod: XS

## 2023-09-15 PROCEDURE — G8907 PT DOC NO EVENTS ON DISCHARG: HCPCS

## 2023-09-15 PROCEDURE — G8918 PT W/O PREOP ORDER IV AB PRO: HCPCS

## 2023-09-15 PROCEDURE — 88305 TISSUE EXAM BY PATHOLOGIST: CPT | Performed by: PATHOLOGY

## 2023-09-15 PROCEDURE — 45399 UNLISTED PROCEDURE COLON: CPT

## 2023-09-15 DEVICE — IMPLANTABLE DEVICE: Type: IMPLANTABLE DEVICE | Status: FUNCTIONAL

## 2023-09-15 RX ORDER — ONDANSETRON 2 MG/ML
4 INJECTION INTRAMUSCULAR; INTRAVENOUS
Status: DISCONTINUED | OUTPATIENT
Start: 2023-09-15 | End: 2023-09-16 | Stop reason: HOSPADM

## 2023-09-15 RX ORDER — NALOXONE HYDROCHLORIDE 0.4 MG/ML
0.4 INJECTION, SOLUTION INTRAMUSCULAR; INTRAVENOUS; SUBCUTANEOUS
Status: DISCONTINUED | OUTPATIENT
Start: 2023-09-15 | End: 2023-09-16 | Stop reason: HOSPADM

## 2023-09-15 RX ORDER — PROCHLORPERAZINE MALEATE 10 MG
10 TABLET ORAL EVERY 6 HOURS PRN
Status: DISCONTINUED | OUTPATIENT
Start: 2023-09-15 | End: 2023-09-16 | Stop reason: HOSPADM

## 2023-09-15 RX ORDER — NALOXONE HYDROCHLORIDE 0.4 MG/ML
0.2 INJECTION, SOLUTION INTRAMUSCULAR; INTRAVENOUS; SUBCUTANEOUS
Status: DISCONTINUED | OUTPATIENT
Start: 2023-09-15 | End: 2023-09-16 | Stop reason: HOSPADM

## 2023-09-15 RX ORDER — PROPOFOL 10 MG/ML
INJECTION, EMULSION INTRAVENOUS CONTINUOUS PRN
Status: DISCONTINUED | OUTPATIENT
Start: 2023-09-15 | End: 2023-09-15

## 2023-09-15 RX ORDER — SODIUM CHLORIDE, SODIUM LACTATE, POTASSIUM CHLORIDE, CALCIUM CHLORIDE 600; 310; 30; 20 MG/100ML; MG/100ML; MG/100ML; MG/100ML
INJECTION, SOLUTION INTRAVENOUS CONTINUOUS
Status: DISCONTINUED | OUTPATIENT
Start: 2023-09-15 | End: 2023-09-16 | Stop reason: HOSPADM

## 2023-09-15 RX ORDER — FLUMAZENIL 0.1 MG/ML
0.2 INJECTION, SOLUTION INTRAVENOUS
Status: DISCONTINUED | OUTPATIENT
Start: 2023-09-15 | End: 2023-09-16 | Stop reason: HOSPADM

## 2023-09-15 RX ORDER — PROPOFOL 10 MG/ML
INJECTION, EMULSION INTRAVENOUS PRN
Status: DISCONTINUED | OUTPATIENT
Start: 2023-09-15 | End: 2023-09-15

## 2023-09-15 RX ORDER — ONDANSETRON 2 MG/ML
4 INJECTION INTRAMUSCULAR; INTRAVENOUS EVERY 6 HOURS PRN
Status: DISCONTINUED | OUTPATIENT
Start: 2023-09-15 | End: 2023-09-16 | Stop reason: HOSPADM

## 2023-09-15 RX ORDER — LIDOCAINE 40 MG/G
CREAM TOPICAL
Status: DISCONTINUED | OUTPATIENT
Start: 2023-09-15 | End: 2023-09-16 | Stop reason: HOSPADM

## 2023-09-15 RX ORDER — ONDANSETRON 4 MG/1
4 TABLET, ORALLY DISINTEGRATING ORAL EVERY 6 HOURS PRN
Status: DISCONTINUED | OUTPATIENT
Start: 2023-09-15 | End: 2023-09-16 | Stop reason: HOSPADM

## 2023-09-15 RX ORDER — LIDOCAINE HYDROCHLORIDE 20 MG/ML
INJECTION, SOLUTION INFILTRATION; PERINEURAL PRN
Status: DISCONTINUED | OUTPATIENT
Start: 2023-09-15 | End: 2023-09-15

## 2023-09-15 RX ADMIN — PROPOFOL 200 MCG/KG/MIN: 10 INJECTION, EMULSION INTRAVENOUS at 14:02

## 2023-09-15 RX ADMIN — PROPOFOL 100 MG: 10 INJECTION, EMULSION INTRAVENOUS at 14:45

## 2023-09-15 RX ADMIN — PROPOFOL 50 MG: 10 INJECTION, EMULSION INTRAVENOUS at 14:07

## 2023-09-15 RX ADMIN — SODIUM CHLORIDE, SODIUM LACTATE, POTASSIUM CHLORIDE, CALCIUM CHLORIDE: 600; 310; 30; 20 INJECTION, SOLUTION INTRAVENOUS at 13:33

## 2023-09-15 RX ADMIN — PROPOFOL 70 MG: 10 INJECTION, EMULSION INTRAVENOUS at 14:02

## 2023-09-15 RX ADMIN — PROPOFOL 50 MG: 10 INJECTION, EMULSION INTRAVENOUS at 14:04

## 2023-09-15 RX ADMIN — LIDOCAINE HYDROCHLORIDE 60 MG: 20 INJECTION, SOLUTION INFILTRATION; PERINEURAL at 14:02

## 2023-09-15 RX ADMIN — PROPOFOL 50 MG: 10 INJECTION, EMULSION INTRAVENOUS at 14:03

## 2023-09-15 NOTE — ANESTHESIA CARE TRANSFER NOTE
Patient: Onel White    Procedure: Procedure(s):  Colonoscopy with CO2 insufflation  COLONOSCOPY, WITH POLYPECTOMY AND BIOPSY  COLONOSCOPY, FLEXIBLE, WITH LESION REMOVAL USING SNARE       Diagnosis: Ulcerative pancolitis with rectal bleeding (H) [K51.011]  Diagnosis Additional Information: No value filed.    Anesthesia Type:   MAC     Note:    Oropharynx: oropharynx clear of all foreign objects and spontaneously breathing  Level of Consciousness: drowsy  Oxygen Supplementation: room air    Independent Airway: airway patency satisfactory and stable  Dentition: dentition unchanged  Vital Signs Stable: post-procedure vital signs reviewed and stable  Report to RN Given: handoff report given  Patient transferred to: Phase II    Handoff Report: Identifed the Patient, Identified the Reponsible Provider, Reviewed the pertinent medical history, Discussed the surgical course, Reviewed Intra-OP anesthesia mangement and issues during anesthesia, Set expectations for post-procedure period and Allowed opportunity for questions and acknowledgement of understanding      Vitals:  Vitals Value Taken Time   BP     Temp     Pulse 77 09/15/23 1455   Resp     SpO2         Electronically Signed By: CECIL Lim CRNA  September 15, 2023  2:58 PM

## 2023-09-15 NOTE — PROGRESS NOTES
"Notified  MDA Dr. Camargo  at 1325 PM regarding  vasovagal with IV start HR down 40's after pt reports \"not feeling well\". Pt pale, diaphoretic, feeling of nausea .  Writer laid pt flat, instructed pt to take slow deep breaths. /44.     Spoke with: Dr. Camargo    Orders  no new orders .    Comments: Dr. Camargo at bedside to start IV. Pt back to baseline HR 68, /93.         "

## 2023-09-15 NOTE — H&P
ENDOSCOPY PRE-SEDATION H&P FOR OUTPATIENT PROCEDURES    Onel White  [id number]  1990    Procedure: colonoscopy    Pre-procedure diagnosis: UC    Past medical history:   Past Medical History:   Diagnosis Date    Abdominal pain, generalized     Anemia, unspecified     Diarrhea     Immunosuppression (H) 2/17/2021    Other acne     Ulcerative (chronic) enterocolitis (H) 8/3/2009    Ulcerative colitis     follow[facility] by [facility], [facility]    Ulcerative pancolitis with rectal bleeding (H) 6/26/2020    Uncomplicated asthma        Past surgical history:   Past Surgical History:   Procedure Laterality Date    COLONOSCOPY      COLONOSCOPY N/A 4/17/2019    Procedure: Combined Colonoscopy, Single Or Multiple Biopsy/Polypectomy By Biopsy;  Surgeon: Liang Hancock MD;  Location: [facility]    COLONOSCOPY N/A 6/26/2020    Procedure: Colonoscopy, With Polypectomy And Biopsy;  Surgeon: Liang Hancock MD;  Location: [facility]    COLONOSCOPY N/A 3/4/2021    Procedure: Colonoscopy, With Polypectomy And Biopsy;  Surgeon: Liang Hancock MD;  Location: [facility]    COLONOSCOPY WITH CO2 INSUFFLATION N/A 4/17/2019    Procedure: COLONOSCOPY WITH CO2 INSUFFLATION;  Surgeon: Liang Hancock MD;  Location: [facility]    COLONOSCOPY WITH CO2 INSUFFLATION N/A 6/26/2020    Procedure: COLONOSCOPY, WITH CO2 INSUFFLATION;  Surgeon: Liang Hancock MD;  Location: [facility]    COLONOSCOPY WITH CO2 INSUFFLATION N/A 3/4/2021    Procedure: COLONOSCOPY, WITH CO2 INSUFFLATION;  Surgeon: Liang Hancock MD;  Location: [facility] OR    IR LIVER BIOPSY PERCUTANEOUS  12/30/2021    ZZC APPENDECTOMY,RUPT APPENDX+ABSCESS  2006    peritonitis       Current Outpatient Medications   Medication    albuterol (PROAIR HFA/PROVENTIL HFA/VENTOLIN HFA) 108 (90 Base) MCG/ACT inhaler    albuterol (PROVENTIL) (2.5 MG/3ML) 0.083% neb solution    fluticasone-vilanterol (BREO ELLIPTA) 200-25 MCG/INH inhaler     predniSONE (DELTASONE) 20 MG tablet    UNABLE TO FIND     Current Facility-Administered Medications   Medication    lactated ringers infusion    lidocaine (LMX4) kit    lidocaine 1 % 0.1-1 mL    ondansetron (ZOFRAN) injection 4 mg    sodium chloride (PF) 0.9% PF flush 3 mL    sodium chloride (PF) 0.9% PF flush 3 mL       Allergies   Allergen Reactions    Wheat        History of Anesthesia/Sedation Problems: no    PHYSICAL EXAMINATION:  Constitutional: aaox3, cooperative, pleasant  Vitals reviewed: BP (!) 136/93 (Cuff Size: Adult Regular)   Pulse 68   Temp 97.1  F (36.2  C) (Temporal)   Resp 20   SpO2 100%   Wt:   Wt Readings from Last 2 Encounters:   09/01/23 91.7 kg (202 lb 2.6 oz)   07/10/23 92.1 kg (203 lb 0.7 oz)      Eyes: Sclera anicteric/injected  Ears/nose/mouth/throat: Normal oropharynx without ulcers or exudate, mucus membranes moist, hearing intact  Neck: supple, thyroid normal size  CV: No edema  Respiratory: Unlabored breathing  Lymph: No submandibular, supraclavicular or inguinal lymphadenopathy  Abd: Nondistended, no masses, nontender  Skin: warm, perfused, no jaundice  Psych: Normal affect  MSK: normal movement on limited exam.    ASA Score: See Provation note    Assessment/Plan:     The patient is an appropriate candidate to receive sedation.    Informed consent was discussed with the patient/family, including the risks, benefits, potential complications and any alternative options associated with sedation.    Patient assessment completed just prior to sedation and while under constant observation by the provider. Condition determined to be adequate for proceeding with sedation.    The specific risks for the procedure were discussed with the patient at the time of informed consent and include but are not limited to perforation which could require surgery, missing significant neoplasm or lesion, hemorrhage and adverse sedative complication.      Liang Hancock MD

## 2023-09-15 NOTE — ANESTHESIA PREPROCEDURE EVALUATION
Anesthesia Pre-Procedure Evaluation    Patient: Onel White   MRN: [id number] : 1990        Procedure : Procedure(s):  Colonoscopy with CO2 insufflation          Past Medical History:   Diagnosis Date    Abdominal pain, generalized     Anemia, unspecified     Diarrhea     Immunosuppression (H) 2021    Other acne     Ulcerative (chronic) enterocolitis (H) 8/3/2009    Ulcerative colitis     followed by [facility], [facility]    Ulcerative pancolitis with rectal bleeding (H) 2020    Uncomplicated asthma       Past Surgical History:   Procedure Laterality Date    COLONOSCOPY      COLONOSCOPY N/A 2019    Procedure: Combined Colonoscopy, Single Or Multiple Biopsy/Polypectomy By Biopsy;  Surgeon: Liang Hancock MD;  Location: [facility] OR    COLONOSCOPY N/A 2020    Procedure: Colonoscopy, With Polypectomy And Biopsy;  Surgeon: Liang Hancock MD;  Location: [facility]    COLONOSCOPY N/A 3/4/2021    Procedure: Colonoscopy, With Polypectomy And Biopsy;  Surgeon: Liang Hancock MD;  Location: [facility]    COLONOSCOPY WITH CO2 INSUFFLATION N/A 2019    Procedure: COLONOSCOPY WITH CO2 INSUFFLATION;  Surgeon: Liang Hancock MD;  Location: [facility]    COLONOSCOPY WITH CO2 INSUFFLATION N/A 2020    Procedure: COLONOSCOPY, WITH CO2 INSUFFLATION;  Surgeon: Liang Hancock MD;  Location: [facility]    COLONOSCOPY WITH CO2 INSUFFLATION N/A 3/4/2021    Procedure: COLONOSCOPY, WITH CO2 INSUFFLATION;  Surgeon: Liang Hancock MD;  Location: [facility]    IR LIVER BIOPSY PERCUTANEOUS  2021    ZZC APPENDECTOMY,RUPT APPENDX+ABSCESS  2006    peritonitis      Allergies   Allergen Reactions    Wheat       Social History     Tobacco Use    Smoking status: Former     Types: Cigarettes     Quit date: 2012     Years since quittin.3    Smokeless tobacco: Never   Substance Use Topics    Alcohol use: Yes     Alcohol/week: 20.0 standard  drinks of alcohol     Types: 20 Standard drinks or equivalent per week     Comment: one after work every day and weekends 5-6 each weekend day      Wt Readings from Last 1 Encounters:   09/01/23 91.7 kg (202 lb 2.6 oz)        Anesthesia Evaluation            ROS/MED HX  ENT/Pulmonary:     (+)                     asthma                  Neurologic:       Cardiovascular:       METS/Exercise Tolerance:     Hematologic:       Musculoskeletal:       GI/Hepatic:       Renal/Genitourinary:       Endo:       Psychiatric/Substance Use:       Infectious Disease:       Malignancy:       Other:            Physical Exam    Airway        Mallampati: II   TM distance: > 3 FB   Neck ROM: full     Respiratory Devices and Support         Dental           Cardiovascular          Rhythm and rate: regular     Pulmonary           breath sounds clear to auscultation           OUTSIDE LABS:  CBC:   Lab Results   Component Value Date    WBC 5.2 07/10/2023    WBC 6.8 03/15/2023    HGB 14.4 07/10/2023    HGB 15.2 03/15/2023    HCT 42.5 07/10/2023    HCT 44.8 03/15/2023     07/10/2023     03/15/2023     BMP:   Lab Results   Component Value Date     06/12/2021     08/31/2020    POTASSIUM 4.1 06/12/2021    POTASSIUM 3.7 08/31/2020    CHLORIDE 105 06/12/2021    CHLORIDE 103 08/31/2020    CO2 29 06/12/2021    CO2 28 08/31/2020    BUN 17 06/12/2021    BUN 10 08/31/2020    CR 0.89 06/12/2021    CR 0.91 08/31/2020    GLC 90 06/12/2021    GLC 70 08/31/2020     COAGS:   Lab Results   Component Value Date    PTT 30 04/13/2009    INR 0.97 12/30/2021     POC: No results found for: BGM, HCG, HCGS  HEPATIC:   Lab Results   Component Value Date    ALBUMIN 4.0 07/10/2023    PROTTOTAL 6.6 07/10/2023    ALT 52 07/10/2023    AST 43 07/10/2023    GGT 29 11/10/2010    ALKPHOS 126 07/10/2023    BILITOTAL 0.7 07/10/2023     OTHER:   Lab Results   Component Value Date    SASHA 8.8 06/12/2021    PHOS 4.6 11/09/2009    LIPASE 149 06/15/2020     TSH 2.01 01/15/2008    CRP <2.9 03/15/2023    SED 5 07/10/2023       Anesthesia Plan    ASA Status:  2       Anesthesia Type: MAC.              Consents    Anesthesia Plan(s) and associated risks, benefits, and realistic alternatives discussed. Questions answered and patient/representative(s) expressed understanding.     - Discussed: Risks, Benefits and Alternatives for BOTH SEDATION and the PROCEDURE were discussed     - Discussed with:  Patient            Postoperative Care       PONV prophylaxis: Ondansetron (or other 5HT-3), Background Propofol Infusion     Comments:                Rik Camargo MD

## 2023-09-15 NOTE — ANESTHESIA POSTPROCEDURE EVALUATION
Patient: Onel White    Procedure: Procedure(s):  Colonoscopy with CO2 insufflation  COLONOSCOPY, WITH POLYPECTOMY AND BIOPSY  COLONOSCOPY, FLEXIBLE, WITH LESION REMOVAL USING SNARE       Anesthesia Type:  MAC    Note:  Disposition: Outpatient   Postop Pain Control: Uneventful            Sign Out: Well controlled pain   PONV: No   Neuro/Psych: Uneventful            Sign Out: Acceptable/Baseline neuro status   Airway/Respiratory: Uneventful            Sign Out: Acceptable/Baseline resp. status   CV/Hemodynamics: Uneventful            Sign Out: Acceptable CV status; No obvious hypovolemia; No obvious fluid overload   Other NRE: NONE   DID A NON-ROUTINE EVENT OCCUR?            Last vitals:  Vitals Value Taken Time   BP     Temp     Pulse 77 09/15/23 1455   Resp     SpO2         Electronically Signed By: Rik Camargo MD  September 15, 2023  3:04 PM

## 2023-09-20 LAB
PATH REPORT.COMMENTS IMP SPEC: NORMAL
PATH REPORT.COMMENTS IMP SPEC: NORMAL
PATH REPORT.FINAL DX SPEC: NORMAL
PATH REPORT.GROSS SPEC: NORMAL
PATH REPORT.MICROSCOPIC SPEC OTHER STN: NORMAL
PATH REPORT.RELEVANT HX SPEC: NORMAL
PHOTO IMAGE: NORMAL

## 2023-10-27 ENCOUNTER — INFUSION THERAPY VISIT (OUTPATIENT)
Dept: INFUSION THERAPY | Facility: OTHER | Age: 33
End: 2023-10-27
Attending: INTERNAL MEDICINE
Payer: COMMERCIAL

## 2023-10-27 VITALS
DIASTOLIC BLOOD PRESSURE: 62 MMHG | SYSTOLIC BLOOD PRESSURE: 120 MMHG | WEIGHT: 209.66 LBS | BODY MASS INDEX: 26.21 KG/M2 | TEMPERATURE: 97.8 F | HEART RATE: 59 BPM

## 2023-10-27 DIAGNOSIS — K51.011 ULCERATIVE PANCOLITIS WITH RECTAL BLEEDING (H): Primary | ICD-10-CM

## 2023-10-27 LAB
ALBUMIN SERPL BCG-MCNC: 4.5 G/DL (ref 3.5–5.2)
ALP SERPL-CCNC: 110 U/L (ref 40–129)
ALT SERPL W P-5'-P-CCNC: 42 U/L (ref 0–70)
AST SERPL W P-5'-P-CCNC: 33 U/L (ref 0–45)
BASOPHILS # BLD AUTO: 0 10E3/UL (ref 0–0.2)
BASOPHILS NFR BLD AUTO: 1 %
BILIRUB DIRECT SERPL-MCNC: <0.2 MG/DL (ref 0–0.3)
BILIRUB SERPL-MCNC: 0.5 MG/DL
CRP SERPL-MCNC: <3 MG/L
EOSINOPHIL # BLD AUTO: 0.4 10E3/UL (ref 0–0.7)
EOSINOPHIL NFR BLD AUTO: 8 %
ERYTHROCYTE [DISTWIDTH] IN BLOOD BY AUTOMATED COUNT: 12.2 % (ref 10–15)
ERYTHROCYTE [SEDIMENTATION RATE] IN BLOOD BY WESTERGREN METHOD: 6 MM/HR (ref 0–15)
HCT VFR BLD AUTO: 45.6 % (ref 40–53)
HGB BLD-MCNC: 15.8 G/DL (ref 13.3–17.7)
IMM GRANULOCYTES # BLD: 0 10E3/UL
IMM GRANULOCYTES NFR BLD: 0 %
LYMPHOCYTES # BLD AUTO: 1.1 10E3/UL (ref 0.8–5.3)
LYMPHOCYTES NFR BLD AUTO: 21 %
MCH RBC QN AUTO: 30.6 PG (ref 26.5–33)
MCHC RBC AUTO-ENTMCNC: 34.6 G/DL (ref 31.5–36.5)
MCV RBC AUTO: 88 FL (ref 78–100)
MONOCYTES # BLD AUTO: 0.5 10E3/UL (ref 0–1.3)
MONOCYTES NFR BLD AUTO: 10 %
NEUTROPHILS # BLD AUTO: 3.3 10E3/UL (ref 1.6–8.3)
NEUTROPHILS NFR BLD AUTO: 60 %
NRBC # BLD AUTO: 0 10E3/UL
NRBC BLD AUTO-RTO: 0 /100
PLATELET # BLD AUTO: 194 10E3/UL (ref 150–450)
PROT SERPL-MCNC: 7.5 G/DL (ref 6.4–8.3)
RBC # BLD AUTO: 5.16 10E6/UL (ref 4.4–5.9)
WBC # BLD AUTO: 5.4 10E3/UL (ref 4–11)

## 2023-10-27 PROCEDURE — 96365 THER/PROPH/DIAG IV INF INIT: CPT | Performed by: INTERNAL MEDICINE

## 2023-10-27 PROCEDURE — 85652 RBC SED RATE AUTOMATED: CPT | Performed by: INTERNAL MEDICINE

## 2023-10-27 PROCEDURE — 36415 COLL VENOUS BLD VENIPUNCTURE: CPT | Performed by: INTERNAL MEDICINE

## 2023-10-27 PROCEDURE — 80076 HEPATIC FUNCTION PANEL: CPT | Performed by: INTERNAL MEDICINE

## 2023-10-27 PROCEDURE — 86140 C-REACTIVE PROTEIN: CPT | Performed by: INTERNAL MEDICINE

## 2023-10-27 PROCEDURE — 85025 COMPLETE CBC W/AUTO DIFF WBC: CPT | Performed by: INTERNAL MEDICINE

## 2023-10-27 RX ORDER — DIPHENHYDRAMINE HYDROCHLORIDE 50 MG/ML
50 INJECTION INTRAMUSCULAR; INTRAVENOUS
Status: CANCELLED
Start: 2023-12-11

## 2023-10-27 RX ORDER — ALBUTEROL SULFATE 0.83 MG/ML
2.5 SOLUTION RESPIRATORY (INHALATION)
Status: CANCELLED | OUTPATIENT
Start: 2023-12-11

## 2023-10-27 RX ORDER — EPINEPHRINE 1 MG/ML
0.3 INJECTION, SOLUTION, CONCENTRATE INTRAVENOUS EVERY 5 MIN PRN
Status: CANCELLED | OUTPATIENT
Start: 2023-12-11

## 2023-10-27 RX ORDER — HEPARIN SODIUM (PORCINE) LOCK FLUSH IV SOLN 100 UNIT/ML 100 UNIT/ML
5 SOLUTION INTRAVENOUS
Status: CANCELLED | OUTPATIENT
Start: 2023-12-11

## 2023-10-27 RX ORDER — ALBUTEROL SULFATE 90 UG/1
1-2 AEROSOL, METERED RESPIRATORY (INHALATION)
Status: CANCELLED
Start: 2023-12-11

## 2023-10-27 RX ORDER — METHYLPREDNISOLONE SODIUM SUCCINATE 125 MG/2ML
125 INJECTION, POWDER, LYOPHILIZED, FOR SOLUTION INTRAMUSCULAR; INTRAVENOUS
Status: CANCELLED
Start: 2023-12-11

## 2023-10-27 RX ORDER — HEPARIN SODIUM,PORCINE 10 UNIT/ML
5-20 VIAL (ML) INTRAVENOUS DAILY PRN
Status: CANCELLED | OUTPATIENT
Start: 2023-12-11

## 2023-10-27 RX ORDER — TRAZODONE HYDROCHLORIDE 50 MG/1
1 TABLET, FILM COATED ORAL AT BEDTIME
COMMUNITY
Start: 2023-09-01

## 2023-10-27 RX ADMIN — Medication 250 ML: at 08:49

## 2023-10-27 ASSESSMENT — PAIN SCALES - GENERAL: PAINLEVEL: NO PAIN (0)

## 2023-10-27 NOTE — PATIENT INSTRUCTIONS
Thank you for scheduling your appointment with us today. If you have any questions or concerns related to procedure/medication at today's visit, please contact your primary care provider, or the provider who ordered today's procedure/medication. If you have any questions related to scheduling with our unit, or if you are having trouble getting in contact with your ordering provider, we can be reached at [phone].    EDUCATION POST BIOLOGICAL/CHEMOTHERAPY INFUSION  Call the triage nurse at your clinic or seek medical attention if you have chills and/or temperature greater than or equal to 100.5, uncontrolled nausea/vomiting, diarrhea, constipation, dizziness, shortness of breath, chest pain, heart palpitations, weakness or any other new or concerning symptoms, questions or concerns.  You can not have any live virus vaccines prior to or during treatment or up to 6 months post infusion.  If you have an upcoming surgery, medical procedure or dental procedure during treatment, this should be discussed with your ordering physician and your surgeon/dentist.  If you are having any concerning symptom, if you are unsure if you should get your next infusion or wish to speak to a provider before your next infusion, please call your care coordinator or triage nurse at your clinic to notify them so we can adequately serve you.

## 2023-10-27 NOTE — PROGRESS NOTES
Infusion Nursing Note:  Onel White presents today for Entyvio.    Patient seen by provider today: No   present during visit today: Not Applicable.    Note: N/A.      Intravenous Access:  Labs drawn without difficulty.  Peripheral IV placed.    Treatment Conditions:  ~~~ NOTE: If the patient answers yes to any of the questions below, hold the infusion and contact ordering provider or on-call provider.    Do you currently have any signs of illness or infection or are you on any antibiotics? No  Have you recently had an elevated temperature, fever, chills, productive cough, coughing for 3 weeks or longer or hemoptysis, abnormal vital signs, night sweats, chest pain or have you noticed a decrease in your appetite, unexplained weight loss or fatigue? No  Have you had any new, sudden, or worsening abdominal pain? No  Do you have any open wounds or new incisions? (exclude for patients with hidradenitis suppurativa) No  Have you recently been diagnosed with any new nervous system diseases (ie. Multiple sclerosis, Guillain Chauncey, seizures, neurological changes) or cancer diagnosis? Are you on any form of radiation or chemotherapy? No  Have there been any other new onset medical symptoms? No  Are you pregnant or breast feeding or do you have plans of pregnancy in the future?  N/A  Do you have any upcoming hospitalizations or surgeries? Does not include esophagogastroduodenoscopy, colonoscopy, endoscopic retrograde cholangiopancreatography (ERCP), endoscopic ultrasound (EUS), dental procedures (including cleanings, fillings, implants, extractions)  or joint aspiration/steroid injections No  Have you or anyone in your household received a live vaccination in the past 4 weeks? Please note: No live vaccines while on biologic/chemotherapy until 6 months after the last treatment. Patient can receive the flu vaccine (shot only).  It is optimal for the patient to get it mid cycle, but it can be given at any time as long  Pediatric Well Child Exam: 2 Weeks of Age      Chief Complaint   Patient presents with   • Well Child     2 MONTH       SUBJECTIVE:  Rosio Caban is a 1 week old female who presents for a 2 week old well child exam.  Patient presents with Mother.    Preferred method of results communication:     Cell Phone:   Telephone Information:   Mobile [phone]     Okay to leave a message containing results? Yes    CONCERNS RAISED TODAY: none    MEASUREMENTS:    Visit Vitals  Temp 98.7 °F (37.1 °C) (Temporal)   Ht 20.5\" (52.1 cm)   Wt 3.359 kg (7 lb 6.5 oz)   HC 34 cm (13.39\")   BMI 12.39 kg/m²        FEEDING:     every 2-3 hours    ELIMINATION::     Normal wet diapers and bowel movements.    SLEEPING:     Max 3 hours.    MEDICATIONS:   Current Medications    No medications on file       ALLERGIES:  ALLERGIES:  No Known Allergies      DEVELOPMENTAL SCREENING:   [x]  YES     []  NO     []  UNKNOWN   While prone, lifts head  [x]  YES     []  NO     []  UNKNOWN   Regards face  [x]  YES     []  NO     []  UNKNOWN   Reponds to noise     [place]:     [x]Reviewed   Negative (4)   [] Mom encouraged to complete    PHYSICAL EXAM:  VITAL SIGNS:   Visit Vitals  Temp 98.7 °F (37.1 °C) (Temporal)   Ht 20.5\" (52.1 cm)   Wt 3.359 kg (7 lb 6.5 oz)   HC 34 cm (13.39\")   BMI 12.39 kg/m²    29 %ile (Z= -0.57) based on WHO (Girls, 0-2 years) weight-for-age data using vitals from 2021.  GENERAL: Well appearing female infant in no acute distress. Alert and consolable.  SKIN: Warm, normal turgor. No cyanosis. No rash.   HEAD: Normocephalic, atraumatic. Anterior fontanel open, soft and flat.  EYES: Conjunctivae appear normal, noninjected, nonicteric, positive red reflex.  NOSE: Appears normal.  No flaring.  EARS: Normal pinnae, no preauricular skin tags. Tympanic membranes are transparent with normal landmarks.  THROAT: Oropharynx with moist mucous membranes and no lesions.  NECK: Supple. No lymphadenopathy or masses.  HEART:  Regular rate and rhythm. Normal S1, S2.  No murmurs.   LUNGS: Clear to auscultation. No wheezes, rales, rhonchi. Normal work effort with breathing.  ABDOMEN: Soft, nontender. No organomegaly or masses.  GENITOURINARY: Jamil stage 1. Normal female genitalia.  Rectum/anus patent.  EXTREMITIES: Hips - normal range of motion. Negative Jeffers and Ortolani's.  Equal femoral pulses. Peripheral pulses 2/4.  SPINE: Spine appears straight. Normal sacrum.  NEUROLOGIC: Normal tone, bulk, strength.      Patient Education (click):  Nutrition:      [x] Breastfeeding    [x] Formula    [x] Vitamins    Medical:      [x] Sleep position    [x] Fever      [x] Smoke free environment    [x] Protection from infection       Behavior:      [x] Crying    Safety:      [x] Auto passenger restraint    [x] Smoke detector     Guidance:      [x] Sibling jealousy    [x]  Reviewed and discussed parent questions / concerns       ASSESSMENT:   1 week old female SEEN FOR:  Encounter Diagnoses   Name Primary?   • Well child check,  8-28 days old Yes         PLAN:  1. All parental concerns and questions discussed.  2. Anticipatory guidance provided, handout/s given.    Car seat use  3.    Feeding  4.    Normal  Behaviors  5.    Accident Prevention  6.    SIDS Prevention  7.    Fever Management  8. No Immunizations given.      ORDERS:  Orders Placed This Encounter   • cholecalciferol (VITAMIN D) 10 mcg (400 units)/mL oral liquid        FOLLOW UP: Return in 6 weeks (on 2021) for 2 month well child visit.    Charli Loaiza MD   [facility] [address]  [address]  [address]  [facility] [address]  Dept Phone: [phone]                      as it is not on the day of the infusion. No      Post Infusion Assessment:  Patient tolerated infusion without incident.  Site patent and intact, free from redness, edema or discomfort.  No evidence of extravasations.  Access discontinued per protocol.  EDUCATION POST BIOLOGICAL/CHEMOTHERAPY INFUSION  Call the triage nurse at your clinic or seek medical attention if you have chills and/or temperature greater than or equal to 100.5, uncontrolled nausea/vomiting, diarrhea, constipation, dizziness, shortness of breath, chest pain, heart palpitations, weakness or any other new or concerning symptoms, questions or concerns.  You can not have any live virus vaccines prior to or during treatment or up to 6 months post infusion.  If you have an upcoming surgery, medical procedure or dental procedure during treatment, this should be discussed with your ordering physician and your surgeon/dentist.  If you are having any concerning symptom, if you are unsure if you should get your next infusion or wish to speak to a provider before your next infusion, please call your care coordinator or triage nurse at your clinic to notify them so we can adequately serve you.    Patient does not wish to schedule next infusion at this time. He notes he has number to scheduling desk and will call when he is ready.    Discharge Plan:   Patient discharged in stable condition accompanied by: self.  Departure Mode: Ambulatory.

## 2023-12-22 ENCOUNTER — TELEPHONE (OUTPATIENT)
Dept: INFUSION THERAPY | Facility: OTHER | Age: 33
End: 2023-12-22

## 2023-12-22 NOTE — Clinical Note
Patient will need to be scheduled for entyvio level 2 will need labs He is due on 12/27.  Please schedule for next Friday if possible and reach out to patient.  Thank you Jocelyne REILLY RN

## 2024-01-09 ENCOUNTER — TELEPHONE (OUTPATIENT)
Dept: GASTROENTEROLOGY | Facility: CLINIC | Age: 34
End: 2024-01-09
Payer: COMMERCIAL

## 2024-01-09 NOTE — TELEPHONE ENCOUNTER
----- Message from Liang Hancock MD sent at 1/8/2024  9:13 PM CST -----  Regarding: RE: RADHA  Yeah, I think he cancelled or no showed a few appointments or so since then...    [facility] team, lets plug him in ASAP.  Looks like I could probably see him tomorrow, ideally in person but ok for virtual if he can't do in person.  Can we reach out to him first thing in the AM?  Thanks!  Liang Hancock MD        ----- Message -----  From: Amarilis Montgomery  Sent: 1/8/2024   4:34 PM CST  To: Liang Hancock MD; #  Subject: ENYTVIO                                          Hello,  Working on securing coverage for "[employer]" and [employer] came back wanting more recent clinicals. Unless I missed something the last note I see if from 11/2022.  Thank you !!   -Rafa

## 2024-01-09 NOTE — TELEPHONE ENCOUNTER
Dr. Hancock states Onel will need an in person or virtual visit can be with a SHANNEN   Resheculed visit for 1/11/24 with Abel Ivan at 1130 Am virtual.

## 2024-01-09 NOTE — TELEPHONE ENCOUNTER
Spoke with Onel who states he is working out of town, could only do a virtual visit today with Dr. aHncock at 1230, Writer sent message to Dr. Hancock.

## 2024-01-09 NOTE — TELEPHONE ENCOUNTER
Spoke with Onel who accepted 1230 visit today with Dr. Hancock, did state he thinks it will have to be a phone visit. Writer updated Dr. Hancock.     Message sent to scheduling navigator to schedule visit for today.

## 2024-01-11 ENCOUNTER — VIRTUAL VISIT (OUTPATIENT)
Dept: GASTROENTEROLOGY | Facility: CLINIC | Age: 34
End: 2024-01-11
Payer: COMMERCIAL

## 2024-01-11 DIAGNOSIS — K51.011 ULCERATIVE PANCOLITIS WITH RECTAL BLEEDING (H): Primary | Chronic | ICD-10-CM

## 2024-01-11 PROCEDURE — 99213 OFFICE O/P EST LOW 20 MIN: CPT | Mod: 93 | Performed by: PHYSICIAN ASSISTANT

## 2024-01-11 NOTE — PROGRESS NOTES
IBD FOLLOW UP VISIT       REASON FOR VISIT: FOLLOW UP     HPI: Onel White is 33 year old male who presents for IBD follow-up.  He has a longstanding history of ulcerative pancolitis since 2009.  He is currently being managed with vedolizumab every 8 weeks.  He was last seen by Dr. Hancock in November 2022.  He has historically done relatively well with his Entyvio infusions. He does feel some symptoms as he gets close to his infusions. He typically has 2-3 softer BM's daily. In the recent months he has noted occ rectal bleeding about 1-2 day a week. He unfortunately is overdue for entyvio by 3 weeks and has noticed further exacerbation in rectal bleeding, now occurring 3-4 days a week. He is having softer stools about 2-3 times a day with some associated abdominal cramping which he reports as being his norm. BM's typically may occur in the morning.  Denies urgency.  No tenesmus.  Denies having nighttime stools.  No rectal pain. He takes a tylenol or ibuprofen as needed 1-2 times a month. We discussed avoiding NSAID's altogether given concern/risk for flare up.     Last colonoscopy completed in September 2023.  Scarred mucosa noted in the entire examined colon.  Inactive Ortega score 0 ulcerative colitis.  Biopsies noted minimally active chronic colitis. 5 mm cecal polyp and 20 mm adenomatous transverse colon polyp also noted. Last labs in October 2023 were normal including cbc, cmp, esr, crp.     He does have history of elevated LFTs in the past with prominent intrahepatic biliary ducts noted on MRCP.  Subsequently evaluated with a liver biopsy which showed focal periductal fibrosis of the large ducts.  No significant ductopenia.  Biopsy is considered.  Subtle for possible early stage PSC.  LFTs have remained normal since then.  He was previously drinking alcohol heavily but has stopped.    IBD History   Age at diagnosis:  Dx 2009  Extent of disease:  Pancolitis  Current UC medications:  Vedolizumab q8wk  Prior  UC surgeries:  none  Prior IBD Medications: Sulfasalazine, steroids, Lialda        DRUG MONITORING  TPMT enzyme activity:   Phenotype normal, level 33     6-TGN/6-MMPN levels:  none     Biologic concentration:  none    PMHx, PSHx, Social Hx, Family Hx have been reviewed.     ENDOSCOPY   Colonoscopy September 2023: Scarred mucosa noted in the entire examined colon.  Inactive Ortega score 0 ulcerative colitis.  Biopsies noted minimally active chronic colitis. 5 mm cecal polyp and 20 mm adenomatous transverse colon polyp also noted.     Enterography: none    Fecal calprotectin: June 2020 was 1060    C diff: none    Liver evaluation: prominent intrahepatic biliary ducts greatest in the left hepatic lobe, no findings strongly suggestive of PSC and hepatomegaly. Liver biopsy showed focal periductal fibrosis of the large ducts.  There is not any significant ductopenia. The biopsies are subtle for early stage PSC.      Physical Exam   healthy, alert, and no distress  PSYCH: Alert and oriented times 3; coherent speech, normal   rate and volume, able to articulate logical thoughts, able   to abstract reason, no tangential thoughts, no hallucinations   or delusions, his affect is normal  RESP: No cough, no audible wheezing, able to talk in full sentences  Remainder of exam unable to be completed due to telephone visits        ASSESSMENT/PLAN:      #Ulcerative Pancolitis with rectal bleeding   Onel White is a 33 year old male who presents for follow up of his UC, which was diagnosed in 2009. He has been well managed with entyvio (vedolizumab) over the past several years. Has started to notice a slow progression of symptoms lately.  He would typically have symptoms as he approaches infusion due date.  At first it started off with rectal bleeding 1-2 times a week over the past several months however in the last month in particular he has noted rectal bleeding 3 to 4 days out of the week.  Will update monitoring labs and check  stool studies. He was noted to have mild active chronic colitis on last colonoscopy in September 2023 at Entyvio dosing every 8 weeks. He is currently 3 weeks overdue for Entyvio infusion which may explain his recent further exacerbation and symptoms.  Given his prior clinical improvement with entyvio, it would be imperative to restart his infusions. We will also plan to increase to q 6 weeks dosing given progression of disease evidenced by findings of active colitis on colonoscopy.  Reviewed decision to increase entyvio to q 6 weeks with Dr. Hancock, he agrees with plan.   --monitoring labs and stool studies   --increase frequency of entyvio to q 6 weeks dosing     #IBD health maintenance   Will refer to [facility] pharmacy,  Briefly reviewed today:  - stay up to date on vaccinations, avoid live vaccines,   - regular cancer screenings including annual skin check.   - dysplasia colonoscopy due by September 2024   - avoid NSAID's due to risk of causing flare  - vitamin D and calcium supplementation     #hx of elevated LFT's  History of elevated LFTs in the past with prominent intrahepatic biliary ducts noted on MRCP.  Subsequently evaluated with a liver biopsy which showed focal periductal fibrosis of the large ducts.  No significant ductopenia.  Biopsy is considered.  Subtle for possible early stage PSC.  LFTs have remained normal since then.  He was previously drinking alcohol heavily but has stopped.    Follow up in ~ 4 months, sooner if needed.     Thank you for this consultation.  It was a pleasure to participate in the care of this patient; please contact us with any further questions.      This note was created with voice recognition software, and while reviewed for accuracy, typos may remain.     Abel Ivan PA-C  Division of Gastroenterology, Hepatology and Nutrition   [facility]     Phone call duration: 21 minutes

## 2024-01-11 NOTE — PATIENT INSTRUCTIONS
It was a pleasure visiting with you today.  Please schedule lab appointment to complete blood work and to collect your stool gets.  You are overdue for entyvio infusions. We will work on getting you back on track.  There is actually a possibility that you may benefit from having the entyvio infusion more frequently. I will plan to review this with Dr. Hancock.     Follow up in ~ 4 months, sooner if needed.

## 2024-01-12 ENCOUNTER — DOCUMENTATION ONLY (OUTPATIENT)
Dept: INFUSION THERAPY | Facility: OTHER | Age: 34
End: 2024-01-12

## 2024-01-12 NOTE — TELEPHONE ENCOUNTER
Patient seen and evaluated today January 11, 2024   Please see documentation.   Plan to continue entyvio infusions, but will increase frequency to q6 weeks dosing.     Abel Ivan PA-C

## 2024-01-12 NOTE — TELEPHONE ENCOUNTER
Escalation request sent to infusion finance team [facility]. Will update therapy plan once PA is approved for escalation to every 6 weeks.

## 2024-01-12 NOTE — NURSING NOTE
Inbasket message from patient Entyvio ordering providers office, to infusion and benefits, updating that documentation needed by benefits should now be visible and alerting to provider noting they will be updating orders to every 6 weeks infusions.    Responded alerting that we will await benefits auth/approval before scheduling patient, and also advising that therapy plan orders continue to read Q8 weeks and will need to be updated and signed by provider prior to ongoing infusions.

## 2024-01-15 ENCOUNTER — TELEPHONE (OUTPATIENT)
Dept: GASTROENTEROLOGY | Facility: CLINIC | Age: 34
End: 2024-01-15
Payer: COMMERCIAL

## 2024-01-15 DIAGNOSIS — K51.011 ULCERATIVE PANCOLITIS WITH RECTAL BLEEDING (H): Primary | ICD-10-CM

## 2024-01-15 RX ORDER — METHYLPREDNISOLONE SODIUM SUCCINATE 125 MG/2ML
125 INJECTION, POWDER, LYOPHILIZED, FOR SOLUTION INTRAMUSCULAR; INTRAVENOUS
Status: CANCELLED
Start: 2024-01-15

## 2024-01-15 RX ORDER — HEPARIN SODIUM,PORCINE 10 UNIT/ML
5-20 VIAL (ML) INTRAVENOUS DAILY PRN
Status: CANCELLED | OUTPATIENT
Start: 2024-01-15

## 2024-01-15 RX ORDER — ALBUTEROL SULFATE 0.83 MG/ML
2.5 SOLUTION RESPIRATORY (INHALATION)
Status: CANCELLED | OUTPATIENT
Start: 2024-01-15

## 2024-01-15 RX ORDER — EPINEPHRINE 1 MG/ML
0.3 INJECTION, SOLUTION, CONCENTRATE INTRAVENOUS EVERY 5 MIN PRN
Status: CANCELLED | OUTPATIENT
Start: 2024-01-15

## 2024-01-15 RX ORDER — HEPARIN SODIUM (PORCINE) LOCK FLUSH IV SOLN 100 UNIT/ML 100 UNIT/ML
5 SOLUTION INTRAVENOUS
Status: CANCELLED | OUTPATIENT
Start: 2024-01-15

## 2024-01-15 RX ORDER — DIPHENHYDRAMINE HYDROCHLORIDE 50 MG/ML
50 INJECTION INTRAMUSCULAR; INTRAVENOUS
Status: CANCELLED
Start: 2024-01-15

## 2024-01-15 RX ORDER — ALBUTEROL SULFATE 90 UG/1
1-2 AEROSOL, METERED RESPIRATORY (INHALATION)
Status: CANCELLED
Start: 2024-01-15

## 2024-01-15 NOTE — TELEPHONE ENCOUNTER
----- Message from Yasmine Iniguez RN sent at 1/12/2024 10:26 AM CST -----  Regarding: PA  Entyvio escalation to eery 6 weeks

## 2024-01-15 NOTE — TELEPHONE ENCOUNTER
RV:  Not scheduled  LV: 24    TB:  7/10/23 Negative    Renew Therapy Plan:  Entyvio escalation from every 8 weeks to every 6 weeks  Reorder Standing Labs: CBC, CRP,Hepatic  Order TB:   NA  Discontinue  Therapy Plan: Discontinued    Last dose: 10/27/23  Next dose: not scheduled overdue    Patient therapy plan updated due to escalation in therapy. Patient continues on medication per last clinic encounter. Standing lab orders also updated in the patient chart.    Urgent PA request sent to [place] infusion finance team.

## 2024-01-26 ENCOUNTER — VIRTUAL VISIT (OUTPATIENT)
Dept: GASTROENTEROLOGY | Facility: CLINIC | Age: 34
End: 2024-01-26
Attending: PHYSICIAN ASSISTANT
Payer: COMMERCIAL

## 2024-01-26 DIAGNOSIS — J45.30 MILD PERSISTENT ASTHMA WITHOUT COMPLICATION: ICD-10-CM

## 2024-01-26 DIAGNOSIS — K51.011 ULCERATIVE PANCOLITIS WITH RECTAL BLEEDING (H): Primary | Chronic | ICD-10-CM

## 2024-01-26 NOTE — Clinical Note
1/26/2024         RE: Onel White  [address]  [place] [address]        Dear Colleague,    Thank you for referring your patient, Onel White, to the [facility]. Please see a copy of my visit note below.    Medication Therapy Management (MTM) Encounter    ASSESSMENT:                            Medication Adherence/Access: {adherencechoices:652231}    Ulcerative Colitis:  ***      PLAN:                            Reminder to schedule infusion- [phone]   Infusion finance team -   Onel will consider the following vaccines:  Annual flu shot  Updated COVID-19 vaccine  Tetanus booster (Tdap)  Pneumococcal pneumonia (Prevnar-20)- sent [employer] in [place]  Hepatitis B vaccine (3 dose series)  Consider starting a calcium supplement. I recommend calcium 600 mg/vitamin D 400 units 1 tablet by mouth once daily with food.  I've placed an order for a DEXA scan to look at your bone density. To schedule the DEXA call the imaging schedulers at [phone]. For an out-of-pocket estimate, call the Cost of Care estimate line at [phone].  Take acetaminophen (brand name: Tylenol) for mild aches and pain. Avoid NSAIDs, which include aspirin, ibuprofen (Advil, Motrin), and naproxen (Aleve, Naprosyn).     Follow-up: {followuptest2:680391}    SUBJECTIVE/OBJECTIVE:                          Onel White is a 33 year old male { :340366} for {mtmvisit:231271}     Reason for visit: ***.    Allergies/ADRs: Reviewed in chart  Past Medical History: {1/2/3/4/5:302836}  Tobacco: He reports that he quit smoking about 11 years ago. His smoking use included cigarettes. He has never used smokeless tobacco.  Alcohol: 4-6 beverages / week  {Social and Goals:657349}    Medication Adherence/Access: {fumedadherence:425688}    Ulcerative Colitis:   Vedolizumab 300 mg every 6 weeks (previously on every 8 weeks, hasn't had infusion at this frequency yet)    Patient expresses hesitation about increasing  Entyvio to every 6 weeks due to cost. Will refer to infusion finance. Denies side effects.     GI symptoms:  Bleeding: not as much  Bowel movements: usual, 2-3 times in morning, first half is formed/soft, second half is loose but not diarrhea  Abdominal pain: a little bit of cramping in mornings      Last infusion: 10/27/23  Next infusion (Q8 week dosing): 23 (overdue)  Last provider visit: 24  Next provider visit: RTC 4 months  Last labs completed: 10/27/23  Lab frequency: every other infusion   - standing labs yes hepatic, crp, cbc with platelets & diff  1/15/2025  Next labs due: now   Last IBD Health Maintenance Review: never      IBD Health Care Maintenance:    Vaccinations:  All patients on biologics should avoid live vaccines unless specifically indicated.    -- Influenza (every year)- not on file  -- TdaP (every 10 years)- last   -- Pneumococcal Pneumonia    Prevnar-13: not on file   Pneumovax-23: not on file   Prevnar-20: not on file  -- COVID-19- not on file  -- Yearly assessment for latent Tb (verbal screening and exam, PPD or QuantiFERON-Tb testing)      result: negative 7/10/2023    One time confirmation of immunity or serologies:  -- Hepatitis A (serologies or immunizations)- nonreactive 2020  -- Hepatitis B (serologies or immunizations)- serology indicates not immune   -- Varicella/Zoster    Varicella - not on file   Zoster- not on file  -- MMR- not on file  -- HPV (all aged 18-26)- not on file  -- Meningococcal meningitis (all patients at risk for meningitis)-- not on file    Due to the immunosuppression in this patient, I would not advise administration of live vaccines such as varicella/VZV, intranasal influenza, MMR, or yellow fever vaccine (if traveling).      Pre-Biologic Screening:  -- Hep B Surface Antibody non-reactive 2020  -- Hep B Surface Antigen non-reactive 2020  -- Hep B Core Antibody non-reactive 2020  -- Hep C Antibody non-reactive  7/26/2020    Bone mineral density screening   -- Recommend all patients supplement with calcium and vitamin D  -- No previous DEXA on file; >3 months cumulative prednisone exposure due to Asthma exacerbations    Cancer Screening:  Colon cancer screening:  - dysplasia colonoscopy due by September 2024     Skin cancer screening: Annual visual exam of skin by dermatologist since patient is immunocompromised    Depression Screening:    PHQ-2 Score:         11/1/2022     3:17 PM 2/17/2021     4:15 PM   PHQ-2 ( 1999 [employer])   Q1: Little interest or pleasure in doing things 0 1   Q2: Feeling down, depressed or hopeless 0 1   PHQ-2 Score 0 2   PHQ-2 Total Score (12-17 Years)- Positive if 3 or more points; Administer PHQ-A if positive  2   Q1: Little interest or pleasure in doing things  Several days   Q2: Feeling down, depressed or hopeless  Several days   PHQ-2 Score  2        Research:  Are you interested in being contacted about enrollment in clinical research studies? Yes    Would you like to receive a quarterly newsletter on research via email. YES  [email or web address]    Misc:  -- Avoid tobacco use  -- Avoid NSAIDs as there is potentially a 25% chance of causing an IBD flare    Asthma:   ICS/LABA - Breo Ellipta 200/25mcg - one puff once daily  Albuterol (ProAir/Ventolin/Proventil)- hasn't used in weeks, maybe a few days a month [place]   Prednisone as needed- hasn't used in past 5 months   Patient rinses their mouth after using steroid inhaler.   Patient reports no current medication side effects.      Triggers include: dust mites, humidity, occupational exposure, and cold air.  Patient reports the following symptoms: none.            Today's Vitals: There were no vitals taken for this visit.  ----------------  {GRACIELA?:907951}    I spent {mtm total time 3:355243} with this patient today. { :142461}. A copy of the visit note was provided to the patient's provider(s).    A summary of these  recommendations {GIVEN/NOT GIVEN:696589}.    ***    Telemedicine Visit Details  Type of service:  Telephone visit  Start Time:  9:00 AM  End Time:  9:38 AM     Medication Therapy Recommendations  No medication therapy recommendations to display       Medication Therapy Management (MTM) Encounter    ASSESSMENT:                            Medication Adherence/Access: No issues identified    Ulcerative Colitis:  Onel would benefit from continued treatment with Entyvio 300 mg every 6 weeks (has not had first infusion at this escalated frequency). They are up to date on routine maintenance labs. They are up to date on annual tuberculosis screening. No access issues for their advanced therapy are present, however Onel's information was sent to infusion finance team to help enroll in available patient assistance programs. They are indicated for a few vaccinations which were recommended to them. They are not getting adequate calcium in their diet and supplementation was recommended. They are indicated for a DEXA scan due to cumulative prednisone exposure primarily from asthma exacerbations, and it has been ordered with this encounter. Reminders for routine cancer screening were provided. Education provided on avoidance of NSAIDs.     Asthma: Stable.      PLAN:                            I have passed along your information to the infusion finance team. They will reach out to you to help with enrollment in any available financial assistance programs.  Onel will consider the following vaccines:  Annual flu shot  Updated COVID-19 vaccine  Tetanus booster (Tdap)  Pneumococcal pneumonia (Prevnar-20)  Hepatitis B vaccine (3 dose series)  Consider starting a calcium supplement. I recommend calcium 600 mg/vitamin D 400 units 1 tablet by mouth once daily with food.  I've placed an order for a DEXA scan to look at your bone density. To schedule the DEXA call the imaging schedulers at [phone]. For an out-of-pocket estimate, call  the Cost of Care estimate line at [phone].  Take acetaminophen (brand name: Tylenol) for mild aches and pain. Avoid NSAIDs, which include aspirin, ibuprofen (Advil, Motrin), and naproxen (Aleve, Naprosyn).     Follow-up: MTM Visit in 6 months    SUBJECTIVE/OBJECTIVE:                          Onel White is a 33 year old male called for an initial visit. He was referred to me from Abel Ivan PA-C.      Reason for visit: Entyvio + IBD health maintenance.    Allergies/ADRs: Reviewed in chart  Past Medical History: Reviewed in chart  Tobacco: He reports that he quit smoking about 11 years ago. His smoking use included cigarettes. He has never used smokeless tobacco.  Alcohol: 4-6 beverages / week      Medication Adherence/Access: no issues reported    Ulcerative Colitis:   Vedolizumab 300 mg every 6 weeks (previously on every 8 weeks, hasn't had infusion at this frequency yet).    Patient expresses hesitation about increasing Entyvio to every 6 weeks due to cost. Will refer to infusion finance. Denies side effects. He is currently about 5 weeks overdue for Entyvio infusion.     GI symptoms:  Bleeding: not as much  Bowel movements: usual, 2-3 times in morning, first half is formed/soft, second half is loose but not diarrhea  Abdominal pain: a little bit of cramping in mornings      Last infusion: 10/27/23  Next infusion (Q8 week dosing): 23 (overdue)  Last provider visit: 24  Next provider visit: RTC 4 months  Last labs completed: 10/27/23  Lab frequency: every other infusion   - standing labs yes hepatic, crp, cbc with platelets & diff  1/15/2025  Next labs due: now   Last IBD Health Maintenance Review: never      IBD Health Care Maintenance:    Vaccinations:  All patients on biologics should avoid live vaccines unless specifically indicated.    -- Influenza (every year)- not on file  -- TdaP (every 10 years)- last   -- Pneumococcal Pneumonia    Prevnar-13: not on file   Pneumovax-23: not on  file   Prevnar-20: not on file  -- COVID-19- not on file  -- Yearly assessment for latent Tb (verbal screening and exam, PPD or QuantiFERON-Tb testing)      result: negative 7/10/2023    One time confirmation of immunity or serologies:  -- Hepatitis A (serologies or immunizations)- nonreactive 6/23/2020  -- Hepatitis B (serologies or immunizations)- serology indicates not immune 2020  -- Varicella/Zoster    Varicella - not on file   Zoster- not on file  -- MMR- not on file  -- HPV (all aged 18-26)- not on file  -- Meningococcal meningitis (all patients at risk for meningitis)-- not on file    Due to the immunosuppression in this patient, I would not advise administration of live vaccines such as varicella/VZV, intranasal influenza, MMR, or yellow fever vaccine (if traveling).      Pre-Biologic Screening:  -- Hep B Surface Antibody non-reactive 6/22/2020  -- Hep B Surface Antigen non-reactive 6/22/2020  -- Hep B Core Antibody non-reactive 6/22/2020  -- Hep C Antibody non-reactive 7/26/2020    Bone mineral density screening   -- Recommend all patients supplement with calcium and vitamin D  -- No previous DEXA on file; >3 months cumulative prednisone exposure mostly due to Asthma exacerbations    Cancer Screening:  Colon cancer screening:  - dysplasia colonoscopy due by September 2024     Skin cancer screening: Annual visual exam of skin by dermatologist since patient is immunocompromised    Depression Screening:    PHQ-2 Score:         11/1/2022     3:17 PM 2/17/2021     4:15 PM   PHQ-2 ( 1999 Pfizer)   Q1: Little interest or pleasure in doing things 0 1   Q2: Feeling down, depressed or hopeless 0 1   PHQ-2 Score 0 2   PHQ-2 Total Score (12-17 Years)- Positive if 3 or more points; Administer PHQ-A if positive  2   Q1: Little interest or pleasure in doing things  Several days   Q2: Feeling down, depressed or hopeless  Several days   PHQ-2 Score  2        Research:  Are you interested in being contacted about enrollment  in clinical research studies? Yes    Would you like to receive a quarterly newsletter on research via email. YES  [email or web address]    Misc:  -- Avoid tobacco use  -- Avoid NSAIDs as there is potentially a 25% chance of causing an IBD flare    Asthma:   ICS/LABA - Breo Ellipta 200/25mcg - one puff once daily  Albuterol (ProAir/Ventolin/Proventil)- hasn't used in weeks, maybe a few days a month [place]   Prednisone as needed- hasn't used in past 5 months   Patient rinses their mouth after using steroid inhaler.   Patient reports no current medication side effects.      Triggers include: dust mites, humidity, occupational exposure, and cold air.  Patient reports the following symptoms: none.      ----------------      I spent 38 minutes with this patient today. All changes were made via collaborative practice agreement with Abel Ivan PA-C. A copy of the visit note was provided to the patient's provider(s).    A summary of these recommendations was sent via [employer].    Tari SantanaD, BCPS  MTM Pharmacist   [facility]  Phone: [phone]    Telemedicine Visit Details  Type of service:  Telephone visit  Start Time:  9:00 AM  End Time:  9:38 AM     Medication Therapy Recommendations  No medication therapy recommendations to display         Again, thank you for allowing me to participate in the care of your patient.        Sincerely,        Harsha Chen RPH

## 2024-01-26 NOTE — PROGRESS NOTES
Medication Therapy Management (MTM) Encounter    ASSESSMENT:                            Medication Adherence/Access: No issues identified    Ulcerative Colitis:  Onel would benefit from continued treatment with Entyvio 300 mg every 6 weeks (has not had first infusion at this escalated frequency). They are up to date on routine maintenance labs. They are up to date on annual tuberculosis screening. No access issues for their advanced therapy are present, however Onel's information was sent to infusion finance team to help enroll in available patient assistance programs. They are indicated for a few vaccinations which were recommended to them. They are not getting adequate calcium in their diet and supplementation was recommended. They are indicated for a DEXA scan due to cumulative prednisone exposure primarily from asthma exacerbations, and it has been ordered with this encounter. Reminders for routine cancer screening were provided. Education provided on avoidance of NSAIDs.     Asthma: Stable.      PLAN:                            I have passed along your information to the infusion finance team. They will reach out to you to help with enrollment in any available financial assistance programs.  Onel will consider the following vaccines:  Annual flu shot  Updated COVID-19 vaccine  Tetanus booster (Tdap)  Pneumococcal pneumonia (Prevnar-20)  Hepatitis B vaccine (3 dose series)  Consider starting a calcium supplement. I recommend calcium 600 mg/vitamin D 400 units 1 tablet by mouth once daily with food.  I've placed an order for a DEXA scan to look at your bone density. To schedule the DEXA call the imaging schedulers at [phone]. For an out-of-pocket estimate, call the Cost of Care estimate line at [phone].  Take acetaminophen (brand name: Tylenol) for mild aches and pain. Avoid NSAIDs, which include aspirin, ibuprofen (Advil, Motrin), and naproxen (Aleve, Naprosyn).     Follow-up: MTM Visit in 6  months    SUBJECTIVE/OBJECTIVE:                          Onel White is a 33 year old male called for an initial visit. He was referred to me from Abel Ivan PA-C.      Reason for visit: Entyvio + IBD health maintenance.    Allergies/ADRs: Reviewed in chart  Past Medical History: Reviewed in chart  Tobacco: He reports that he quit smoking about 11 years ago. His smoking use included cigarettes. He has never used smokeless tobacco.  Alcohol: 4-6 beverages / week      Medication Adherence/Access: no issues reported    Ulcerative Colitis:   Vedolizumab 300 mg every 6 weeks (previously on every 8 weeks, hasn't had infusion at this frequency yet).    Patient expresses hesitation about increasing Entyvio to every 6 weeks due to cost. Will refer to infusion finance. Denies side effects. He is currently about 5 weeks overdue for Entyvio infusion.     GI symptoms:  Bleeding: not as much  Bowel movements: usual, 2-3 times in morning, first half is formed/soft, second half is loose but not diarrhea  Abdominal pain: a little bit of cramping in mornings      Last infusion: 10/27/23  Next infusion (Q8 week dosing): 23 (overdue)  Last provider visit: 24  Next provider visit: RTC 4 months  Last labs completed: 10/27/23  Lab frequency: every other infusion   - standing labs yes hepatic, crp, cbc with platelets & diff  1/15/2025  Next labs due: now   Last IBD Health Maintenance Review: never      IBD Health Care Maintenance:    Vaccinations:  All patients on biologics should avoid live vaccines unless specifically indicated.    -- Influenza (every year)- not on file  -- TdaP (every 10 years)- last   -- Pneumococcal Pneumonia    Prevnar-13: not on file   Pneumovax-23: not on file   Prevnar-20: not on file  -- COVID-19- not on file  -- Yearly assessment for latent Tb (verbal screening and exam, PPD or QuantiFERON-Tb testing)      result: negative 7/10/2023    One time confirmation of immunity or serologies:  --  Hepatitis A (serologies or immunizations)- nonreactive 6/23/2020  -- Hepatitis B (serologies or immunizations)- serology indicates not immune 2020  -- Varicella/Zoster    Varicella - not on file   Zoster- not on file  -- MMR- not on file  -- HPV (all aged 18-26)- not on file  -- Meningococcal meningitis (all patients at risk for meningitis)-- not on file    Due to the immunosuppression in this patient, I would not advise administration of live vaccines such as varicella/VZV, intranasal influenza, MMR, or yellow fever vaccine (if traveling).      Pre-Biologic Screening:  -- Hep B Surface Antibody non-reactive 6/22/2020  -- Hep B Surface Antigen non-reactive 6/22/2020  -- Hep B Core Antibody non-reactive 6/22/2020  -- Hep C Antibody non-reactive 7/26/2020    Bone mineral density screening   -- Recommend all patients supplement with calcium and vitamin D  -- No previous DEXA on file; >3 months cumulative prednisone exposure mostly due to Asthma exacerbations    Cancer Screening:  Colon cancer screening:  - dysplasia colonoscopy due by September 2024     Skin cancer screening: Annual visual exam of skin by dermatologist since patient is immunocompromised    Depression Screening:    PHQ-2 Score:         11/1/2022     3:17 PM 2/17/2021     4:15 PM   PHQ-2 ( 1999 Pfizer)   Q1: Little interest or pleasure in doing things 0 1   Q2: Feeling down, depressed or hopeless 0 1   PHQ-2 Score 0 2   PHQ-2 Total Score (12-17 Years)- Positive if 3 or more points; Administer PHQ-A if positive  2   Q1: Little interest or pleasure in doing things  Several days   Q2: Feeling down, depressed or hopeless  Several days   PHQ-2 Score  2        Research:  Are you interested in being contacted about enrollment in clinical research studies? Yes    Would you like to receive a quarterly newsletter on research via email. YES  [email or web address]    Misc:  -- Avoid tobacco use  -- Avoid NSAIDs as there is potentially a 25% chance of causing an IBD  flare    Asthma:   ICS/LABA - Breo Ellipta 200/25mcg - one puff once daily  Albuterol (ProAir/Ventolin/Proventil)- hasn't used in weeks, maybe a few days a month [place]   Prednisone as needed- hasn't used in past 5 months   Patient rinses their mouth after using steroid inhaler.   Patient reports no current medication side effects.      Triggers include: dust mites, humidity, occupational exposure, and cold air.  Patient reports the following symptoms: none.      ----------------      I spent 38 minutes with this patient today. All changes were made via collaborative practice agreement with Abel Ivan PA-C. A copy of the visit note was provided to the patient's provider(s).    A summary of these recommendations was sent via [employer].    Harsha Chen, PharmD, BCPS  MT Pharmacist   [facility]  Phone: [phone]    Telemedicine Visit Details  Type of service:  Telephone visit  Start Time:  9:00 AM  End Time:  9:38 AM     Medication Therapy Recommendations  No medication therapy recommendations to display

## 2024-01-31 ENCOUNTER — TELEPHONE (OUTPATIENT)
Dept: GASTROENTEROLOGY | Facility: CLINIC | Age: 34
End: 2024-01-31
Payer: COMMERCIAL

## 2024-01-31 NOTE — TELEPHONE ENCOUNTER
Spoke with Onel, updated him the PA has been approved from Entyvio infusions every 6 weeks.   Onel voices understanding and states will schedule his infusion appointment.

## 2024-01-31 NOTE — TELEPHONE ENCOUNTER
----- Message from Amarilis Montgomery sent at 1/31/2024  7:53 AM CST -----  Regarding: RE: Plan updated  This has been approved. Thanks!   ----- Message -----  From: Amarilis Montgomery  Sent: 1/25/2024  11:14 AM CST  To: Yasmine Iniguez RN; #  Subject: RE: Plan updated                                 Sorry for the late response, this has been submitted and is pending.   Thanks -Rafa   ----- Message -----  From: Yasmine Iniguez RN  Sent: 1/22/2024   4:32 PM CST  To: Amarilis Montgomery; #  Subject: RE: Plan updated                                 Hello, I am following up on the PA for Onel's infusion. He is overdue and I don't see any activity on the referral.  Thanks,   Sarahy GARZA  ----- Message -----  From: Amarilis Montgomery  Sent: 1/16/2024   3:31 PM CST  To: Yasmine Iniguez RN; #  Subject: RE: Plan updated                                 Good Afternoon,  This is currently pending, thank you!!   -Rafa   ----- Message -----  From: Yasmine Iniguez RN  Sent: 1/15/2024   3:49 PM CST  To: Infusion Finance - [place] / [place] / [place]  Subject: Plan updated                                     Hello, please submit for urgent PA and for Entyvio 300 mg every 6 weeks.   Therapy plan updated.    Please do not hesitate to contact us with any questions or concerns.     ThanksSarahy RN care coordinator

## 2024-02-02 ENCOUNTER — TELEPHONE (OUTPATIENT)
Dept: ONCOLOGY | Facility: OTHER | Age: 34
End: 2024-02-02

## 2024-02-02 ENCOUNTER — INFUSION THERAPY VISIT (OUTPATIENT)
Dept: INFUSION THERAPY | Facility: CLINIC | Age: 34
End: 2024-02-02
Attending: NURSE PRACTITIONER
Payer: COMMERCIAL

## 2024-02-02 VITALS
WEIGHT: 195.4 LBS | RESPIRATION RATE: 18 BRPM | DIASTOLIC BLOOD PRESSURE: 73 MMHG | SYSTOLIC BLOOD PRESSURE: 118 MMHG | OXYGEN SATURATION: 98 % | TEMPERATURE: 98.2 F | BODY MASS INDEX: 24.42 KG/M2 | HEART RATE: 70 BPM

## 2024-02-02 DIAGNOSIS — K51.011 ULCERATIVE PANCOLITIS WITH RECTAL BLEEDING (H): Primary | ICD-10-CM

## 2024-02-02 DIAGNOSIS — K51.011 ULCERATIVE PANCOLITIS WITH RECTAL BLEEDING (H): ICD-10-CM

## 2024-02-02 LAB
ALBUMIN SERPL BCG-MCNC: 4.7 G/DL (ref 3.5–5.2)
ALP SERPL-CCNC: 94 U/L (ref 40–150)
ALT SERPL W P-5'-P-CCNC: 43 U/L (ref 0–70)
ANION GAP SERPL CALCULATED.3IONS-SCNC: 8 MMOL/L (ref 7–15)
AST SERPL W P-5'-P-CCNC: 42 U/L (ref 0–45)
BASOPHILS # BLD AUTO: 0 10E3/UL (ref 0–0.2)
BASOPHILS NFR BLD AUTO: 1 %
BILIRUB SERPL-MCNC: 0.9 MG/DL
BUN SERPL-MCNC: 16.3 MG/DL (ref 6–20)
CALCIUM SERPL-MCNC: 9.6 MG/DL (ref 8.6–10)
CHLORIDE SERPL-SCNC: 103 MMOL/L (ref 98–107)
CREAT SERPL-MCNC: 0.96 MG/DL (ref 0.67–1.17)
CRP SERPL-MCNC: <3 MG/L
DEPRECATED HCO3 PLAS-SCNC: 28 MMOL/L (ref 22–29)
EGFRCR SERPLBLD CKD-EPI 2021: >90 ML/MIN/1.73M2
EOSINOPHIL # BLD AUTO: 0.3 10E3/UL (ref 0–0.7)
EOSINOPHIL NFR BLD AUTO: 6 %
ERYTHROCYTE [DISTWIDTH] IN BLOOD BY AUTOMATED COUNT: 11.9 % (ref 10–15)
ERYTHROCYTE [SEDIMENTATION RATE] IN BLOOD BY WESTERGREN METHOD: 1 MM/HR (ref 0–15)
GLUCOSE SERPL-MCNC: 121 MG/DL (ref 70–99)
HCT VFR BLD AUTO: 45.8 % (ref 40–53)
HGB BLD-MCNC: 15.5 G/DL (ref 13.3–17.7)
IMM GRANULOCYTES # BLD: 0 10E3/UL
IMM GRANULOCYTES NFR BLD: 0 %
LYMPHOCYTES # BLD AUTO: 0.9 10E3/UL (ref 0.8–5.3)
LYMPHOCYTES NFR BLD AUTO: 21 %
MCH RBC QN AUTO: 30.3 PG (ref 26.5–33)
MCHC RBC AUTO-ENTMCNC: 33.8 G/DL (ref 31.5–36.5)
MCV RBC AUTO: 90 FL (ref 78–100)
MONOCYTES # BLD AUTO: 0.5 10E3/UL (ref 0–1.3)
MONOCYTES NFR BLD AUTO: 11 %
NEUTROPHILS # BLD AUTO: 2.7 10E3/UL (ref 1.6–8.3)
NEUTROPHILS NFR BLD AUTO: 61 %
NRBC # BLD AUTO: 0 10E3/UL
NRBC BLD AUTO-RTO: 0 /100
PLATELET # BLD AUTO: 194 10E3/UL (ref 150–450)
POTASSIUM SERPL-SCNC: 4.3 MMOL/L (ref 3.4–5.3)
PROT SERPL-MCNC: 7.8 G/DL (ref 6.4–8.3)
RBC # BLD AUTO: 5.11 10E6/UL (ref 4.4–5.9)
SODIUM SERPL-SCNC: 139 MMOL/L (ref 135–145)
TSH SERPL DL<=0.005 MIU/L-ACNC: 1.59 UIU/ML (ref 0.3–4.2)
WBC # BLD AUTO: 4.4 10E3/UL (ref 4–11)

## 2024-02-02 PROCEDURE — 99207 PR NO CHARGE LOS: CPT

## 2024-02-02 PROCEDURE — 250N000011 HC RX IP 250 OP 636: Mod: JZ | Performed by: PHYSICIAN ASSISTANT

## 2024-02-02 PROCEDURE — 85652 RBC SED RATE AUTOMATED: CPT

## 2024-02-02 PROCEDURE — 84443 ASSAY THYROID STIM HORMONE: CPT

## 2024-02-02 PROCEDURE — 36415 COLL VENOUS BLD VENIPUNCTURE: CPT

## 2024-02-02 PROCEDURE — 82040 ASSAY OF SERUM ALBUMIN: CPT

## 2024-02-02 PROCEDURE — 96365 THER/PROPH/DIAG IV INF INIT: CPT

## 2024-02-02 PROCEDURE — 86140 C-REACTIVE PROTEIN: CPT

## 2024-02-02 PROCEDURE — 85025 COMPLETE CBC W/AUTO DIFF WBC: CPT

## 2024-02-02 PROCEDURE — 258N000003 HC RX IP 258 OP 636: Performed by: PHYSICIAN ASSISTANT

## 2024-02-02 RX ORDER — DIPHENHYDRAMINE HYDROCHLORIDE 50 MG/ML
50 INJECTION INTRAMUSCULAR; INTRAVENOUS
Status: CANCELLED
Start: 2024-03-15

## 2024-02-02 RX ORDER — HEPARIN SODIUM (PORCINE) LOCK FLUSH IV SOLN 100 UNIT/ML 100 UNIT/ML
5 SOLUTION INTRAVENOUS
Status: CANCELLED | OUTPATIENT
Start: 2024-03-15

## 2024-02-02 RX ORDER — HEPARIN SODIUM,PORCINE 10 UNIT/ML
5-20 VIAL (ML) INTRAVENOUS DAILY PRN
Status: CANCELLED | OUTPATIENT
Start: 2024-03-15

## 2024-02-02 RX ORDER — ALBUTEROL SULFATE 0.83 MG/ML
2.5 SOLUTION RESPIRATORY (INHALATION)
Status: CANCELLED | OUTPATIENT
Start: 2024-03-15

## 2024-02-02 RX ORDER — ALBUTEROL SULFATE 90 UG/1
1-2 AEROSOL, METERED RESPIRATORY (INHALATION)
Status: CANCELLED
Start: 2024-03-15

## 2024-02-02 RX ORDER — METHYLPREDNISOLONE SODIUM SUCCINATE 125 MG/2ML
125 INJECTION, POWDER, LYOPHILIZED, FOR SOLUTION INTRAMUSCULAR; INTRAVENOUS
Status: CANCELLED
Start: 2024-03-15

## 2024-02-02 RX ORDER — EPINEPHRINE 1 MG/ML
0.3 INJECTION, SOLUTION INTRAMUSCULAR; SUBCUTANEOUS EVERY 5 MIN PRN
Status: CANCELLED | OUTPATIENT
Start: 2024-03-15

## 2024-02-02 RX ADMIN — SODIUM CHLORIDE 250 ML: 9 INJECTION, SOLUTION INTRAVENOUS at 15:29

## 2024-02-02 RX ADMIN — VEDOLIZUMAB 300 MG: 300 INJECTION, POWDER, LYOPHILIZED, FOR SOLUTION INTRAVENOUS at 15:32

## 2024-02-02 NOTE — CONFIDENTIAL NOTE
Patient is now authorized for his infusion can we please call him to get him on the schedule for entyvio Level 3 he will need labs

## 2024-02-02 NOTE — PROGRESS NOTES
Infusion Nursing Note:  Onel White presents today for Entyvio.    Patient seen by provider today: No   present during visit today: Not Applicable.    Note: Patient expressed some blood in the stool which MD is aware of. Patient is also 5 weeks late for his infusion due to insurance issues. Patient mentioned that he has noticed that he is more sore when waking up over the last few months but when he gets up and starts moving he is back to baseline. Also stated that he has had some numbness to hands (right more than left) but it subsides as he moves hands. Advised if these symptoms continue to let MD know. Patient verbalized understanding and agreement with plan.      Intravenous Access:  Peripheral IV placed.    Treatment Conditions:  Biological Infusion Checklist:  ~~~ NOTE: If the patient answers yes to any of the questions below, hold the infusion and contact ordering provider or on-call provider.    Have you recently had an elevated temperature, fever, chills, productive cough, coughing for 3 weeks or longer or hemoptysis,  abnormal vital signs, night sweats,  chest pain or have you noticed a decrease in your appetite, unexplained weight loss or fatigue? No  Do you have any open wounds or new incisions? No  Do you have any upcoming hospitalizations or surgeries? Does not include esophagogastroduodenoscopy, colonoscopy, endoscopic retrograde cholangiopancreatography (ERCP), endoscopic ultrasound (EUS), dental procedures or joint aspiration/steroid injections No  Do you currently have any signs of illness or infection or are you on any antibiotics? No  Have you had any new, sudden or worsening abdominal pain? No  Have you or anyone in your household received a live vaccination in the past 4 weeks? Please note: No live vaccines while on biologic/chemotherapy until 6 months after the last treatment. Patient can receive the flu vaccine (shot only), pneumovax and the Covid vaccine. It is optimal for the  patient to get these vaccines mid cycle, but they can be given at any time as long as it is not on the day of the infusion. No  Have you recently been diagnosed with any new nervous system diseases (ie. Multiple sclerosis, Guillain [place], seizures, neurological changes) or cancer diagnosis? Are you on any form of radiation or chemotherapy? No  Have there been any other new onset medical symptoms? No        Post Infusion Assessment:  Patient tolerated infusion without incident.  Blood return noted pre and post infusion.  Site patent and intact, free from redness, edema or discomfort.  No evidence of extravasations.  Access discontinued per protocol.       Discharge Plan:   [facility]  Patient will return 3/15/24 for next appointment.   Patient discharged in stable condition accompanied by: self.  Departure Mode: Ambulatory.      Abby Bonds RN

## 2024-02-04 RX ORDER — VEDOLIZUMAB 300 MG/5ML
300 INJECTION, POWDER, LYOPHILIZED, FOR SOLUTION INTRAVENOUS
COMMUNITY

## 2024-02-04 RX ORDER — PNEUMOCOCCAL 20-VALENT CONJUGATE VACCINE 2.2; 2.2; 2.2; 2.2; 2.2; 2.2; 2.2; 2.2; 2.2; 2.2; 2.2; 2.2; 2.2; 2.2; 2.2; 2.2; 4.4; 2.2; 2.2; 2.2 UG/.5ML; UG/.5ML; UG/.5ML; UG/.5ML; UG/.5ML; UG/.5ML; UG/.5ML; UG/.5ML; UG/.5ML; UG/.5ML; UG/.5ML; UG/.5ML; UG/.5ML; UG/.5ML; UG/.5ML; UG/.5ML; UG/.5ML; UG/.5ML; UG/.5ML; UG/.5ML
0.5 INJECTION, SUSPENSION INTRAMUSCULAR ONCE
Qty: 0.5 ML | Refills: 0 | Status: SHIPPED | OUTPATIENT
Start: 2024-02-04 | End: 2024-02-04

## 2024-02-05 NOTE — PATIENT INSTRUCTIONS
"Recommendations from today's MTM visit:                                                      I have passed along your information to the infusion finance team. They will reach out to you to help with enrollment in any available financial assistance programs.  Onel will consider the following vaccines:  Annual flu shot  Updated COVID-19 vaccine  Tetanus booster (Tdap)  Pneumococcal pneumonia (Prevnar-20)  Hepatitis B vaccine (3 dose series)  Consider starting a calcium supplement. I recommend calcium 600 mg/vitamin D 400 units 1 tablet by mouth once daily with food.  I've placed an order for a DEXA scan to look at your bone density. To schedule the DEXA call the imaging schedulers at [phone]. For an out-of-pocket estimate, call the Cost of Care estimate line at [phone].  Take acetaminophen (brand name: Tylenol) for mild aches and pain. Avoid NSAIDs, which include aspirin, ibuprofen (Advil, Motrin), and naproxen (Aleve, Naprosyn).     Follow-up: MTM Visit in 6 months    It was great speaking with you today.  I value your experience and would be very thankful for your time in providing feedback in our clinic survey. In the next few days, you may receive an email or text message from [facility] [employer] with a link to a survey related to your  clinical pharmacist.\"     To schedule another MTM appointment, please call the clinic directly or you may call the MTM scheduling line at [phone] or toll-free at [phone].     My Clinical Pharmacist's contact information:                                                      Please feel free to contact me with any questions or concerns you have.      Harsha Chen, PharmD, BCPS  MTM Pharmacist   [facility]  Phone: [phone]   "

## 2024-02-20 NOTE — TELEPHONE ENCOUNTER
"Hospital/TCU/ED for chronic condition Discharge Protocol    \"Hi, my name is Natalia Poole, a registered nurse, and I am calling from [facility].  I am calling to follow up and see how things are going for you after your recent emergency visit/hospital/TCU stay.\"    Tell me how you are doing now that you are home?\" Doing a lot better. A lot more manageable. Slightly congested but doing better.\"      Discharge Instructions    \"Let's review your discharge instructions.  What is/are the follow-up recommendations?  Pt. Response: Follow up with PCP    \"Has an appointment with your primary care provider been scheduled?\"   Yes. (confirm)    \"When you see the provider, I would recommend that you bring your medications with you.\"    Medications    \"Tell me what changed about your medicines when you discharged?\"    Changes to chronic meds?    0-1    \"What questions do you have about your medications?\"    None     New diagnoses of heart failure, COPD, diabetes, or MI?    No              Medication reconciliation completed? Yes  Was MTM referral placed (*Make sure to put transitions as reason for referral)?   No    Call Summary    \"What questions or concerns do you have about your recent visit and your follow-up care?\"     none    \"If you have questions or things don't continue to improve, we encourage you contact us through the main clinic number (give number).  Even if the clinic is not open, triage nurses are available 24/7 to help you.     We would like you to know that our clinic has extended hours (provide information).  We also have urgent care (provide details on closest location and hours/contact info)\"      \"Thank you for your time and take care!\"    Natalia Poole RN  [facility]  " No

## 2024-03-16 ENCOUNTER — HEALTH MAINTENANCE LETTER (OUTPATIENT)
Age: 34
End: 2024-03-16

## 2024-03-20 ENCOUNTER — LAB (OUTPATIENT)
Dept: LAB | Facility: HOSPITAL | Age: 34
End: 2024-03-20
Payer: COMMERCIAL

## 2024-03-20 DIAGNOSIS — K51.011 ULCERATIVE PANCOLITIS WITH RECTAL BLEEDING (H): ICD-10-CM

## 2024-03-20 LAB
ALBUMIN SERPL BCG-MCNC: 4.3 G/DL (ref 3.5–5.2)
ALP SERPL-CCNC: 97 U/L (ref 40–150)
ALT SERPL W P-5'-P-CCNC: 38 U/L (ref 0–70)
AST SERPL W P-5'-P-CCNC: 40 U/L (ref 0–45)
BASOPHILS # BLD AUTO: 0 10E3/UL (ref 0–0.2)
BASOPHILS NFR BLD AUTO: 1 %
BILIRUB DIRECT SERPL-MCNC: <0.2 MG/DL (ref 0–0.3)
BILIRUB SERPL-MCNC: 0.6 MG/DL
CRP SERPL-MCNC: <3 MG/L
EOSINOPHIL # BLD AUTO: 0.6 10E3/UL (ref 0–0.7)
EOSINOPHIL NFR BLD AUTO: 9 %
ERYTHROCYTE [DISTWIDTH] IN BLOOD BY AUTOMATED COUNT: 12 % (ref 10–15)
HCT VFR BLD AUTO: 42.4 % (ref 40–53)
HGB BLD-MCNC: 14.5 G/DL (ref 13.3–17.7)
IMM GRANULOCYTES # BLD: 0 10E3/UL
IMM GRANULOCYTES NFR BLD: 0 %
LYMPHOCYTES # BLD AUTO: 1.7 10E3/UL (ref 0.8–5.3)
LYMPHOCYTES NFR BLD AUTO: 25 %
MCH RBC QN AUTO: 30.3 PG (ref 26.5–33)
MCHC RBC AUTO-ENTMCNC: 34.2 G/DL (ref 31.5–36.5)
MCV RBC AUTO: 89 FL (ref 78–100)
MONOCYTES # BLD AUTO: 0.7 10E3/UL (ref 0–1.3)
MONOCYTES NFR BLD AUTO: 10 %
NEUTROPHILS # BLD AUTO: 3.7 10E3/UL (ref 1.6–8.3)
NEUTROPHILS NFR BLD AUTO: 55 %
NRBC # BLD AUTO: 0 10E3/UL
NRBC BLD AUTO-RTO: 0 /100
PLATELET # BLD AUTO: 208 10E3/UL (ref 150–450)
PROT SERPL-MCNC: 7.5 G/DL (ref 6.4–8.3)
RBC # BLD AUTO: 4.78 10E6/UL (ref 4.4–5.9)
WBC # BLD AUTO: 6.6 10E3/UL (ref 4–11)

## 2024-03-20 PROCEDURE — 85004 AUTOMATED DIFF WBC COUNT: CPT

## 2024-03-20 PROCEDURE — 82040 ASSAY OF SERUM ALBUMIN: CPT

## 2024-03-20 PROCEDURE — 36415 COLL VENOUS BLD VENIPUNCTURE: CPT

## 2024-03-20 PROCEDURE — 86140 C-REACTIVE PROTEIN: CPT

## 2024-03-22 ENCOUNTER — INFUSION THERAPY VISIT (OUTPATIENT)
Dept: INFUSION THERAPY | Facility: OTHER | Age: 34
End: 2024-03-22
Attending: INTERNAL MEDICINE
Payer: COMMERCIAL

## 2024-03-22 VITALS
WEIGHT: 207.23 LBS | BODY MASS INDEX: 25.9 KG/M2 | SYSTOLIC BLOOD PRESSURE: 131 MMHG | RESPIRATION RATE: 17 BRPM | OXYGEN SATURATION: 99 % | HEART RATE: 70 BPM | TEMPERATURE: 96.9 F | DIASTOLIC BLOOD PRESSURE: 72 MMHG

## 2024-03-22 DIAGNOSIS — K51.011 ULCERATIVE PANCOLITIS WITH RECTAL BLEEDING (H): Primary | ICD-10-CM

## 2024-03-22 PROCEDURE — 96365 THER/PROPH/DIAG IV INF INIT: CPT | Performed by: INTERNAL MEDICINE

## 2024-03-22 RX ORDER — HEPARIN SODIUM (PORCINE) LOCK FLUSH IV SOLN 100 UNIT/ML 100 UNIT/ML
5 SOLUTION INTRAVENOUS
Status: CANCELLED | OUTPATIENT
Start: 2024-04-26

## 2024-03-22 RX ORDER — ALBUTEROL SULFATE 90 UG/1
1-2 AEROSOL, METERED RESPIRATORY (INHALATION)
Status: CANCELLED
Start: 2024-04-26

## 2024-03-22 RX ORDER — DIPHENHYDRAMINE HYDROCHLORIDE 50 MG/ML
50 INJECTION INTRAMUSCULAR; INTRAVENOUS
Status: CANCELLED
Start: 2024-04-26

## 2024-03-22 RX ORDER — EPINEPHRINE 1 MG/ML
0.3 INJECTION, SOLUTION, CONCENTRATE INTRAVENOUS EVERY 5 MIN PRN
Status: CANCELLED | OUTPATIENT
Start: 2024-04-26

## 2024-03-22 RX ORDER — METHYLPREDNISOLONE SODIUM SUCCINATE 125 MG/2ML
125 INJECTION, POWDER, LYOPHILIZED, FOR SOLUTION INTRAMUSCULAR; INTRAVENOUS
Status: CANCELLED
Start: 2024-04-26

## 2024-03-22 RX ORDER — ALBUTEROL SULFATE 0.83 MG/ML
2.5 SOLUTION RESPIRATORY (INHALATION)
Status: CANCELLED | OUTPATIENT
Start: 2024-04-26

## 2024-03-22 RX ORDER — HEPARIN SODIUM,PORCINE 10 UNIT/ML
5-20 VIAL (ML) INTRAVENOUS DAILY PRN
Status: CANCELLED | OUTPATIENT
Start: 2024-04-26

## 2024-03-22 RX ADMIN — Medication 250 ML: at 14:53

## 2024-03-22 NOTE — PROGRESS NOTES
Infusion Nursing Note:  Onel White presents today for Entyvio.    Patient seen by provider today: No   present during visit today: Not Applicable.    Note: Patient had labs completed prior to infusion today.      Intravenous Access:  Peripheral IV placed.    Treatment Conditions:  Lab Results   Component Value Date    HGB 14.5 03/20/2024    WBC 6.6 03/20/2024    ANEU 4.3 06/12/2021    ANEUTAUTO 3.7 03/20/2024     03/20/2024        Lab Results   Component Value Date     02/02/2024    POTASSIUM 4.3 02/02/2024    CR 0.96 02/02/2024    SASHA 9.6 02/02/2024    BILITOTAL 0.6 03/20/2024    ALBUMIN 4.3 03/20/2024    ALT 38 03/20/2024    AST 40 03/20/2024       Results reviewed, labs MET treatment parameters, ok to proceed with treatment.      Post Infusion Assessment:  Patient tolerated infusion without incident.    Blood return noted pre and post infusion.  Site patent and intact, free from redness, edema or discomfort.  No evidence of extravasations.  Access discontinued per protocol.       Discharge Plan:   Patient and/or family verbalized understanding of discharge instructions and all questions answered.  AVS to patient via [facility].  Patient will return 6 weeks for next appointment. Patient will call to schedule closer to the date due as he never is sure where he will be in relation to work.  He occasionally receives in [facility].   Patient discharged in stable condition accompanied by: self.  Departure Mode: Ambulatory.      Jocelyne Hinds RN

## 2024-03-22 NOTE — PROGRESS NOTES
~~~ NOTE: If the patient answers yes to any of the questions below, hold the infusion and contact ordering provider or on-call provider.    Do you currently have any signs of illness or infection or are you on any antibiotics? No  Have you recently had an elevated temperature, fever, chills, productive cough, coughing for 3 weeks or longer or hemoptysis, abnormal vital signs, night sweats, chest pain or have you noticed a decrease in your appetite, unexplained weight loss or fatigue? No  Have you had any new, sudden, or worsening abdominal pain? No  Do you have any open wounds or new incisions? (exclude for patients with hidradenitis suppurativa) No  Have you recently been diagnosed with any new nervous system diseases (ie. Multiple sclerosis, Guillain [place], seizures, neurological changes) or cancer diagnosis? Are you on any form of radiation or chemotherapy? No  Have there been any other new onset medical symptoms? No  Are you pregnant or breast feeding or do you have plans of pregnancy in the future? No; N/A  Do you have any upcoming hospitalizations or surgeries? Does not include esophagogastroduodenoscopy, colonoscopy, endoscopic retrograde cholangiopancreatography (ERCP), endoscopic ultrasound (EUS), dental procedures (including cleanings, fillings, implants, extractions)  or joint aspiration/steroid injections No  Have you or anyone in your household received a live vaccination in the past 4 weeks? Please note: No live vaccines while on biologic/chemotherapy until 6 months after the last treatment. Patient can receive the flu vaccine (shot only).  It is optimal for the patient to get it mid cycle, but it can be given at any time as long as it is not on the day of the infusion. No  If applicable to prescribed medication, confirm negative PPD or quantiferon gold MTB. If positive, verify has negative chest x-ray or the patient is at least 4 weeks post initiation of INH/B6 therapy and have clearance from provider  before infusion (Y/N:[id number])  If applicable to prescribed medication, confirm negative hepatitis B surface antigen or hepatitis C. If positive, clearance from provider before infusion. (Y/N: [id number])  Rheumatology patients receiving tocilizumab (Actemra): If labs were drawn within the past week, hold dosing until cleared to infuse If AST/ALT > 2 X upper limit normal; ANC < 1.0. NO; N/A  Patients receiving belimumab (Benlysta): Have you been having any signs of worsening depression or suicidal ideations? No; N/A

## 2024-04-05 ENCOUNTER — APPOINTMENT (OUTPATIENT)
Dept: OCCUPATIONAL MEDICINE | Facility: OTHER | Age: 34
End: 2024-04-05

## 2024-04-05 ENCOUNTER — HOSPITAL ENCOUNTER (OUTPATIENT)
Dept: GENERAL RADIOLOGY | Facility: HOSPITAL | Age: 34
Discharge: HOME | End: 2024-04-05
Attending: FAMILY MEDICINE

## 2024-04-05 ENCOUNTER — OFFICE VISIT (OUTPATIENT)
Dept: FAMILY MEDICINE | Facility: OTHER | Age: 34
End: 2024-04-05

## 2024-04-05 DIAGNOSIS — Z00.00 ANNUAL PHYSICAL EXAM: ICD-10-CM

## 2024-04-05 DIAGNOSIS — Z02.1 PHYSICAL EXAM, PRE-EMPLOYMENT: Primary | ICD-10-CM

## 2024-04-05 PROCEDURE — 999N000031 XR CHEST 2 VIEW JOB CARE

## 2024-04-08 NOTE — ANESTHESIA CARE TRANSFER NOTE
Patient: Onel White    Procedure: Procedure(s):  NEEDLE BIOPSY, LIVER, PERCUTANEOUS       Diagnosis: Abnormal LFTs [R94.5]  Diagnosis Additional Information: No value filed.    Anesthesia Type:   MAC     Note:    Oropharynx: spontaneously breathing and oropharynx clear of all foreign objects  Level of Consciousness: awake  Oxygen Supplementation: room air    Independent Airway: airway patency satisfactory and stable  Dentition: dentition unchanged  Vital Signs Stable: post-procedure vital signs reviewed and stable  Report to RN Given: handoff report given  Patient transferred to: Phase II    Handoff Report: Identifed the Patient, Identified the Reponsible Provider, Reviewed the pertinent medical history, Discussed the surgical course, Reviewed Intra-OP anesthesia mangement and issues during anesthesia, Set expectations for post-procedure period and Allowed opportunity for questions and acknowledgement of understanding      Vitals:  Vitals Value Taken Time   BP     Temp     Pulse     Resp     SpO2         Electronically Signed By: CECIL Fernandez CRNA  December 30, 2021  10:01 AM  
no chest pain/no palpitations/no dyspnea on exertion

## 2024-05-10 ENCOUNTER — INFUSION THERAPY VISIT (OUTPATIENT)
Dept: INFUSION THERAPY | Facility: CLINIC | Age: 34
End: 2024-05-10
Attending: PHYSICIAN ASSISTANT
Payer: COMMERCIAL

## 2024-05-10 VITALS
DIASTOLIC BLOOD PRESSURE: 88 MMHG | RESPIRATION RATE: 18 BRPM | WEIGHT: 203.1 LBS | HEART RATE: 66 BPM | SYSTOLIC BLOOD PRESSURE: 139 MMHG | TEMPERATURE: 98.2 F | BODY MASS INDEX: 25.39 KG/M2 | OXYGEN SATURATION: 99 %

## 2024-05-10 DIAGNOSIS — K51.011 ULCERATIVE PANCOLITIS WITH RECTAL BLEEDING (H): Primary | ICD-10-CM

## 2024-05-10 PROCEDURE — 96365 THER/PROPH/DIAG IV INF INIT: CPT

## 2024-05-10 PROCEDURE — 258N000003 HC RX IP 258 OP 636: Performed by: PHYSICIAN ASSISTANT

## 2024-05-10 PROCEDURE — 99207 PR NO CHARGE LOS: CPT

## 2024-05-10 PROCEDURE — 250N000011 HC RX IP 250 OP 636: Mod: JZ | Performed by: PHYSICIAN ASSISTANT

## 2024-05-10 RX ORDER — ALBUTEROL SULFATE 0.83 MG/ML
2.5 SOLUTION RESPIRATORY (INHALATION)
Status: CANCELLED | OUTPATIENT
Start: 2024-06-14

## 2024-05-10 RX ORDER — HEPARIN SODIUM,PORCINE 10 UNIT/ML
5-20 VIAL (ML) INTRAVENOUS DAILY PRN
Status: CANCELLED | OUTPATIENT
Start: 2024-06-14

## 2024-05-10 RX ORDER — DIPHENHYDRAMINE HYDROCHLORIDE 50 MG/ML
50 INJECTION INTRAMUSCULAR; INTRAVENOUS
Status: CANCELLED
Start: 2024-06-14

## 2024-05-10 RX ORDER — ALBUTEROL SULFATE 90 UG/1
1-2 AEROSOL, METERED RESPIRATORY (INHALATION)
Status: CANCELLED
Start: 2024-06-14

## 2024-05-10 RX ORDER — METHYLPREDNISOLONE SODIUM SUCCINATE 125 MG/2ML
125 INJECTION, POWDER, LYOPHILIZED, FOR SOLUTION INTRAMUSCULAR; INTRAVENOUS
Status: CANCELLED
Start: 2024-06-14

## 2024-05-10 RX ORDER — HEPARIN SODIUM (PORCINE) LOCK FLUSH IV SOLN 100 UNIT/ML 100 UNIT/ML
5 SOLUTION INTRAVENOUS
Status: CANCELLED | OUTPATIENT
Start: 2024-06-14

## 2024-05-10 RX ORDER — EPINEPHRINE 1 MG/ML
0.3 INJECTION, SOLUTION INTRAMUSCULAR; SUBCUTANEOUS EVERY 5 MIN PRN
Status: CANCELLED | OUTPATIENT
Start: 2024-06-14

## 2024-05-10 RX ADMIN — SODIUM CHLORIDE 250 ML: 9 INJECTION, SOLUTION INTRAVENOUS at 08:16

## 2024-05-10 RX ADMIN — VEDOLIZUMAB 300 MG: 300 INJECTION, POWDER, LYOPHILIZED, FOR SOLUTION INTRAVENOUS at 08:24

## 2024-05-10 NOTE — PROGRESS NOTES
Infusion Nursing Note:  Onel White presents today for Entyvio.    Patient seen by provider today: No   present during visit today: Not Applicable.    Note: Patient reports some bloody stools for about a week, describes the amount as a couple tablespoons of bright red blood. Has not happened in about a week. Advised him if this reoccurs and is not getting better/getting worse, to reach out to his GI provider. Has been having some back  pain he thinks related to poor lifting techniques with his job. Otherwise denies new medical changes. Reports tolerating treatments well.     Intravenous Access:  Peripheral IV placed.    Treatment Conditions:  Biological Infusion Checklist:  ~~~ NOTE: If the patient answers yes to any of the questions below, hold the infusion and contact ordering provider or on-call provider.    Have you recently had an elevated temperature, fever, chills, productive cough, coughing for 3 weeks or longer or hemoptysis,  abnormal vital signs, night sweats,  chest pain or have you noticed a decrease in your appetite, unexplained weight loss or fatigue? No  Do you have any open wounds or new incisions? No  Do you have any upcoming hospitalizations or surgeries? Does not include esophagogastroduodenoscopy, colonoscopy, endoscopic retrograde cholangiopancreatography (ERCP), endoscopic ultrasound (EUS), dental procedures or joint aspiration/steroid injections No  Do you currently have any signs of illness or infection or are you on any antibiotics? No  Have you had any new, sudden or worsening abdominal pain? No  Have you or anyone in your household received a live vaccination in the past 4 weeks? Please note: No live vaccines while on biologic/chemotherapy until 6 months after the last treatment. Patient can receive the flu vaccine (shot only), pneumovax and the Covid vaccine. It is optimal for the patient to get these vaccines mid cycle, but they can be given at any time as long as it is not  on the day of the infusion. No  Have you recently been diagnosed with any new nervous system diseases (ie. Multiple sclerosis, Guillain [place], seizures, neurological changes) or cancer diagnosis? Are you on any form of radiation or chemotherapy? No  Are you pregnant or breast feeding or do you have plans of pregnancy in the future? No  Have there been any other new onset medical symptoms? No      Post Infusion Assessment:  Patient tolerated infusion without incident.  Blood return noted pre and post infusion.  Site patent and intact, free from redness, edema or discomfort.  No evidence of extravasations.  Access discontinued per protocol.  Biologic Infusion Post Education: Call the triage nurse at your clinic or seek medical attention if you have chills and/or temperature greater than or equal to 100.5, uncontrolled nausea/vomiting, diarrhea, constipation, dizziness, shortness of breath, chest pain, heart palpitations, weakness or any other new or concerning symptoms, questions or concerns.  You cannot have any live virus vaccines prior to or during treatment or up to 6 months post infusion.  If you have an upcoming surgery, medical procedure or dental procedure during treatment, this should be discussed with your ordering physician and your surgeon/dentist.  If you are having any concerning symptom, if you are unsure if you should get your next infusion or wish to speak to a provider before your next infusion, please call your care coordinator or triage nurse at your clinic to notify them so we can adequately serve you.       Discharge Plan:   Patient and/or family verbalized understanding of discharge instructions and all questions answered.  AVS to patient via [facility].  Patient will return 6/21 for next appointment with labs. Future appts have been reviewed and crosschecked with appt note and plan.  Patient discharged in stable condition accompanied by: self.  Departure Mode: Ambulatory.      Sharon Aranda RN

## 2024-05-22 ENCOUNTER — MYC MEDICAL ADVICE (OUTPATIENT)
Dept: FAMILY MEDICINE | Facility: CLINIC | Age: 34
End: 2024-05-22
Payer: COMMERCIAL

## 2024-05-22 ENCOUNTER — TELEPHONE (OUTPATIENT)
Dept: FAMILY MEDICINE | Facility: CLINIC | Age: 34
End: 2024-05-22
Payer: COMMERCIAL

## 2024-05-22 NOTE — TELEPHONE ENCOUNTER
Patient Quality Outreach    Patient is due for the following:   Depression  -  PHQ-9 needed  Physical Preventive Adult Physical    Next Steps:   Schedule a Adult Preventative  Patient was assigned appropriate questionnaire to complete    Type of outreach:    Sent [employer] message.      Questions for provider review:    None           Emerald Franco MA

## 2024-06-14 ASSESSMENT — PATIENT HEALTH QUESTIONNAIRE - PHQ9
10. IF YOU CHECKED OFF ANY PROBLEMS, HOW DIFFICULT HAVE THESE PROBLEMS MADE IT FOR YOU TO DO YOUR WORK, TAKE CARE OF THINGS AT HOME, OR GET ALONG WITH OTHER PEOPLE: SOMEWHAT DIFFICULT
SUM OF ALL RESPONSES TO PHQ QUESTIONS 1-9: 10
SUM OF ALL RESPONSES TO PHQ QUESTIONS 1-9: 10

## 2024-06-21 ENCOUNTER — INFUSION THERAPY VISIT (OUTPATIENT)
Dept: INFUSION THERAPY | Facility: OTHER | Age: 34
End: 2024-06-21
Attending: PHYSICIAN ASSISTANT
Payer: COMMERCIAL

## 2024-06-21 VITALS
HEIGHT: 75 IN | DIASTOLIC BLOOD PRESSURE: 79 MMHG | SYSTOLIC BLOOD PRESSURE: 124 MMHG | BODY MASS INDEX: 24.86 KG/M2 | WEIGHT: 199.96 LBS | TEMPERATURE: 97.1 F | OXYGEN SATURATION: 98 % | HEART RATE: 56 BPM

## 2024-06-21 DIAGNOSIS — K51.011 ULCERATIVE PANCOLITIS WITH RECTAL BLEEDING (H): Primary | ICD-10-CM

## 2024-06-21 LAB
ALBUMIN SERPL BCG-MCNC: 4.3 G/DL (ref 3.5–5.2)
ALP SERPL-CCNC: 109 U/L (ref 40–150)
ALT SERPL W P-5'-P-CCNC: 73 U/L (ref 0–70)
AST SERPL W P-5'-P-CCNC: 56 U/L (ref 0–45)
BASOPHILS # BLD AUTO: 0 10E3/UL (ref 0–0.2)
BASOPHILS NFR BLD AUTO: 1 %
BILIRUB DIRECT SERPL-MCNC: <0.2 MG/DL (ref 0–0.3)
BILIRUB SERPL-MCNC: 0.4 MG/DL
CRP SERPL-MCNC: <3 MG/L
EOSINOPHIL # BLD AUTO: 0.3 10E3/UL (ref 0–0.7)
EOSINOPHIL NFR BLD AUTO: 8 %
ERYTHROCYTE [DISTWIDTH] IN BLOOD BY AUTOMATED COUNT: 12 % (ref 10–15)
ERYTHROCYTE [SEDIMENTATION RATE] IN BLOOD BY WESTERGREN METHOD: 6 MM/HR (ref 0–15)
HCT VFR BLD AUTO: 43.5 % (ref 40–53)
HGB BLD-MCNC: 14.9 G/DL (ref 13.3–17.7)
IMM GRANULOCYTES # BLD: 0 10E3/UL
IMM GRANULOCYTES NFR BLD: 0 %
LYMPHOCYTES # BLD AUTO: 1 10E3/UL (ref 0.8–5.3)
LYMPHOCYTES NFR BLD AUTO: 25 %
MCH RBC QN AUTO: 30.5 PG (ref 26.5–33)
MCHC RBC AUTO-ENTMCNC: 34.3 G/DL (ref 31.5–36.5)
MCV RBC AUTO: 89 FL (ref 78–100)
MONOCYTES # BLD AUTO: 0.5 10E3/UL (ref 0–1.3)
MONOCYTES NFR BLD AUTO: 11 %
NEUTROPHILS # BLD AUTO: 2.2 10E3/UL (ref 1.6–8.3)
NEUTROPHILS NFR BLD AUTO: 54 %
NRBC # BLD AUTO: 0 10E3/UL
NRBC BLD AUTO-RTO: 0 /100
PLATELET # BLD AUTO: 199 10E3/UL (ref 150–450)
PROT SERPL-MCNC: 7.2 G/DL (ref 6.4–8.3)
RBC # BLD AUTO: 4.89 10E6/UL (ref 4.4–5.9)
WBC # BLD AUTO: 4 10E3/UL (ref 4–11)

## 2024-06-21 PROCEDURE — 80076 HEPATIC FUNCTION PANEL: CPT | Performed by: INTERNAL MEDICINE

## 2024-06-21 PROCEDURE — 85025 COMPLETE CBC W/AUTO DIFF WBC: CPT | Performed by: FAMILY MEDICINE

## 2024-06-21 PROCEDURE — 36415 COLL VENOUS BLD VENIPUNCTURE: CPT | Performed by: INTERNAL MEDICINE

## 2024-06-21 PROCEDURE — 86140 C-REACTIVE PROTEIN: CPT | Performed by: INTERNAL MEDICINE

## 2024-06-21 PROCEDURE — 85652 RBC SED RATE AUTOMATED: CPT | Performed by: INTERNAL MEDICINE

## 2024-06-21 PROCEDURE — 96365 THER/PROPH/DIAG IV INF INIT: CPT | Performed by: INTERNAL MEDICINE

## 2024-06-21 RX ORDER — HEPARIN SODIUM,PORCINE 10 UNIT/ML
5-20 VIAL (ML) INTRAVENOUS DAILY PRN
Status: CANCELLED | OUTPATIENT
Start: 2024-08-02

## 2024-06-21 RX ORDER — DIPHENHYDRAMINE HYDROCHLORIDE 50 MG/ML
50 INJECTION INTRAMUSCULAR; INTRAVENOUS
Status: CANCELLED
Start: 2024-08-02

## 2024-06-21 RX ORDER — ALBUTEROL SULFATE 0.83 MG/ML
2.5 SOLUTION RESPIRATORY (INHALATION)
Status: CANCELLED | OUTPATIENT
Start: 2024-08-02

## 2024-06-21 RX ORDER — HEPARIN SODIUM (PORCINE) LOCK FLUSH IV SOLN 100 UNIT/ML 100 UNIT/ML
5 SOLUTION INTRAVENOUS
Status: CANCELLED | OUTPATIENT
Start: 2024-08-02

## 2024-06-21 RX ORDER — EPINEPHRINE 1 MG/ML
0.3 INJECTION, SOLUTION, CONCENTRATE INTRAVENOUS EVERY 5 MIN PRN
Status: CANCELLED | OUTPATIENT
Start: 2024-08-02

## 2024-06-21 RX ORDER — METHYLPREDNISOLONE SODIUM SUCCINATE 125 MG/2ML
125 INJECTION, POWDER, LYOPHILIZED, FOR SOLUTION INTRAMUSCULAR; INTRAVENOUS
Status: CANCELLED
Start: 2024-08-02

## 2024-06-21 RX ORDER — ALBUTEROL SULFATE 90 UG/1
1-2 AEROSOL, METERED RESPIRATORY (INHALATION)
Status: CANCELLED
Start: 2024-08-02

## 2024-06-21 RX ADMIN — Medication 250 ML: at 10:15

## 2024-06-21 NOTE — PROGRESS NOTES
Infusion Nursing Note:  Onel White presents today for entyvio.    Patient seen by provider today: No   present during visit today: Not Applicable.    Note: N/A.      Intravenous Access:  Labs drawn without difficulty.  Peripheral IV placed.    Treatment Conditions:  Biological Infusion Checklist:  ~~~ NOTE: If the patient answers yes to any of the questions below, hold the infusion and contact ordering provider or on-call provider.    Have you recently had an elevated temperature, fever, chills, productive cough, coughing for 3 weeks or longer or hemoptysis,  abnormal vital signs, night sweats,  chest pain or have you noticed a decrease in your appetite, unexplained weight loss or fatigue? No  Do you have any open wounds or new incisions? No  Do you have any upcoming hospitalizations or surgeries? Does not include esophagogastroduodenoscopy, colonoscopy, endoscopic retrograde cholangiopancreatography (ERCP), endoscopic ultrasound (EUS), dental procedures or joint aspiration/steroid injections No  Do you currently have any signs of illness or infection or are you on any antibiotics? No  Have you had any new, sudden or worsening abdominal pain? No  Have you or anyone in your household received a live vaccination in the past 4 weeks? Please note: No live vaccines while on biologic/chemotherapy until 6 months after the last treatment. Patient can receive the flu vaccine (shot only), pneumovax and the Covid vaccine. It is optimal for the patient to get these vaccines mid cycle, but they can be given at any time as long as it is not on the day of the infusion. No  Have you recently been diagnosed with any new nervous system diseases (ie. Multiple sclerosis, Guillain [place], seizures, neurological changes) or cancer diagnosis? Are you on any form of radiation or chemotherapy? No  Are you pregnant or breast feeding or do you have plans of pregnancy in the future? No  Have you been having any signs of worsening  depression or suicidal ideations?  (benlysta only) No  Have there been any other new onset medical symptoms? No  Have you had any new blood clots? (IVIG only)     Post Infusion Assessment:  Biologic Infusion Post Education: Call the triage nurse at your clinic or seek medical attention if you have chills and/or temperature greater than or equal to 100.5, uncontrolled nausea/vomiting, diarrhea, constipation, dizziness, shortness of breath, chest pain, heart palpitations, weakness or any other new or concerning symptoms, questions or concerns.  You cannot have any live virus vaccines prior to or during treatment or up to 6 months post infusion.  If you have an upcoming surgery, medical procedure or dental procedure during treatment, this should be discussed with your ordering physician and your surgeon/dentist.  If you are having any concerning symptom, if you are unsure if you should get your next infusion or wish to speak to a provider before your next infusion, please call your care coordinator or triage nurse at your clinic to notify them so we can adequately serve you.       Discharge Plan:   Patient and/or family verbalized understanding of discharge instructions and all questions answered.      MIRACLE MATAMOROS

## 2024-06-27 DIAGNOSIS — K83.8 INTRAHEPATIC BILE DUCT DILATION: ICD-10-CM

## 2024-06-27 DIAGNOSIS — K51.011 ULCERATIVE PANCOLITIS WITH RECTAL BLEEDING (H): Primary | ICD-10-CM

## 2024-06-27 DIAGNOSIS — R79.89 ELEVATED LFTS: ICD-10-CM

## 2024-06-27 NOTE — RESULT ENCOUNTER NOTE
Binu Sykes,    My name is Thomas Alvarez and I am one of Abel Ivan's partners.  I am helping cover her patients now that she has left our group.  Your recent set of labs are available for you to review.  The blood counts and inflammatory markers look normal, but there has been some mild increase in your liver enzymes.  I do see that you have had liver imaging and biopsies done in the past, but it has been a few years now.  I think we should update imaging of your liver to see if there has been any progression on the previously seen bile duct prominence.  I will place an order for an MRCP, you can call [phone] to schedule.  I would also like to see you in the office so we can continue your care.  One of my schedulers will contact you to offer some dates and times to come in in the next 1 to 2 months.    Please let me know if you have any questions.    Sincerely,  Thomas PATEL [facility] GI, Hepatology, and Nutrition

## 2024-08-02 ENCOUNTER — INFUSION THERAPY VISIT (OUTPATIENT)
Dept: INFUSION THERAPY | Facility: CLINIC | Age: 34
End: 2024-08-02
Attending: NURSE PRACTITIONER
Payer: COMMERCIAL

## 2024-08-02 ENCOUNTER — ANCILLARY PROCEDURE (OUTPATIENT)
Dept: MRI IMAGING | Facility: CLINIC | Age: 34
End: 2024-08-02
Attending: PHYSICIAN ASSISTANT
Payer: COMMERCIAL

## 2024-08-02 VITALS
OXYGEN SATURATION: 99 % | HEART RATE: 63 BPM | SYSTOLIC BLOOD PRESSURE: 117 MMHG | WEIGHT: 203 LBS | RESPIRATION RATE: 16 BRPM | BODY MASS INDEX: 25.37 KG/M2 | DIASTOLIC BLOOD PRESSURE: 67 MMHG | TEMPERATURE: 97.6 F

## 2024-08-02 DIAGNOSIS — K83.8 INTRAHEPATIC BILE DUCT DILATION: ICD-10-CM

## 2024-08-02 DIAGNOSIS — K51.011 ULCERATIVE PANCOLITIS WITH RECTAL BLEEDING (H): Primary | ICD-10-CM

## 2024-08-02 DIAGNOSIS — K51.011 ULCERATIVE PANCOLITIS WITH RECTAL BLEEDING (H): ICD-10-CM

## 2024-08-02 DIAGNOSIS — R79.89 ELEVATED LFTS: ICD-10-CM

## 2024-08-02 LAB
ALBUMIN SERPL BCG-MCNC: 4.6 G/DL (ref 3.5–5.2)
ALP SERPL-CCNC: 91 U/L (ref 40–150)
ALT SERPL W P-5'-P-CCNC: 31 U/L (ref 0–70)
AST SERPL W P-5'-P-CCNC: 37 U/L (ref 0–45)
BASOPHILS # BLD AUTO: 0.1 10E3/UL (ref 0–0.2)
BASOPHILS NFR BLD AUTO: 1 %
BILIRUB DIRECT SERPL-MCNC: 0.26 MG/DL (ref 0–0.3)
BILIRUB SERPL-MCNC: 1 MG/DL
CRP SERPL-MCNC: <3 MG/L
EOSINOPHIL # BLD AUTO: 0.3 10E3/UL (ref 0–0.7)
EOSINOPHIL NFR BLD AUTO: 7 %
ERYTHROCYTE [DISTWIDTH] IN BLOOD BY AUTOMATED COUNT: 12.1 % (ref 10–15)
HCT VFR BLD AUTO: 43.6 % (ref 40–53)
HGB BLD-MCNC: 15 G/DL (ref 13.3–17.7)
HOLD SPECIMEN: NORMAL
IMM GRANULOCYTES # BLD: 0 10E3/UL
IMM GRANULOCYTES NFR BLD: 0 %
LYMPHOCYTES # BLD AUTO: 1.2 10E3/UL (ref 0.8–5.3)
LYMPHOCYTES NFR BLD AUTO: 25 %
MCH RBC QN AUTO: 30.5 PG (ref 26.5–33)
MCHC RBC AUTO-ENTMCNC: 34.4 G/DL (ref 31.5–36.5)
MCV RBC AUTO: 89 FL (ref 78–100)
MONOCYTES # BLD AUTO: 0.5 10E3/UL (ref 0–1.3)
MONOCYTES NFR BLD AUTO: 10 %
NEUTROPHILS # BLD AUTO: 2.7 10E3/UL (ref 1.6–8.3)
NEUTROPHILS NFR BLD AUTO: 56 %
NRBC # BLD AUTO: 0 10E3/UL
NRBC BLD AUTO-RTO: 0 /100
PLATELET # BLD AUTO: 205 10E3/UL (ref 150–450)
PROT SERPL-MCNC: 7.6 G/DL (ref 6.4–8.3)
RBC # BLD AUTO: 4.92 10E6/UL (ref 4.4–5.9)
WBC # BLD AUTO: 4.8 10E3/UL (ref 4–11)

## 2024-08-02 PROCEDURE — A9585 GADOBUTROL INJECTION: HCPCS | Performed by: RADIOLOGY

## 2024-08-02 PROCEDURE — 85025 COMPLETE CBC W/AUTO DIFF WBC: CPT

## 2024-08-02 PROCEDURE — 258N000003 HC RX IP 258 OP 636: Performed by: PHYSICIAN ASSISTANT

## 2024-08-02 PROCEDURE — 99207 PR NO CHARGE LOS: CPT

## 2024-08-02 PROCEDURE — 36415 COLL VENOUS BLD VENIPUNCTURE: CPT

## 2024-08-02 PROCEDURE — 86140 C-REACTIVE PROTEIN: CPT

## 2024-08-02 PROCEDURE — 250N000011 HC RX IP 250 OP 636: Performed by: PHYSICIAN ASSISTANT

## 2024-08-02 PROCEDURE — 96365 THER/PROPH/DIAG IV INF INIT: CPT

## 2024-08-02 PROCEDURE — 82247 BILIRUBIN TOTAL: CPT

## 2024-08-02 PROCEDURE — 74183 MRI ABD W/O CNTR FLWD CNTR: CPT | Performed by: RADIOLOGY

## 2024-08-02 RX ORDER — GADOBUTROL 604.72 MG/ML
9 INJECTION INTRAVENOUS ONCE
Status: COMPLETED | OUTPATIENT
Start: 2024-08-02 | End: 2024-08-02

## 2024-08-02 RX ORDER — ALBUTEROL SULFATE 90 UG/1
1-2 AEROSOL, METERED RESPIRATORY (INHALATION)
Status: CANCELLED
Start: 2024-09-13

## 2024-08-02 RX ORDER — METHYLPREDNISOLONE SODIUM SUCCINATE 125 MG/2ML
125 INJECTION, POWDER, LYOPHILIZED, FOR SOLUTION INTRAMUSCULAR; INTRAVENOUS
Status: CANCELLED
Start: 2024-09-13

## 2024-08-02 RX ORDER — DIPHENHYDRAMINE HYDROCHLORIDE 50 MG/ML
50 INJECTION INTRAMUSCULAR; INTRAVENOUS
Status: CANCELLED
Start: 2024-09-13

## 2024-08-02 RX ORDER — HEPARIN SODIUM,PORCINE 10 UNIT/ML
5-20 VIAL (ML) INTRAVENOUS DAILY PRN
Status: CANCELLED | OUTPATIENT
Start: 2024-09-13

## 2024-08-02 RX ORDER — EPINEPHRINE 1 MG/ML
0.3 INJECTION, SOLUTION INTRAMUSCULAR; SUBCUTANEOUS EVERY 5 MIN PRN
Status: CANCELLED | OUTPATIENT
Start: 2024-09-13

## 2024-08-02 RX ORDER — HEPARIN SODIUM (PORCINE) LOCK FLUSH IV SOLN 100 UNIT/ML 100 UNIT/ML
5 SOLUTION INTRAVENOUS
Status: CANCELLED | OUTPATIENT
Start: 2024-09-13

## 2024-08-02 RX ORDER — ALBUTEROL SULFATE 0.83 MG/ML
2.5 SOLUTION RESPIRATORY (INHALATION)
Status: CANCELLED | OUTPATIENT
Start: 2024-09-13

## 2024-08-02 RX ADMIN — SODIUM CHLORIDE 250 ML: 9 INJECTION, SOLUTION INTRAVENOUS at 14:20

## 2024-08-02 RX ADMIN — GADOBUTROL 9 ML: 604.72 INJECTION INTRAVENOUS at 08:11

## 2024-08-02 RX ADMIN — VEDOLIZUMAB 300 MG: 300 INJECTION, POWDER, LYOPHILIZED, FOR SOLUTION INTRAVENOUS at 14:25

## 2024-08-02 NOTE — PROGRESS NOTES
Infusion Nursing Note:  Onel White presents today for Entyvio.    Patient seen by provider today: No   present during visit today: Not Applicable.    Note: Onel had a scan this morning, IV is place, we will use it for infusion today.  Otherwise Onel states things are pretty  much unchanged.        Intravenous Access:  Peripheral IV placed.    Treatment Conditions:  Biological Infusion Checklist:  ~~~ NOTE: If the patient answers yes to any of the questions below, hold the infusion and contact ordering provider or on-call provider.    Have you recently had an elevated temperature, fever, chills, productive cough, coughing for 3 weeks or longer or hemoptysis,  abnormal vital signs, night sweats,  chest pain or have you noticed a decrease in your appetite, unexplained weight loss or fatigue? No  Do you have any open wounds or new incisions? No  Do you have any upcoming hospitalizations or surgeries? Does not include esophagogastroduodenoscopy, colonoscopy, endoscopic retrograde cholangiopancreatography (ERCP), endoscopic ultrasound (EUS), dental procedures or joint aspiration/steroid injections No  Do you currently have any signs of illness or infection or are you on any antibiotics? No  Have you had any new, sudden or worsening abdominal pain? No  Have you or anyone in your household received a live vaccination in the past 4 weeks? Please note: No live vaccines while on biologic/chemotherapy until 6 months after the last treatment. Patient can receive the flu vaccine (shot only), pneumovax and the Covid vaccine. It is optimal for the patient to get these vaccines mid cycle, but they can be given at any time as long as it is not on the day of the infusion. No  Have you recently been diagnosed with any new nervous system diseases (ie. Multiple sclerosis, Guillain [place], seizures, neurological changes) or cancer diagnosis? Are you on any form of radiation or chemotherapy? No  Are you pregnant or breast  feeding or do you have plans of pregnancy in the future? No  Have you been having any signs of worsening depression or suicidal ideations?  (benlysta only) No  Have there been any other new onset medical symptoms? No  Have you had any new blood clots? (IVIG only) No      Post Infusion Assessment:  Patient tolerated infusion without incident.       Discharge Plan:   AVS to patient via [facility].    Message sent to scheduling for set up next appointment as patient did not top at desk on his way out.    Patient discharged in stable condition accompanied by: lynnette Kelly RN

## 2024-08-06 NOTE — RESULT ENCOUNTER NOTE
Binu Sykes,    The results from your recent MRCP are available for you to review.  Overall, the bile ducts look stable and liver does not show any abnormal lesions.  Your recent blood tests show that your liver enzymes have normalized as well, so I do not think we need to take any further steps here.    Please let me know if you have any questions.    Sincerely,  Thomas PATEL [facility] GI, Hepatology, and Nutrition

## 2024-09-20 ENCOUNTER — INFUSION THERAPY VISIT (OUTPATIENT)
Dept: INFUSION THERAPY | Facility: CLINIC | Age: 34
End: 2024-09-20
Attending: PHYSICIAN ASSISTANT
Payer: COMMERCIAL

## 2024-09-20 VITALS
SYSTOLIC BLOOD PRESSURE: 134 MMHG | TEMPERATURE: 97 F | BODY MASS INDEX: 25.71 KG/M2 | RESPIRATION RATE: 16 BRPM | DIASTOLIC BLOOD PRESSURE: 90 MMHG | HEART RATE: 64 BPM | OXYGEN SATURATION: 98 % | WEIGHT: 205.7 LBS

## 2024-09-20 DIAGNOSIS — K51.011 ULCERATIVE PANCOLITIS WITH RECTAL BLEEDING (H): Primary | ICD-10-CM

## 2024-09-20 PROCEDURE — 99207 PR NO CHARGE LOS: CPT

## 2024-09-20 PROCEDURE — 250N000011 HC RX IP 250 OP 636: Performed by: PHYSICIAN ASSISTANT

## 2024-09-20 PROCEDURE — 258N000003 HC RX IP 258 OP 636: Performed by: PHYSICIAN ASSISTANT

## 2024-09-20 PROCEDURE — 96365 THER/PROPH/DIAG IV INF INIT: CPT

## 2024-09-20 RX ORDER — EPINEPHRINE 1 MG/ML
0.3 INJECTION, SOLUTION INTRAMUSCULAR; SUBCUTANEOUS EVERY 5 MIN PRN
OUTPATIENT
Start: 2024-10-25

## 2024-09-20 RX ORDER — HEPARIN SODIUM,PORCINE 10 UNIT/ML
5-20 VIAL (ML) INTRAVENOUS DAILY PRN
OUTPATIENT
Start: 2024-10-25

## 2024-09-20 RX ORDER — METHYLPREDNISOLONE SODIUM SUCCINATE 125 MG/2ML
125 INJECTION, POWDER, LYOPHILIZED, FOR SOLUTION INTRAMUSCULAR; INTRAVENOUS
Start: 2024-10-25

## 2024-09-20 RX ORDER — HEPARIN SODIUM (PORCINE) LOCK FLUSH IV SOLN 100 UNIT/ML 100 UNIT/ML
5 SOLUTION INTRAVENOUS
OUTPATIENT
Start: 2024-10-25

## 2024-09-20 RX ORDER — ALBUTEROL SULFATE 90 UG/1
1-2 AEROSOL, METERED RESPIRATORY (INHALATION)
Start: 2024-10-25

## 2024-09-20 RX ORDER — DIPHENHYDRAMINE HYDROCHLORIDE 50 MG/ML
50 INJECTION INTRAMUSCULAR; INTRAVENOUS
Start: 2024-10-25

## 2024-09-20 RX ORDER — ALBUTEROL SULFATE 0.83 MG/ML
2.5 SOLUTION RESPIRATORY (INHALATION)
OUTPATIENT
Start: 2024-10-25

## 2024-09-20 RX ADMIN — SODIUM CHLORIDE 250 ML: 9 INJECTION, SOLUTION INTRAVENOUS at 10:30

## 2024-09-20 RX ADMIN — VEDOLIZUMAB 300 MG: 300 INJECTION, POWDER, LYOPHILIZED, FOR SOLUTION INTRAVENOUS at 10:31

## 2024-09-20 NOTE — PROGRESS NOTES
Infusion Nursing Note:  Onel White presents today for Entyvio.    Patient seen by provider today: No   present during visit today: Not Applicable.    Note: Patient notes having red/itching spots on bilateral forearms. States he believes it is poison ivy from a month ago. Some scabbed areas noted but no signs of infection. Reviewed this with covering infusion provider and she advised patient to reach out to primary to assess the need for a prescription. Patient verbalized understanding.    Intravenous Access:  Peripheral IV placed.    Treatment Conditions:  Biological Infusion Checklist:  ~~~ NOTE: If the patient answers yes to any of the questions below, hold the infusion and contact ordering provider or on-call provider.    Have you recently had an elevated temperature, fever, chills, productive cough, coughing for 3 weeks or longer or hemoptysis,  abnormal vital signs, night sweats,  chest pain or have you noticed a decrease in your appetite, unexplained weight loss or fatigue? No  Do you have any open wounds or new incisions? No  Do you have any upcoming hospitalizations or surgeries? Does not include esophagogastroduodenoscopy, colonoscopy, endoscopic retrograde cholangiopancreatography (ERCP), endoscopic ultrasound (EUS), dental procedures or joint aspiration/steroid injections No  Do you currently have any signs of illness or infection or are you on any antibiotics? No  Have you had any new, sudden or worsening abdominal pain? No  Have you or anyone in your household received a live vaccination in the past 4 weeks? Please note: No live vaccines while on biologic/chemotherapy until 6 months after the last treatment. Patient can receive the flu vaccine (shot only), pneumovax and the Covid vaccine. It is optimal for the patient to get these vaccines mid cycle, but they can be given at any time as long as it is not on the day of the infusion. No  Have you recently been diagnosed with any new nervous  system diseases (ie. Multiple sclerosis, Guillain [place], seizures, neurological changes) or cancer diagnosis? Are you on any form of radiation or chemotherapy? No  Are you pregnant or breast feeding or do you have plans of pregnancy in the future? No  Have you been having any signs of worsening depression or suicidal ideations?  (benlysta only) No  Have there been any other new onset medical symptoms? No  Have you had any new blood clots? (IVIG only) No    Post Infusion Assessment:  Patient tolerated infusion without incident.  Site patent and intact, free from redness, edema or discomfort.  No evidence of extravasations.  Access discontinued per protocol.  Biologic Infusion Post Education: Call the triage nurse at your clinic or seek medical attention if you have chills and/or temperature greater than or equal to 100.5, uncontrolled nausea/vomiting, diarrhea, constipation, dizziness, shortness of breath, chest pain, heart palpitations, weakness or any other new or concerning symptoms, questions or concerns.  You cannot have any live virus vaccines prior to or during treatment or up to 6 months post infusion.  If you have an upcoming surgery, medical procedure or dental procedure during treatment, this should be discussed with your ordering physician and your surgeon/dentist.  If you are having any concerning symptom, if you are unsure if you should get your next infusion or wish to speak to a provider before your next infusion, please call your care coordinator or triage nurse at your clinic to notify them so we can adequately serve you.     Discharge Plan:   Future appts have been reviewed and crosschecked with appt note and plan.  AVS to patient via [employer].  Patient will return 11/8/24 for next appointment.   Patient discharged in stable condition accompanied by: self.  Departure Mode: Ambulatory.      Carmen Adair, RN BSN OCN

## 2024-09-23 ENCOUNTER — TELEPHONE (OUTPATIENT)
Dept: GASTROENTEROLOGY | Facility: CLINIC | Age: 34
End: 2024-09-23
Payer: COMMERCIAL

## 2024-09-23 NOTE — TELEPHONE ENCOUNTER
Pt is due for 6 mo follow up with Rafa BREWSTER     Call placed to pt to initiate scheduling on 9/23    Outcome: Pt stated things are going well and he has no concerns or questions at this time. Pt did agree to a 1 year follow up

## 2024-10-29 RX ORDER — METHYLPREDNISOLONE SODIUM SUCCINATE 40 MG/ML
40 INJECTION INTRAMUSCULAR; INTRAVENOUS
Status: CANCELLED
Start: 2024-10-29

## 2024-10-29 RX ORDER — MEPERIDINE HYDROCHLORIDE 25 MG/ML
25 INJECTION INTRAMUSCULAR; INTRAVENOUS; SUBCUTANEOUS
Status: CANCELLED | OUTPATIENT
Start: 2024-10-29

## 2024-10-29 RX ORDER — ALBUTEROL SULFATE 90 UG/1
1-2 INHALANT RESPIRATORY (INHALATION)
Status: CANCELLED
Start: 2024-10-29

## 2024-10-29 RX ORDER — DIPHENHYDRAMINE HYDROCHLORIDE 50 MG/ML
50 INJECTION INTRAMUSCULAR; INTRAVENOUS
Status: CANCELLED
Start: 2024-10-29

## 2024-10-29 RX ORDER — DIPHENHYDRAMINE HYDROCHLORIDE 50 MG/ML
25 INJECTION INTRAMUSCULAR; INTRAVENOUS
Status: CANCELLED
Start: 2024-10-29

## 2024-10-29 RX ORDER — EPINEPHRINE 1 MG/ML
0.3 INJECTION, SOLUTION INTRAMUSCULAR; SUBCUTANEOUS EVERY 5 MIN PRN
Status: CANCELLED | OUTPATIENT
Start: 2024-10-29

## 2024-10-29 RX ORDER — ALBUTEROL SULFATE 0.83 MG/ML
2.5 SOLUTION RESPIRATORY (INHALATION)
Status: CANCELLED | OUTPATIENT
Start: 2024-10-29

## 2024-11-08 ENCOUNTER — LAB (OUTPATIENT)
Dept: INFUSION THERAPY | Facility: CLINIC | Age: 34
End: 2024-11-08
Attending: NURSE PRACTITIONER
Payer: COMMERCIAL

## 2024-11-08 ENCOUNTER — INFUSION THERAPY VISIT (OUTPATIENT)
Dept: INFUSION THERAPY | Facility: CLINIC | Age: 34
End: 2024-11-08
Attending: PHYSICIAN ASSISTANT
Payer: COMMERCIAL

## 2024-11-08 VITALS
SYSTOLIC BLOOD PRESSURE: 141 MMHG | HEART RATE: 60 BPM | BODY MASS INDEX: 25.57 KG/M2 | RESPIRATION RATE: 16 BRPM | DIASTOLIC BLOOD PRESSURE: 85 MMHG | WEIGHT: 204.6 LBS | OXYGEN SATURATION: 99 % | TEMPERATURE: 98.2 F

## 2024-11-08 DIAGNOSIS — K51.011 ULCERATIVE PANCOLITIS WITH RECTAL BLEEDING (H): ICD-10-CM

## 2024-11-08 DIAGNOSIS — K51.011 ULCERATIVE PANCOLITIS WITH RECTAL BLEEDING (H): Primary | ICD-10-CM

## 2024-11-08 LAB
ALBUMIN SERPL BCG-MCNC: 4.4 G/DL (ref 3.5–5.2)
ALP SERPL-CCNC: 160 U/L (ref 40–150)
ALT SERPL W P-5'-P-CCNC: 100 U/L (ref 0–70)
AST SERPL W P-5'-P-CCNC: 96 U/L (ref 0–45)
BASOPHILS # BLD AUTO: 0 10E3/UL (ref 0–0.2)
BASOPHILS NFR BLD AUTO: 1 %
BILIRUB DIRECT SERPL-MCNC: <0.2 MG/DL (ref 0–0.3)
BILIRUB SERPL-MCNC: 0.3 MG/DL
CRP SERPL-MCNC: <3 MG/L
EOSINOPHIL # BLD AUTO: 1 10E3/UL (ref 0–0.7)
EOSINOPHIL NFR BLD AUTO: 17 %
ERYTHROCYTE [DISTWIDTH] IN BLOOD BY AUTOMATED COUNT: 11.9 % (ref 10–15)
HCT VFR BLD AUTO: 45.4 % (ref 40–53)
HGB BLD-MCNC: 15.5 G/DL (ref 13.3–17.7)
HOLD SPECIMEN: NORMAL
IMM GRANULOCYTES # BLD: 0 10E3/UL
IMM GRANULOCYTES NFR BLD: 0 %
LYMPHOCYTES # BLD AUTO: 1.1 10E3/UL (ref 0.8–5.3)
LYMPHOCYTES NFR BLD AUTO: 18 %
MCH RBC QN AUTO: 31.1 PG (ref 26.5–33)
MCHC RBC AUTO-ENTMCNC: 34.1 G/DL (ref 31.5–36.5)
MCV RBC AUTO: 91 FL (ref 78–100)
MONOCYTES # BLD AUTO: 0.5 10E3/UL (ref 0–1.3)
MONOCYTES NFR BLD AUTO: 9 %
NEUTROPHILS # BLD AUTO: 3.4 10E3/UL (ref 1.6–8.3)
NEUTROPHILS NFR BLD AUTO: 56 %
NRBC # BLD AUTO: 0 10E3/UL
NRBC BLD AUTO-RTO: 0 /100
PLATELET # BLD AUTO: 201 10E3/UL (ref 150–450)
PROT SERPL-MCNC: 7.5 G/DL (ref 6.4–8.3)
RBC # BLD AUTO: 4.99 10E6/UL (ref 4.4–5.9)
WBC # BLD AUTO: 6 10E3/UL (ref 4–11)

## 2024-11-08 PROCEDURE — 258N000003 HC RX IP 258 OP 636: Performed by: PHYSICIAN ASSISTANT

## 2024-11-08 PROCEDURE — 96365 THER/PROPH/DIAG IV INF INIT: CPT

## 2024-11-08 PROCEDURE — 250N000011 HC RX IP 250 OP 636: Mod: JZ | Performed by: PHYSICIAN ASSISTANT

## 2024-11-08 PROCEDURE — 86140 C-REACTIVE PROTEIN: CPT

## 2024-11-08 PROCEDURE — 99207 PR NO CHARGE LOS: CPT

## 2024-11-08 PROCEDURE — 82248 BILIRUBIN DIRECT: CPT

## 2024-11-08 PROCEDURE — 84155 ASSAY OF PROTEIN SERUM: CPT

## 2024-11-08 PROCEDURE — 85025 COMPLETE CBC W/AUTO DIFF WBC: CPT

## 2024-11-08 PROCEDURE — 36415 COLL VENOUS BLD VENIPUNCTURE: CPT

## 2024-11-08 RX ORDER — MEPERIDINE HYDROCHLORIDE 25 MG/ML
25 INJECTION INTRAMUSCULAR; INTRAVENOUS; SUBCUTANEOUS
OUTPATIENT
Start: 2024-12-13

## 2024-11-08 RX ORDER — HEPARIN SODIUM (PORCINE) LOCK FLUSH IV SOLN 100 UNIT/ML 100 UNIT/ML
5 SOLUTION INTRAVENOUS
OUTPATIENT
Start: 2024-12-13

## 2024-11-08 RX ORDER — DIPHENHYDRAMINE HYDROCHLORIDE 50 MG/ML
50 INJECTION INTRAMUSCULAR; INTRAVENOUS
Start: 2024-12-13

## 2024-11-08 RX ORDER — ALBUTEROL SULFATE 90 UG/1
1-2 INHALANT RESPIRATORY (INHALATION)
Start: 2024-12-13

## 2024-11-08 RX ORDER — EPINEPHRINE 1 MG/ML
0.3 INJECTION, SOLUTION INTRAMUSCULAR; SUBCUTANEOUS EVERY 5 MIN PRN
OUTPATIENT
Start: 2024-12-13

## 2024-11-08 RX ORDER — METHYLPREDNISOLONE SODIUM SUCCINATE 40 MG/ML
40 INJECTION INTRAMUSCULAR; INTRAVENOUS
Start: 2024-12-13

## 2024-11-08 RX ORDER — DIPHENHYDRAMINE HYDROCHLORIDE 50 MG/ML
25 INJECTION INTRAMUSCULAR; INTRAVENOUS
Start: 2024-12-13

## 2024-11-08 RX ORDER — ALBUTEROL SULFATE 0.83 MG/ML
2.5 SOLUTION RESPIRATORY (INHALATION)
OUTPATIENT
Start: 2024-12-13

## 2024-11-08 RX ORDER — HEPARIN SODIUM,PORCINE 10 UNIT/ML
5-20 VIAL (ML) INTRAVENOUS DAILY PRN
OUTPATIENT
Start: 2024-12-13

## 2024-11-08 RX ADMIN — VEDOLIZUMAB 300 MG: 300 INJECTION, POWDER, LYOPHILIZED, FOR SOLUTION INTRAVENOUS at 08:54

## 2024-11-08 ASSESSMENT — PAIN SCALES - GENERAL: PAINLEVEL_OUTOF10: SEVERE PAIN (6)

## 2024-11-08 NOTE — PROGRESS NOTES
Infusion Nursing Note:  Onel White presents today for entyvio.    Patient seen by provider today: No   present during visit today: Not Applicable.    Note: Onel states he is a week late for his dose of entyvio so he has noticed more loose stools and intermittent blood in stools over the last week. Otherwise, no changes healthwise.      Intravenous Access:  Peripheral IV placed.    Treatment Conditions:  Biological Infusion Checklist:  ~~~ NOTE: If the patient answers yes to any of the questions below, hold the infusion and contact ordering provider or on-call provider.    Have you recently had an elevated temperature, fever, chills, productive cough, coughing for 3 weeks or longer or hemoptysis,  abnormal vital signs, night sweats,  chest pain or have you noticed a decrease in your appetite, unexplained weight loss or fatigue? No  Do you have any open wounds or new incisions? No  Do you have any upcoming hospitalizations or surgeries? Does not include esophagogastroduodenoscopy, colonoscopy, endoscopic retrograde cholangiopancreatography (ERCP), endoscopic ultrasound (EUS), dental procedures or joint aspiration/steroid injections No  Do you currently have any signs of illness or infection or are you on any antibiotics? No  Have you had any new, sudden or worsening abdominal pain? No  Have you or anyone in your household received a live vaccination in the past 4 weeks? Please note: No live vaccines while on biologic/chemotherapy until 6 months after the last treatment. Patient can receive the flu vaccine (shot only), pneumovax and the Covid vaccine. It is optimal for the patient to get these vaccines mid cycle, but they can be given at any time as long as it is not on the day of the infusion. No  Have you recently been diagnosed with any new nervous system diseases (ie. Multiple sclerosis, Guillain [place], seizures, neurological changes) or cancer diagnosis? Are you on any form of radiation or  chemotherapy? No  Are you pregnant or breast feeding or do you have plans of pregnancy in the future? N/A  Have you been having any signs of worsening depression or suicidal ideations?  (benlysta only) N/A  Have there been any other new onset medical symptoms? No  Have you had any new blood clots? (IVIG only) N/A      Post Infusion Assessment:  Patient tolerated infusion without incident.  Blood return noted pre and post infusion.  Site patent and intact, free from redness, edema or discomfort.  No evidence of extravasations.  Access discontinued per protocol.       Discharge Plan:   Patient discharged in stable condition accompanied by: self.  Departure Mode: Ambulatory.  Reviewed next infusion appointment on 12/27/24.      Saundra Woods RN

## 2024-11-12 NOTE — RESULT ENCOUNTER NOTE
Binu Sykes,    Your recent set of labs are available for you to review.  Liver enzymes are up a bit again, similar to previous.  With your recent MRCP showing stable findings, I am not sure of the clinical significance of this.  I think it would be good for us to meet for a visit to discuss further.  One of my nurses will call to help schedule a follow-up appointment with me.    Please let me know if you have any questions.    Sincerely,  Thomas PATEL [facility] GI, Hepatology, and Nutrition

## 2024-11-13 ENCOUNTER — TELEPHONE (OUTPATIENT)
Dept: GASTROENTEROLOGY | Facility: CLINIC | Age: 34
End: 2024-11-13
Payer: COMMERCIAL

## 2024-11-13 NOTE — TELEPHONE ENCOUNTER
11/13 Called patient (1st attempt) left voicemail and provided [phone] for patient to call back and schedule follow up visit with Thomas Alvarez PA-C in the next 1-2 months,.       [facility]   Medical and Surgical Specialties   [facility]

## 2024-11-13 NOTE — TELEPHONE ENCOUNTER
----- Message from Yasmine ALVAREZ sent at 11/12/2024  4:35 PM CST -----  Regarding: FW: Needs appointment  Hello,   Please see providers request:Can we call this patient to get them scheduled with follow-up with me in the next month or 2? Thomas Alvarez PA-C.  Thanks,   Sarahy  ----- Message -----  From: Thomas Alvarez PA-C  Sent: 11/12/2024  12:30 PM CST  To: [facility]  Subject: Needs appointment                                Hello,    Can we call this patient to get them scheduled with follow-up with me in the next month or 2?    Thanks,    Thomas Alvarez PA-C

## 2024-11-13 NOTE — TELEPHONE ENCOUNTER
Attempted to reach via phone to offer sooner visit with Thomas Alvarez PA-C.   No answer left call back number.

## 2025-02-03 ENCOUNTER — TELEPHONE (OUTPATIENT)
Dept: GASTROENTEROLOGY | Facility: CLINIC | Age: 35
End: 2025-02-03
Payer: COMMERCIAL

## 2025-02-03 DIAGNOSIS — K51.011 ULCERATIVE PANCOLITIS WITH RECTAL BLEEDING (H): Primary | ICD-10-CM

## 2025-02-03 RX ORDER — DIPHENHYDRAMINE HYDROCHLORIDE 50 MG/ML
50 INJECTION INTRAMUSCULAR; INTRAVENOUS
Status: CANCELLED
Start: 2025-02-03

## 2025-02-03 RX ORDER — METHYLPREDNISOLONE SODIUM SUCCINATE 40 MG/ML
40 INJECTION INTRAMUSCULAR; INTRAVENOUS
Status: CANCELLED
Start: 2025-02-03

## 2025-02-03 RX ORDER — DIPHENHYDRAMINE HYDROCHLORIDE 50 MG/ML
25 INJECTION INTRAMUSCULAR; INTRAVENOUS
Status: CANCELLED
Start: 2025-02-03

## 2025-02-03 RX ORDER — HEPARIN SODIUM,PORCINE 10 UNIT/ML
5-20 VIAL (ML) INTRAVENOUS DAILY PRN
Status: CANCELLED | OUTPATIENT
Start: 2025-02-03

## 2025-02-03 RX ORDER — ALBUTEROL SULFATE 0.83 MG/ML
2.5 SOLUTION RESPIRATORY (INHALATION)
Status: CANCELLED | OUTPATIENT
Start: 2025-02-03

## 2025-02-03 RX ORDER — EPINEPHRINE 1 MG/ML
0.3 INJECTION, SOLUTION, CONCENTRATE INTRAVENOUS EVERY 5 MIN PRN
Status: CANCELLED | OUTPATIENT
Start: 2025-02-03

## 2025-02-03 RX ORDER — HEPARIN SODIUM (PORCINE) LOCK FLUSH IV SOLN 100 UNIT/ML 100 UNIT/ML
5 SOLUTION INTRAVENOUS
Status: CANCELLED | OUTPATIENT
Start: 2025-02-03

## 2025-02-03 RX ORDER — ALBUTEROL SULFATE 90 UG/1
1-2 INHALANT RESPIRATORY (INHALATION)
Status: CANCELLED
Start: 2025-02-03

## 2025-02-03 NOTE — TELEPHONE ENCOUNTER
----- Message from Anne-Marie CASTREJON sent at 2/3/2025  8:10 AM CST -----  Regarding: Need signed orders  Onel Schmid has an entvyio infusion scheduled for 2/7. We need his orders signed, please.    Thanks  Anne-Marie Kolb [facility] Infusion Pharm[facility]

## 2025-02-03 NOTE — TELEPHONE ENCOUNTER
RV:  25 Thomas Alvarez  LV: anna Moncho    TB:  7/10/23 negative    Renew Therapy Plan:  Entyvio 300mg Q6wks  Reorder Standing Labs: CBC, CRP, Hep pan  Order TB:   TB Quant  Discontinue  Therapy Plan: Discontinued    Last dose: 24  Next dose: 25    Patient therapy plan updated due to plan expiration. Patient continues on medication per last clinic encounter. Standing lab orders also updated in the patient chart.

## 2025-02-06 ENCOUNTER — INFUSION THERAPY VISIT (OUTPATIENT)
Dept: INFUSION THERAPY | Facility: CLINIC | Age: 35
End: 2025-02-06
Attending: PHYSICIAN ASSISTANT
Payer: COMMERCIAL

## 2025-02-06 VITALS
HEART RATE: 71 BPM | SYSTOLIC BLOOD PRESSURE: 136 MMHG | RESPIRATION RATE: 16 BRPM | TEMPERATURE: 98.3 F | OXYGEN SATURATION: 98 % | BODY MASS INDEX: 25.26 KG/M2 | DIASTOLIC BLOOD PRESSURE: 83 MMHG | WEIGHT: 202.1 LBS

## 2025-02-06 DIAGNOSIS — K51.011 ULCERATIVE PANCOLITIS WITH RECTAL BLEEDING (H): Primary | ICD-10-CM

## 2025-02-06 DIAGNOSIS — K51.011 ULCERATIVE PANCOLITIS WITH RECTAL BLEEDING (H): ICD-10-CM

## 2025-02-06 LAB
ALBUMIN SERPL BCG-MCNC: 4.3 G/DL (ref 3.5–5.2)
ALP SERPL-CCNC: 106 U/L (ref 40–150)
ALT SERPL W P-5'-P-CCNC: 73 U/L (ref 0–70)
AST SERPL W P-5'-P-CCNC: 55 U/L (ref 0–45)
BASOPHILS # BLD AUTO: 0 10E3/UL (ref 0–0.2)
BASOPHILS NFR BLD AUTO: 0 %
BILIRUB DIRECT SERPL-MCNC: <0.2 MG/DL (ref 0–0.3)
BILIRUB SERPL-MCNC: 0.2 MG/DL
CRP SERPL-MCNC: <3 MG/L
EOSINOPHIL # BLD AUTO: 0.5 10E3/UL (ref 0–0.7)
EOSINOPHIL NFR BLD AUTO: 9 %
ERYTHROCYTE [DISTWIDTH] IN BLOOD BY AUTOMATED COUNT: 11.8 % (ref 10–15)
HCT VFR BLD AUTO: 45.7 % (ref 40–53)
HGB BLD-MCNC: 15.5 G/DL (ref 13.3–17.7)
IMM GRANULOCYTES # BLD: 0 10E3/UL
IMM GRANULOCYTES NFR BLD: 0 %
LYMPHOCYTES # BLD AUTO: 1.2 10E3/UL (ref 0.8–5.3)
LYMPHOCYTES NFR BLD AUTO: 23 %
MCH RBC QN AUTO: 30.8 PG (ref 26.5–33)
MCHC RBC AUTO-ENTMCNC: 33.9 G/DL (ref 31.5–36.5)
MCV RBC AUTO: 91 FL (ref 78–100)
MONOCYTES # BLD AUTO: 0.5 10E3/UL (ref 0–1.3)
MONOCYTES NFR BLD AUTO: 9 %
NEUTROPHILS # BLD AUTO: 3 10E3/UL (ref 1.6–8.3)
NEUTROPHILS NFR BLD AUTO: 58 %
NRBC # BLD AUTO: 0 10E3/UL
NRBC BLD AUTO-RTO: 0 /100
PLATELET # BLD AUTO: 211 10E3/UL (ref 150–450)
PROT SERPL-MCNC: 7.2 G/DL (ref 6.4–8.3)
RBC # BLD AUTO: 5.03 10E6/UL (ref 4.4–5.9)
WBC # BLD AUTO: 5.1 10E3/UL (ref 4–11)

## 2025-02-06 PROCEDURE — 82040 ASSAY OF SERUM ALBUMIN: CPT

## 2025-02-06 PROCEDURE — 258N000003 HC RX IP 258 OP 636: Performed by: INTERNAL MEDICINE

## 2025-02-06 PROCEDURE — 86140 C-REACTIVE PROTEIN: CPT

## 2025-02-06 PROCEDURE — 85004 AUTOMATED DIFF WBC COUNT: CPT

## 2025-02-06 PROCEDURE — 36415 COLL VENOUS BLD VENIPUNCTURE: CPT

## 2025-02-06 RX ORDER — HEPARIN SODIUM (PORCINE) LOCK FLUSH IV SOLN 100 UNIT/ML 100 UNIT/ML
5 SOLUTION INTRAVENOUS
OUTPATIENT
Start: 2025-03-20

## 2025-02-06 RX ORDER — ALBUTEROL SULFATE 0.83 MG/ML
2.5 SOLUTION RESPIRATORY (INHALATION)
OUTPATIENT
Start: 2025-03-20

## 2025-02-06 RX ORDER — ALBUTEROL SULFATE 90 UG/1
1-2 INHALANT RESPIRATORY (INHALATION)
Start: 2025-03-20

## 2025-02-06 RX ORDER — HEPARIN SODIUM,PORCINE 10 UNIT/ML
5-20 VIAL (ML) INTRAVENOUS DAILY PRN
OUTPATIENT
Start: 2025-03-20

## 2025-02-06 RX ORDER — DIPHENHYDRAMINE HYDROCHLORIDE 50 MG/ML
50 INJECTION INTRAMUSCULAR; INTRAVENOUS
Start: 2025-03-20

## 2025-02-06 RX ORDER — EPINEPHRINE 1 MG/ML
0.3 INJECTION, SOLUTION INTRAMUSCULAR; SUBCUTANEOUS EVERY 5 MIN PRN
OUTPATIENT
Start: 2025-03-20

## 2025-02-06 RX ORDER — METHYLPREDNISOLONE SODIUM SUCCINATE 40 MG/ML
40 INJECTION INTRAMUSCULAR; INTRAVENOUS
Start: 2025-03-20

## 2025-02-06 RX ORDER — DIPHENHYDRAMINE HYDROCHLORIDE 50 MG/ML
25 INJECTION INTRAMUSCULAR; INTRAVENOUS
Start: 2025-03-20

## 2025-02-06 RX ADMIN — SODIUM CHLORIDE 300 MG: 9 INJECTION, SOLUTION INTRAVENOUS at 09:45

## 2025-02-06 ASSESSMENT — PAIN SCALES - GENERAL: PAINLEVEL_OUTOF10: NO PAIN (0)

## 2025-02-06 NOTE — PROGRESS NOTES
"Infusion Nursing Note:  Onel White presents today for Entyvio.    Patient seen by provider today: No   present during visit today: Not Applicable.    Note: The patient reports feeling well and at his baseline. He notes dealing with chronic hemorrhoids and states they intermittently bleed. He does not apply and creams or use any interventions to help - he notes he \"just deals with them.\"     Intravenous Access:  Peripheral IV placed.    Treatment Conditions:  Biological Infusion Checklist:  ~~~ NOTE: If the patient answers yes to any of the questions below, hold the infusion and contact ordering provider or on-call provider.    Have you recently had an elevated temperature, fever, chills, productive cough, coughing for 3 weeks or longer or hemoptysis,  abnormal vital signs, night sweats,  chest pain or have you noticed a decrease in your appetite, unexplained weight loss or fatigue? No  Do you have any open wounds or new incisions? No  Do you have any upcoming hospitalizations or surgeries? Does not include esophagogastroduodenoscopy, colonoscopy, endoscopic retrograde cholangiopancreatography (ERCP), endoscopic ultrasound (EUS), dental procedures or joint aspiration/steroid injections No  Do you currently have any signs of illness or infection or are you on any antibiotics? No  Have you had any new, sudden or worsening abdominal pain? No  Have you or anyone in your household received a live vaccination in the past 4 weeks? Please note: No live vaccines while on biologic/chemotherapy until 6 months after the last treatment. Patient can receive the flu vaccine (shot only), pneumovax and the Covid vaccine. It is optimal for the patient to get these vaccines mid cycle, but they can be given at any time as long as it is not on the day of the infusion. No  Have you recently been diagnosed with any new nervous system diseases (ie. Multiple sclerosis, Guillain [place], seizures, neurological changes) or cancer " diagnosis? Are you on any form of radiation or chemotherapy? No  Are you pregnant or breast feeding or do you have plans of pregnancy in the future? N/A  Have you been having any signs of worsening depression or suicidal ideations?  (benlysta only) N/A  Have there been any other new onset medical symptoms? No  Have you had any new blood clots? (IVIG only) N/A      Post Infusion Assessment:  Patient tolerated infusion without incident.  Blood return noted pre and post infusion.  Site patent and intact, free from redness, edema or discomfort.  No evidence of extravasations.  Access discontinued per protocol.  Biologic Infusion Post Education: Call the triage nurse at your clinic or seek medical attention if you have chills and/or temperature greater than or equal to 100.5, uncontrolled nausea/vomiting, diarrhea, constipation, dizziness, shortness of breath, chest pain, heart palpitations, weakness or any other new or concerning symptoms, questions or concerns.  You cannot have any live virus vaccines prior to or during treatment or up to 6 months post infusion.  If you have an upcoming surgery, medical procedure or dental procedure during treatment, this should be discussed with your ordering physician and your surgeon/dentist.  If you are having any concerning symptom, if you are unsure if you should get your next infusion or wish to speak to a provider before your next infusion, please call your care coordinator or triage nurse at your clinic to notify them so we can adequately serve you.       Discharge Plan:   AVS to patient via [employer].  Patient will return 3/21/25 for next appointment. Future appts have been reviewed and crosschecked with appt note and plan.   Patient discharged in stable condition accompanied by: self.  Departure Mode: Ambulatory.      Ashtyn Sosa RN

## 2025-02-25 ENCOUNTER — VIRTUAL VISIT (OUTPATIENT)
Dept: GASTROENTEROLOGY | Facility: CLINIC | Age: 35
End: 2025-02-25
Payer: COMMERCIAL

## 2025-02-25 DIAGNOSIS — F12.20 CANNABIS USE DISORDER, SEVERE, DEPENDENCE (H): ICD-10-CM

## 2025-02-25 DIAGNOSIS — K51.011 ULCERATIVE PANCOLITIS WITH RECTAL BLEEDING (H): Primary | ICD-10-CM

## 2025-02-25 DIAGNOSIS — F10.288 ALCOHOL DEPENDENCE WITH OTHER ALCOHOL-INDUCED DISORDER (H): ICD-10-CM

## 2025-02-25 PROCEDURE — 98014 SYNCH AUDIO-ONLY EST MOD 30: CPT | Performed by: PHYSICIAN ASSISTANT

## 2025-02-25 PROCEDURE — 1126F AMNT PAIN NOTED NONE PRSNT: CPT | Mod: 93 | Performed by: PHYSICIAN ASSISTANT

## 2025-02-25 NOTE — NURSING NOTE
[place] [address] [place] [address]    Is the patient currently in the state of [place]? YES    Visit mode: TELEPHONE    If the visit is dropped, the patient can be reconnected by:TELEPHONE VISIT: Phone number:   Telephone Information:   Mobile [phone]   Mobile Not on file.       Will anyone else be joining the visit? NO  (If patient encounters technical issues they should call [phone]    Are changes needed to the allergy or medication list? No    Are refills needed on medications prescribed by this physician? NO    Rooming Documentation:  Questionnaire(s) completed    Reason for visit: RECHECK    Dann BUCK

## 2025-02-25 NOTE — PROGRESS NOTES
GASTROENTEROLOGY NEW PATIENT TELEPHONE VISIT    CC/REFERRING MD:    No Ref-Primary, Physician  Ar Paez    REASON FOR CONSULTATION:   Ar Paez for   Chief Complaint   Patient presents with    RECHECK       HISTORY OF PRESENT ILLNESS:    Onel White is a 34 year old male who is being evaluated via a billable telephone visit for follow-up of ulcerative pancolitis.  This is my first visit with patient, he previously was managed by Abel Ivan PA-C and Dr. Hancock.  His last visit with us was about 13 months ago.  He is currently treated with Entyvio infusions every 6 weeks.  This was increased from every 8 weeks last year after he reported some increased symptomology close to his infusion.  Since then, he is typically having 1 BM daily in the morning, soft but formed.  Occasionally he will have looser stool.  He typically does see a small amount of hematochezia with each stool.  He is not having any abdominal pain, bloating, cramping, or rectal pain, nor is there any urgency to his stools.  We followed his labs over the summer and he did have some mild increase in AST and ALT.  Given his previous history of prominent intrahepatic bile ducts, we repeated MRCP and this showed these areas to be stable.  In retrospect, he feels that it was likely elevated due to increased alcohol use.  He does have a longstanding history of heavy alcohol use, has had history of DUI and previous treatment.  Currently, he has been out of work for a month and this typically leads to more persistent alcohol use.  He is not interested in pursuing any specific treatment at this time, stating that he knows that he can quit at any time and it does not seem to be affecting his life significantly.    His last colonoscopy was in September 2023, notable for scarred mucosa in the entire examined colon, inactive Ortega score 0.  Biopsies noting minimally active chronic colitis, Gwen grade 1, also notable for 5 mm cecal polyp and 20 mm  adenomatous transverse colon polyp.    IBD History   Age at diagnosis:  Dx 2008  Extent of disease:  Pancolitis  Current UC medications:  Vedolizumab q6wk  Prior UC surgeries:  none  Prior IBD Medications: Sulfasalazine, steroids, Lialda        DRUG MONITORING  TPMT enzyme activity:   Phenotype normal, level 33     6-TGN/6-MMPN levels:  none     Biologic concentration:  none     PMHx, PSHx, Social Hx, Family Hx have been reviewed.      ENDOSCOPY   Colonoscopy September 2023: Scarred mucosa noted in the entire examined colon.  Inactive Ortega score 0 ulcerative colitis.  Biopsies noted minimally active chronic colitis. 5 mm cecal polyp and 20 mm adenomatous transverse colon polyp also noted.      Enterography: none     Fecal calprotectin: June 2020 was 1060     C diff: none    Liver evaluation: prominent intrahepatic biliary ducts greatest in the left hepatic lobe, no findings strongly suggestive of PSC and hepatomegaly. Liver biopsy showed focal periductal fibrosis of the large ducts.  There is not any significant ductopenia. The biopsies are subtle for early stage PSC. Updated MRCP in August 2024 - IMPRESSION:   1. Stable mild prominence of a few intrahepatic bile ducts in the left  hepatic lobe.  2. Borderline hepatomegaly. No evidence of hepatic parenchymal  disease. No focal hepatic lesion.      I have reviewed and updated the patient's Past Medical History, Social History, Family History and Medication List.    Exam:    No physical exam was performed as this was an audio-only visit.      PERTINENT STUDIES have been reviewed.    ASSESSMENT/PLAN:    Onel White is a 34 year old male who presents for evaluation of ulcerative pancolitis.  Since his last visit with us, symptoms are somewhat better, typically having formed stool, but still having hematochezia with most BMs.  This may reflect possibly ongoing inflammation, though persistent alcohol use and possibility of localized source like hemorrhoid or fissure is  possible as well.  He is due to update colonoscopy, this will be arranged.  Will continue on Entyvio every 6 weeks for now.  We did briefly discuss his alcohol use.  I did encourage him to work on cutting back and even abstaining as there is certainly risks to his health with ongoing heavy use.  He declines assistance with treatment at this time.    1. Ulcerative pancolitis with rectal bleeding (H) (Primary)  - Adult GI  Referral - Procedure Only; Future    2. Alcohol dependence with other alcohol-induced disorder (H)    3. Cannabis use disorder, severe, dependence (H)        Telephone-Visit Details    Telephone Visit Time: 10 minutes    Type of service:  Telephone Visit    Originating Location (pt. Location): Home    Distant Location (provider location):  On-site    Platform used for Video Visit: ERMIAS Alvarez PA-C    RTC 6 months    Thank you for this consultation.  It was a pleasure to participate in the care of this patient; please contact us with any further questions.  A total of 25 minutes was spent with reviewing the chart, discussing with the patient, documentation and coordination of care.    This note was created with voice recognition software, and while reviewed for accuracy, typos may remain.     Thomas Alvarez PA-C  Division of Gastroenterology, Hepatology and Nutrition  [facility]  [phone]

## 2025-02-26 ENCOUNTER — TELEPHONE (OUTPATIENT)
Dept: GASTROENTEROLOGY | Facility: CLINIC | Age: 35
End: 2025-02-26
Payer: COMMERCIAL

## 2025-02-26 NOTE — TELEPHONE ENCOUNTER
"Endoscopy Scheduling Screen    Have you had any respiratory illness or flu-like symptoms in the last 10 days?  No    What is your communication preference for Instructions and/or Bowel Prep?   MyChart    What insurance is in the chart?  Other:  BC[employer]    Ordering/Referring Provider: BORIS MORA   (If ordering provider performs procedure, schedule with ordering provider unless otherwise instructed. )    BMI: Estimated body mass index is 25.26 kg/m  as calculated from the following:    Height as of 6/21/24: 1.905 m (6' 3\").    Weight as of 2/6/25: 91.7 kg (202 lb 1.6 oz).     Sedation Ordered  MAC/deep sedation.   BMI<= 45 45 < BMI <= 48 48 < BMI < = 50  BMI > 50   No Restrictions No [facility]  No [facility]  [facility] with exceptions Hospital Only OR Only       Do you have a history of malignant hyperthermia?  No    (Females) Are you currently pregnant?   No     Have you been diagnosed or told you have pulmonary hypertension?   No    Do you have an LVAD?  No    Have you been told you have moderate to severe sleep apnea?  No.    Have you been told you have COPD, asthma, or any other lung disease?  Yes     What breathing problems do you have?  Asthma     Do you use home oxygen?  No    Have your breathing problems required an ED visit or hospitalization in the last year?  No.    Do you  have a history of any heart conditions or any upcoming cardiac exams like an echo, angiogram, stress test, or ablation?  No     Have you ever had or are you waiting for an organ transplant?  No. Continue scheduling, no site restrictions.    Have you had a stroke or transient ischemic attack (TIA aka \"mini stroke\") in the last 2 years?   No.    Have you been diagnosed with or been told you have cirrhosis of the liver?   No.    Are you currently on dialysis?   No    Do you need assistance transferring?   No    BMI: Estimated body mass index is 25.26 kg/m  as calculated from the following:    Height as of 6/21/24: 1.905 m (6' 3\").    Weight as of " 2/6/25: 91.7 kg (202 lb 1.6 oz).     Is patients BMI > 40 and scheduling location [facility]?  No    Do you take an injectable or oral medication for weight loss or diabetes (excluding insulin)?  No    Do you take the medication Naltrexone?  No    Do you take blood thinners?  No       Prep   Are you currently on dialysis or do you have chronic kidney disease?  No    Do you have a diagnosis of diabetes?  No    Do you have a diagnosis of cystic fibrosis (CF)?  No    On a regular basis do you go 3 -5 days between bowel movements?  No    BMI > 40?  No    Preferred Pharmacy:    [facility] - [place], [place] - [address]  [address]  [facility] [address]  Phone: [phone] Fax: [phone]    Final Scheduling Details     Procedure scheduled  Colonoscopy    Surgeon:  SUSANNAH     Date of procedure:       Pre-OP / PAC:   No - Not required for this site.    Location  [facility] - ASC - Per order.    Sedation   MAC/Deep Sedation - Per order.      Patient Reminders:   You will receive a call from a Nurse to review instructions and health history.  This assessment must be completed prior to your procedure.  Failure to complete the Nurse assessment may result in the procedure being cancelled.      On the day of your procedure, please designate an adult(s) who can drive you home stay with you for the next 24 hours. The medicines used in the exam will make you sleepy. You will not be able to drive.      You cannot take public transportation, ride share services, or non-medical taxi service without a responsible caregiver.  Medical transport services are allowed with the requirement that a responsible caregiver will receive you at your destination.  We require that drivers and caregivers are confirmed prior to your procedure.

## 2025-03-22 ENCOUNTER — HEALTH MAINTENANCE LETTER (OUTPATIENT)
Age: 35
End: 2025-03-22

## 2025-04-04 ENCOUNTER — APPOINTMENT (OUTPATIENT)
Dept: OCCUPATIONAL MEDICINE | Facility: OTHER | Age: 35
End: 2025-04-04

## 2025-04-04 PROCEDURE — 99080 SPECIAL REPORTS OR FORMS: CPT

## 2025-04-04 PROCEDURE — 99499 UNLISTED E&M SERVICE: CPT

## 2025-04-04 PROCEDURE — 94010 BREATHING CAPACITY TEST: CPT

## 2025-04-04 PROCEDURE — 99199 UNLISTED SPECIAL SVC PX/RPRT: CPT

## 2025-04-29 ENCOUNTER — TELEPHONE (OUTPATIENT)
Dept: GASTROENTEROLOGY | Facility: CLINIC | Age: 35
End: 2025-04-29
Payer: COMMERCIAL

## 2025-04-29 NOTE — TELEPHONE ENCOUNTER
Pre assessment completed for upcoming procedure.   (Please see previous telephone encounter notes for complete details)    Procedure details:    Arrival time and facility location reviewed.    Pre op exam needed? No.    Designated  policy reviewed and that site requests drivers to check in and stay on campus. Instructed to have someone stay 24  hours post procedure.     Patient advised to call back if patient's ride cannot stay on campus.      Medication review:    Medications reviewed. Please see supporting documentation below. Holding recommendations discussed (if applicable).   Patient denies GLP-1, iron or fiber  Cannabis: Stop the night before the procedure    Prep for procedure:     Procedure prep instructions reviewed.    Standard Miralax  Reminded NPO 2 hours before arrival. NPO at 1000 morning of procedure.    Any additional information needed:  N/A      Patient verbalized understanding and had no questions or concerns at this time.      Shante Alvarado RN  Endoscopy Procedure Pre Assessment   [phone] option 3        --------------------------------------------------------------------------------------------------------------------    Pre visit planning completed.      Procedure details:    Patient scheduled for Colonoscopy on 5/15/25.     Arrival time: 1215. Procedure time 1345    Facility location: [facility]; [address] [place] [address], [address] Floor, [place] [address]. Check in location: Main entrance at registration desk.  *Disclaimer: Drivers are to check in with patient and stay on campus during procedure.     Sedation type: MAC    Pre op exam needed? No.    Indication for procedure: Follow up ulcerative colitis      Chart review:     Electronic implanted devices? No    Recent diagnosis of diverticulitis within the last 6 weeks? No      Medication review:    Diabetic? No    Anticoagulants? No    Weight loss medication/injectable? No GLP-1 medication per patient's medication  list. Nursing to verify with pre-assessment call.    Other medication HOLDING recommendations:  Cannabis/Marijuana: Stop night before procedure.      Prep for procedure:     Bowel prep recommendation: Standard Miralax.   Due to: standard bowel prep    Procedure information and instructions sent via yaz Alvarado RN  Endoscopy Procedure Pre Assessment   [phone] option 3

## 2025-05-05 ENCOUNTER — LAB (OUTPATIENT)
Dept: INFUSION THERAPY | Facility: CLINIC | Age: 35
End: 2025-05-05
Attending: PHYSICIAN ASSISTANT
Payer: COMMERCIAL

## 2025-05-05 ENCOUNTER — INFUSION THERAPY VISIT (OUTPATIENT)
Dept: INFUSION THERAPY | Facility: CLINIC | Age: 35
End: 2025-05-05
Attending: INTERNAL MEDICINE
Payer: COMMERCIAL

## 2025-05-05 VITALS
OXYGEN SATURATION: 98 % | RESPIRATION RATE: 16 BRPM | TEMPERATURE: 98 F | WEIGHT: 217 LBS | BODY MASS INDEX: 27.12 KG/M2 | HEART RATE: 101 BPM | SYSTOLIC BLOOD PRESSURE: 127 MMHG | DIASTOLIC BLOOD PRESSURE: 79 MMHG

## 2025-05-05 DIAGNOSIS — R21 RASH: ICD-10-CM

## 2025-05-05 DIAGNOSIS — K51.011 ULCERATIVE PANCOLITIS WITH RECTAL BLEEDING (H): ICD-10-CM

## 2025-05-05 DIAGNOSIS — K51.011 ULCERATIVE PANCOLITIS WITH RECTAL BLEEDING (H): Primary | ICD-10-CM

## 2025-05-05 LAB
ALBUMIN SERPL BCG-MCNC: 4.8 G/DL (ref 3.5–5.2)
ALP SERPL-CCNC: 112 U/L (ref 40–150)
ALT SERPL W P-5'-P-CCNC: 43 U/L (ref 0–70)
AST SERPL W P-5'-P-CCNC: 39 U/L (ref 0–45)
BASOPHILS # BLD AUTO: 0 10E3/UL (ref 0–0.2)
BASOPHILS NFR BLD AUTO: 1 %
BILIRUB DIRECT SERPL-MCNC: 0.23 MG/DL (ref 0–0.3)
BILIRUB SERPL-MCNC: 0.5 MG/DL
CRP SERPL-MCNC: <3 MG/L
EOSINOPHIL # BLD AUTO: 0.4 10E3/UL (ref 0–0.7)
EOSINOPHIL NFR BLD AUTO: 7 %
ERYTHROCYTE [DISTWIDTH] IN BLOOD BY AUTOMATED COUNT: 12.4 % (ref 10–15)
HCT VFR BLD AUTO: 43.6 % (ref 40–53)
HGB BLD-MCNC: 15 G/DL (ref 13.3–17.7)
HOLD SPECIMEN: NORMAL
IMM GRANULOCYTES # BLD: 0 10E3/UL
IMM GRANULOCYTES NFR BLD: 0 %
KOH PREPARATION: NORMAL
KOH PREPARATION: NORMAL
LYMPHOCYTES # BLD AUTO: 1.2 10E3/UL (ref 0.8–5.3)
LYMPHOCYTES NFR BLD AUTO: 18 %
MCH RBC QN AUTO: 31.3 PG (ref 26.5–33)
MCHC RBC AUTO-ENTMCNC: 34.4 G/DL (ref 31.5–36.5)
MCV RBC AUTO: 91 FL (ref 78–100)
MONOCYTES # BLD AUTO: 0.6 10E3/UL (ref 0–1.3)
MONOCYTES NFR BLD AUTO: 9 %
NEUTROPHILS # BLD AUTO: 4.2 10E3/UL (ref 1.6–8.3)
NEUTROPHILS NFR BLD AUTO: 66 %
NRBC # BLD AUTO: 0 10E3/UL
NRBC BLD AUTO-RTO: 0 /100
PLATELET # BLD AUTO: 212 10E3/UL (ref 150–450)
PROT SERPL-MCNC: 7.7 G/DL (ref 6.4–8.3)
RBC # BLD AUTO: 4.79 10E6/UL (ref 4.4–5.9)
WBC # BLD AUTO: 6.5 10E3/UL (ref 4–11)

## 2025-05-05 PROCEDURE — 87220 TISSUE EXAM FOR FUNGI: CPT

## 2025-05-05 PROCEDURE — 85004 AUTOMATED DIFF WBC COUNT: CPT

## 2025-05-05 PROCEDURE — 84155 ASSAY OF PROTEIN SERUM: CPT

## 2025-05-05 PROCEDURE — 86140 C-REACTIVE PROTEIN: CPT

## 2025-05-05 PROCEDURE — 99207 PR NO CHARGE LOS: CPT

## 2025-05-05 PROCEDURE — 96365 THER/PROPH/DIAG IV INF INIT: CPT

## 2025-05-05 PROCEDURE — 250N000011 HC RX IP 250 OP 636: Mod: JZ | Performed by: INTERNAL MEDICINE

## 2025-05-05 PROCEDURE — 258N000003 HC RX IP 258 OP 636: Performed by: INTERNAL MEDICINE

## 2025-05-05 PROCEDURE — 36415 COLL VENOUS BLD VENIPUNCTURE: CPT

## 2025-05-05 RX ORDER — EPINEPHRINE 1 MG/ML
0.3 INJECTION, SOLUTION INTRAMUSCULAR; SUBCUTANEOUS EVERY 5 MIN PRN
OUTPATIENT
Start: 2025-06-13

## 2025-05-05 RX ORDER — DIPHENHYDRAMINE HYDROCHLORIDE 50 MG/ML
25 INJECTION, SOLUTION INTRAMUSCULAR; INTRAVENOUS
Start: 2025-06-13

## 2025-05-05 RX ORDER — ALBUTEROL SULFATE 90 UG/1
1-2 INHALANT RESPIRATORY (INHALATION)
Start: 2025-06-13

## 2025-05-05 RX ORDER — METHYLPREDNISOLONE SODIUM SUCCINATE 40 MG/ML
40 INJECTION INTRAMUSCULAR; INTRAVENOUS
Start: 2025-06-13

## 2025-05-05 RX ORDER — DIPHENHYDRAMINE HYDROCHLORIDE 50 MG/ML
50 INJECTION, SOLUTION INTRAMUSCULAR; INTRAVENOUS
Start: 2025-06-13

## 2025-05-05 RX ORDER — ALBUTEROL SULFATE 0.83 MG/ML
2.5 SOLUTION RESPIRATORY (INHALATION)
OUTPATIENT
Start: 2025-06-13

## 2025-05-05 RX ORDER — HEPARIN SODIUM,PORCINE 10 UNIT/ML
5-20 VIAL (ML) INTRAVENOUS DAILY PRN
OUTPATIENT
Start: 2025-06-13

## 2025-05-05 RX ORDER — HEPARIN SODIUM (PORCINE) LOCK FLUSH IV SOLN 100 UNIT/ML 100 UNIT/ML
5 SOLUTION INTRAVENOUS
OUTPATIENT
Start: 2025-06-13

## 2025-05-05 RX ADMIN — SODIUM CHLORIDE 250 ML: 0.9 INJECTION, SOLUTION INTRAVENOUS at 15:59

## 2025-05-05 RX ADMIN — VEDOLIZUMAB 300 MG: 300 INJECTION, POWDER, LYOPHILIZED, FOR SOLUTION INTRAVENOUS at 16:08

## 2025-05-05 NOTE — PROGRESS NOTES
Infusion Nursing Note:  Onel White presents today for Entyvio.    Patient seen by provider today: No   present during visit today: Not Applicable.    Note: Patient reports feeling well from an ulcerative colitis perspective. Does share that he has some eczema type spots on both the left and right side of this trunk.  Also has a 3 smaller red and irritated spots on his left arm and left chest with defined borders.  States he's been using lotromin on it with some relief.     Asked Kenia Trivedi NP to assess his rash and to provide some insight on other potential creams if warranted.     Intravenous Access:  Peripheral IV placed.    Treatment Conditions:  Biological Infusion Checklist:  ~~~ NOTE: If the patient answers yes to any of the questions below, hold the infusion and contact ordering provider or on-call provider.    Have you recently had an elevated temperature, fever, chills, productive cough, coughing for 3 weeks or longer or hemoptysis,  abnormal vital signs, night sweats,  chest pain or have you noticed a decrease in your appetite, unexplained weight loss or fatigue? No  Do you have any open wounds or new incisions? No  Do you have any upcoming hospitalizations or surgeries? Does not include esophagogastroduodenoscopy, colonoscopy, endoscopic retrograde cholangiopancreatography (ERCP), endoscopic ultrasound (EUS), dental procedures or joint aspiration/steroid injections No  Do you currently have any signs of illness or infection or are you on any antibiotics? No  Have you had any new, sudden or worsening abdominal pain? No  Have you or anyone in your household received a live vaccination in the past 4 weeks? Please note: No live vaccines while on biologic/chemotherapy until 6 months after the last treatment. Patient can receive the flu vaccine (shot only), pneumovax and the Covid vaccine. It is optimal for the patient to get these vaccines mid cycle, but they can be given at any time as  long as it is not on the day of the infusion. No  Have you recently been diagnosed with any new nervous system diseases (ie. Multiple sclerosis, Guillain [place], seizures, neurological changes) or cancer diagnosis? Are you on any form of radiation or chemotherapy? No  Are you pregnant or breast feeding or do you have plans of pregnancy in the future? No  Have you been having any signs of worsening depression or suicidal ideations?  (benlysta only) N/A  Have there been any other new onset medical symptoms? No  Have you had any new blood clots? (IVIG only) N/A      Post Infusion Assessment:  Patient tolerated infusion without incident.  Blood return noted pre and post infusion.  No evidence of extravasations.  Access discontinued per protocol.  Biologic Infusion Post Education: Call the triage nurse at your clinic or seek medical attention if you have chills and/or temperature greater than or equal to 100.5, uncontrolled nausea/vomiting, diarrhea, constipation, dizziness, shortness of breath, chest pain, heart palpitations, weakness or any other new or concerning symptoms, questions or concerns.  You cannot have any live virus vaccines prior to or during treatment or up to 6 months post infusion.  If you have an upcoming surgery, medical procedure or dental procedure during treatment, this should be discussed with your ordering physician and your surgeon/dentist.  If you are having any concerning symptom, if you are unsure if you should get your next infusion or wish to speak to a provider before your next infusion, please call your care coordinator or triage nurse at your clinic to notify them so we can adequately serve you.       Discharge Plan:   Discharge instructions reviewed with: Patient.  Patient and/or family verbalized understanding of discharge instructions and all questions answered.  Patient discharged in stable condition accompanied by: self.  Departure Mode: Ambulatory.      Kaykay Christianson RN

## 2025-05-08 ENCOUNTER — RESULTS FOLLOW-UP (OUTPATIENT)
Dept: MULTI SPECIALTY CLINIC | Facility: CLINIC | Age: 35
End: 2025-05-08

## 2025-05-15 ENCOUNTER — HOSPITAL ENCOUNTER (OUTPATIENT)
Facility: CLINIC | Age: 35
Discharge: HOME | End: 2025-05-15
Attending: INTERNAL MEDICINE | Admitting: INTERNAL MEDICINE
Payer: COMMERCIAL

## 2025-05-15 ENCOUNTER — ANESTHESIA EVENT (OUTPATIENT)
Dept: GASTROENTEROLOGY | Facility: CLINIC | Age: 35
End: 2025-05-15
Payer: COMMERCIAL

## 2025-05-15 ENCOUNTER — ANESTHESIA (OUTPATIENT)
Dept: GASTROENTEROLOGY | Facility: CLINIC | Age: 35
End: 2025-05-15
Payer: COMMERCIAL

## 2025-05-15 VITALS
DIASTOLIC BLOOD PRESSURE: 84 MMHG | SYSTOLIC BLOOD PRESSURE: 114 MMHG | OXYGEN SATURATION: 100 % | RESPIRATION RATE: 14 BRPM

## 2025-05-15 LAB — COLONOSCOPY: NORMAL

## 2025-05-15 PROCEDURE — 45380 COLONOSCOPY AND BIOPSY: CPT | Performed by: INTERNAL MEDICINE

## 2025-05-15 PROCEDURE — 250N000009 HC RX 250: Performed by: NURSE ANESTHETIST, CERTIFIED REGISTERED

## 2025-05-15 PROCEDURE — 88305 TISSUE EXAM BY PATHOLOGIST: CPT | Mod: TC | Performed by: INTERNAL MEDICINE

## 2025-05-15 PROCEDURE — 250N000011 HC RX IP 250 OP 636: Performed by: NURSE ANESTHETIST, CERTIFIED REGISTERED

## 2025-05-15 PROCEDURE — 45385 COLONOSCOPY W/LESION REMOVAL: CPT | Mod: PT

## 2025-05-15 PROCEDURE — 258N000003 HC RX IP 258 OP 636: Performed by: NURSE ANESTHETIST, CERTIFIED REGISTERED

## 2025-05-15 PROCEDURE — 370N000017 HC ANESTHESIA TECHNICAL FEE, PER MIN: Performed by: INTERNAL MEDICINE

## 2025-05-15 PROCEDURE — 88305 TISSUE EXAM BY PATHOLOGIST: CPT | Mod: 26 | Performed by: PATHOLOGY

## 2025-05-15 RX ORDER — ACETAMINOPHEN 325 MG/1
975 TABLET ORAL ONCE
Status: DISCONTINUED | OUTPATIENT
Start: 2025-05-15 | End: 2025-05-15 | Stop reason: HOSPADM

## 2025-05-15 RX ORDER — PROPOFOL 10 MG/ML
INJECTION, EMULSION INTRAVENOUS PRN
Status: DISCONTINUED | OUTPATIENT
Start: 2025-05-15 | End: 2025-05-15

## 2025-05-15 RX ORDER — ONDANSETRON 2 MG/ML
4 INJECTION INTRAMUSCULAR; INTRAVENOUS EVERY 30 MIN PRN
Status: DISCONTINUED | OUTPATIENT
Start: 2025-05-15 | End: 2025-05-15 | Stop reason: HOSPADM

## 2025-05-15 RX ORDER — PROPOFOL 10 MG/ML
INJECTION, EMULSION INTRAVENOUS CONTINUOUS PRN
Status: DISCONTINUED | OUTPATIENT
Start: 2025-05-15 | End: 2025-05-15

## 2025-05-15 RX ORDER — NALOXONE HYDROCHLORIDE 0.4 MG/ML
0.1 INJECTION, SOLUTION INTRAMUSCULAR; INTRAVENOUS; SUBCUTANEOUS
Status: DISCONTINUED | OUTPATIENT
Start: 2025-05-15 | End: 2025-05-15 | Stop reason: HOSPADM

## 2025-05-15 RX ORDER — NALOXONE HYDROCHLORIDE 0.4 MG/ML
0.4 INJECTION, SOLUTION INTRAMUSCULAR; INTRAVENOUS; SUBCUTANEOUS
Status: DISCONTINUED | OUTPATIENT
Start: 2025-05-15 | End: 2025-05-15 | Stop reason: HOSPADM

## 2025-05-15 RX ORDER — ONDANSETRON 2 MG/ML
4 INJECTION INTRAMUSCULAR; INTRAVENOUS EVERY 6 HOURS PRN
Status: DISCONTINUED | OUTPATIENT
Start: 2025-05-15 | End: 2025-05-15 | Stop reason: HOSPADM

## 2025-05-15 RX ORDER — ONDANSETRON 4 MG/1
4 TABLET, ORALLY DISINTEGRATING ORAL EVERY 30 MIN PRN
Status: DISCONTINUED | OUTPATIENT
Start: 2025-05-15 | End: 2025-05-15 | Stop reason: HOSPADM

## 2025-05-15 RX ORDER — NALOXONE HYDROCHLORIDE 0.4 MG/ML
0.2 INJECTION, SOLUTION INTRAMUSCULAR; INTRAVENOUS; SUBCUTANEOUS
Status: DISCONTINUED | OUTPATIENT
Start: 2025-05-15 | End: 2025-05-15 | Stop reason: HOSPADM

## 2025-05-15 RX ORDER — LIDOCAINE HYDROCHLORIDE 20 MG/ML
INJECTION, SOLUTION INFILTRATION; PERINEURAL PRN
Status: DISCONTINUED | OUTPATIENT
Start: 2025-05-15 | End: 2025-05-15

## 2025-05-15 RX ORDER — ONDANSETRON 2 MG/ML
4 INJECTION INTRAMUSCULAR; INTRAVENOUS
Status: DISCONTINUED | OUTPATIENT
Start: 2025-05-15 | End: 2025-05-15 | Stop reason: HOSPADM

## 2025-05-15 RX ORDER — SODIUM CHLORIDE, SODIUM LACTATE, POTASSIUM CHLORIDE, CALCIUM CHLORIDE 600; 310; 30; 20 MG/100ML; MG/100ML; MG/100ML; MG/100ML
INJECTION, SOLUTION INTRAVENOUS CONTINUOUS PRN
Status: DISCONTINUED | OUTPATIENT
Start: 2025-05-15 | End: 2025-05-15

## 2025-05-15 RX ORDER — ONDANSETRON 4 MG/1
4 TABLET, ORALLY DISINTEGRATING ORAL EVERY 6 HOURS PRN
Status: DISCONTINUED | OUTPATIENT
Start: 2025-05-15 | End: 2025-05-15 | Stop reason: HOSPADM

## 2025-05-15 RX ORDER — LIDOCAINE 40 MG/G
CREAM TOPICAL
Status: DISCONTINUED | OUTPATIENT
Start: 2025-05-15 | End: 2025-05-15 | Stop reason: HOSPADM

## 2025-05-15 RX ORDER — PROCHLORPERAZINE MALEATE 5 MG/1
10 TABLET ORAL EVERY 6 HOURS PRN
Status: DISCONTINUED | OUTPATIENT
Start: 2025-05-15 | End: 2025-05-15 | Stop reason: HOSPADM

## 2025-05-15 RX ORDER — DEXAMETHASONE SODIUM PHOSPHATE 4 MG/ML
4 INJECTION, SOLUTION INTRA-ARTICULAR; INTRALESIONAL; INTRAMUSCULAR; INTRAVENOUS; SOFT TISSUE
Status: DISCONTINUED | OUTPATIENT
Start: 2025-05-15 | End: 2025-05-15 | Stop reason: HOSPADM

## 2025-05-15 RX ORDER — FLUMAZENIL 0.1 MG/ML
0.2 INJECTION, SOLUTION INTRAVENOUS
Status: DISCONTINUED | OUTPATIENT
Start: 2025-05-15 | End: 2025-05-15 | Stop reason: HOSPADM

## 2025-05-15 RX ADMIN — PROPOFOL 50 MG: 10 INJECTION, EMULSION INTRAVENOUS at 15:04

## 2025-05-15 RX ADMIN — LIDOCAINE HYDROCHLORIDE 100 MG: 20 INJECTION, SOLUTION INFILTRATION; PERINEURAL at 14:58

## 2025-05-15 RX ADMIN — PROPOFOL 200 MCG/KG/MIN: 10 INJECTION, EMULSION INTRAVENOUS at 15:00

## 2025-05-15 RX ADMIN — MIDAZOLAM 2 MG: 1 INJECTION INTRAMUSCULAR; INTRAVENOUS at 14:52

## 2025-05-15 RX ADMIN — SODIUM CHLORIDE, SODIUM LACTATE, POTASSIUM CHLORIDE, AND CALCIUM CHLORIDE: .6; .31; .03; .02 INJECTION, SOLUTION INTRAVENOUS at 15:20

## 2025-05-15 RX ADMIN — PROPOFOL 100 MG: 10 INJECTION, EMULSION INTRAVENOUS at 15:00

## 2025-05-15 RX ADMIN — SODIUM CHLORIDE, SODIUM LACTATE, POTASSIUM CHLORIDE, AND CALCIUM CHLORIDE: .6; .31; .03; .02 INJECTION, SOLUTION INTRAVENOUS at 15:00

## 2025-05-15 ASSESSMENT — ACTIVITIES OF DAILY LIVING (ADL)
ADLS_ACUITY_SCORE: 41

## 2025-05-15 NOTE — ANESTHESIA PREPROCEDURE EVALUATION
Anesthesia Pre-Procedure Evaluation    Patient: Onel White   MRN: [id number] : 1990          Procedure : Procedure(s):  Colonoscopy         Past Medical History:   Diagnosis Date    Abdominal pain, generalized     Anemia, unspecified     Diarrhea     Immunosuppression 2021    Other acne     Ulcerative (chronic) enterocolitis (H) 8/3/2009    Ulcerative colitis     followed by [facility], [facility]    Ulcerative pancolitis with rectal bleeding (H) 2020    Uncomplicated asthma       Past Surgical History:   Procedure Laterality Date    COLONOSCOPY      COLONOSCOPY N/A 2019    Procedure: Combined Colonoscopy, Single Or Multiple Biopsy/Polypectomy By Biopsy;  Surgeon: Liang Hancock MD;  Location: [facility]    COLONOSCOPY N/A 2020    Procedure: Colonoscopy, With Polypectomy And Biopsy;  Surgeon: Liang Hancock MD;  Location: [facility]    COLONOSCOPY N/A 3/4/2021    Procedure: Colonoscopy, With Polypectomy And Biopsy;  Surgeon: Liang Hancock MD;  Location: [facility]    COLONOSCOPY N/A 9/15/2023    Procedure: COLONOSCOPY, WITH POLYPECTOMY AND BIOPSY;  Surgeon: Liang Hancock MD;  Location: [facility]    COLONOSCOPY N/A 9/15/2023    Procedure: COLONOSCOPY, FLEXIBLE, WITH LESION REMOVAL USING SNARE;  Surgeon: Liang Hancock MD;  Location: [facility]    COLONOSCOPY WITH CO2 INSUFFLATION N/A 2019    Procedure: COLONOSCOPY WITH CO2 INSUFFLATION;  Surgeon: Liang Hancock MD;  Location: [facility]    COLONOSCOPY WITH CO2 INSUFFLATION N/A 2020    Procedure: COLONOSCOPY, WITH CO2 INSUFFLATION;  Surgeon: Liang Hancock MD;  Location: [facility]    COLONOSCOPY WITH CO2 INSUFFLATION N/A 3/4/2021    Procedure: COLONOSCOPY, WITH CO2 INSUFFLATION;  Surgeon: Liang Hancock MD;  Location: [facility]    COLONOSCOPY WITH CO2 INSUFFLATION N/A 9/15/2023    Procedure: Colonoscopy with CO2 insufflation;  Surgeon: Karissa  Liang Walker MD;  Location: [facility]    IR LIVER BIOPSY PERCUTANEOUS  2021    ZZC APPENDECTOMY,RUPT APPENDX+ABSCESS  2006    peritonitis      Allergies   Allergen Reactions    Wheat       Social History     Tobacco Use    Smoking status: Former     Current packs/day: 0.00     Types: Cigarettes     Quit date: 2012     Years since quittin.9    Smokeless tobacco: Never   Substance Use Topics    Alcohol use: Yes     Alcohol/week: 20.0 standard drinks of alcohol     Types: 20 Standard drinks or equivalent per week     Comment: one after work every day and weekends 5-6 each weekend day      Wt Readings from Last 1 Encounters:   25 98.4 kg (217 lb)        Anesthesia Evaluation   Pt has had prior anesthetic.     No history of anesthetic complications       ROS/MED HX  ENT/Pulmonary:     (+)                      asthma                  Neurologic:       Cardiovascular:       METS/Exercise Tolerance:     Hematologic:       Musculoskeletal:       GI/Hepatic:     (+)       Inflammatory bowel disease, bowel prep,         (-) GERD   Renal/Genitourinary:       Endo:       Psychiatric/Substance Use:     (+) psychiatric history anxiety alcohol abuse  Recreational drug usage: Cannabis.    Infectious Disease:       Malignancy:       Other:              Physical Exam  Airway  Mallampati: II  TM distance: >3 FB  Neck ROM: full  Mouth opening: >= 4 cm    Cardiovascular   Rhythm: regular  Rate: normal rate     Dental   (+) Minor Abnormalities - some fillings, tiny chips      Pulmonary Breath sounds clear to auscultation        Neurological   Other Findings       OUTSIDE LABS:  CBC:   Lab Results   Component Value Date    WBC 6.5 2025    WBC 5.1 2025    HGB 15.0 2025    HGB 15.5 2025    HCT 43.6 2025    HCT 45.7 2025     2025     2025     BMP:   Lab Results   Component Value Date     2024     2021    POTASSIUM 4.3 2024     "POTASSIUM 4.1 06/12/2021    CHLORIDE 103 02/02/2024    CHLORIDE 105 06/12/2021    CO2 28 02/02/2024    CO2 29 06/12/2021    BUN 16.3 02/02/2024    BUN 17 06/12/2021    CR 0.96 02/02/2024    CR 0.89 06/12/2021     (H) 02/02/2024    GLC 90 06/12/2021     COAGS:   Lab Results   Component Value Date    PTT 30 04/13/2009    INR 0.97 12/30/2021     POC: No results found for: \"BGM\", \"HCG\", \"HCGS\"  HEPATIC:   Lab Results   Component Value Date    ALBUMIN 4.8 05/05/2025    PROTTOTAL 7.7 05/05/2025    ALT 43 05/05/2025    AST 39 05/05/2025    GGT 29 11/10/2010    ALKPHOS 112 05/05/2025    BILITOTAL 0.5 05/05/2025     OTHER:   Lab Results   Component Value Date    SASHA 9.6 02/02/2024    PHOS 4.6 11/09/2009    LIPASE 149 06/15/2020    TSH 1.59 02/02/2024    CRP <2.9 03/15/2023    SED 6 06/21/2024       Anesthesia Plan    ASA Status:  2      NPO Status: NPO Appropriate   Anesthesia Type: MAC.  Airway: natural airway.  Induction: intravenous.  Maintenance: TIVA.   Techniques and Equipment:       - Monitoring Plan: standard ASA monitoring     Consents    Anesthesia Plan(s) and associated risks, benefits, and realistic alternatives discussed. Questions answered and patient/representative(s) expressed understanding.     - Discussed: anesthesiologist, CRNA, proceduralist     - Discussed with:  Patient, family               Postoperative Care         Comments:                   ANA REY MD    I have reviewed the pertinent notes and labs in the chart from the past 30 days and (re)examined the patient.  Any updates or changes from those notes are reflected in this note.    Clinically Significant Risk Factors Present on Admission                                 # Asthma: noted on problem list              "

## 2025-05-15 NOTE — H&P
I have seen and evaluated the patient today.  There are no significant changes in the patient's history or physical exam from the prior date of service.  The patient is stable and appropriate to undergo the proposed endoscopic procedure today.  Liang Hancock MD

## 2025-05-15 NOTE — ANESTHESIA CARE TRANSFER NOTE
Patient: Onel White    Procedure: Procedure(s):  COLONOSCOPY, WITH POLYPECTOMY AND BIOPSY       Diagnosis: Ulcerative pancolitis with rectal bleeding (H) [K51.011]  Diagnosis Additional Information: No value filed.    Anesthesia Type:   MAC     Note:    Oropharynx: oropharynx clear of all foreign objects and spontaneously breathing  Level of Consciousness: awake  Oxygen Supplementation: room air    Independent Airway: airway patency satisfactory and stable  Dentition: dentition unchanged  Vital Signs Stable: post-procedure vital signs reviewed and stable  Report to RN Given: handoff report given  Patient transferred to: Phase II    Handoff Report: Identifed the Patient, Identified the Reponsible Provider, Reviewed the pertinent medical history, Discussed the surgical course, Reviewed Intra-OP anesthesia mangement and issues during anesthesia, Set expectations for post-procedure period and Allowed opportunity for questions and acknowledgement of understanding      Vitals:  Vitals Value Taken Time   /78 05/15/25 15:36   Temp     Pulse 75    Resp 15    SpO2 99 % 05/15/25 15:36   Vitals shown include unfiled device data.    Electronically Signed By: CECIL Figueroa CRNA  May 15, 2025  3:37 PM

## 2025-05-20 NOTE — ANESTHESIA POSTPROCEDURE EVALUATION
Patient: Onel White    Procedure: Procedure(s):  COLONOSCOPY, WITH POLYPECTOMY AND BIOPSY       Anesthesia Type:  MAC    Note:  Disposition: Outpatient   Postop Pain Control: Uneventful            Sign Out: Well controlled pain   PONV: No   Neuro/Psych: Uneventful            Sign Out: Acceptable/Baseline neuro status   Airway/Respiratory: Uneventful            Sign Out: Acceptable/Baseline resp. status   CV/Hemodynamics: Uneventful            Sign Out: Acceptable CV status; No obvious hypovolemia; No obvious fluid overload   Other NRE: NONE   DID A NON-ROUTINE EVENT OCCUR? No           Last vitals:  Vitals Value Taken Time   /84 05/15/25 15:50   Temp     Pulse     Resp 14 05/15/25 15:50   SpO2 100 % 05/15/25 15:50       Electronically Signed By: Yasir Roy MD  May 20, 2025  7:55 AM

## 2025-06-17 ENCOUNTER — TELEPHONE (OUTPATIENT)
Dept: PHARMACY | Facility: CLINIC | Age: 35
End: 2025-06-17
Payer: COMMERCIAL

## 2025-06-17 NOTE — TELEPHONE ENCOUNTER
Patient has not scheduled MTM visit after multiple contact attempts. Last MTM visit 1/26/2024. Will discharge from MTM services until patient schedules follow-up. Clinic team notified.     Rafa Chen, PharmD, BCPS  MTM Pharmacist   [facility]  Phone: [phone]

## 2025-06-18 ENCOUNTER — INFUSION THERAPY VISIT (OUTPATIENT)
Dept: INFUSION THERAPY | Facility: CLINIC | Age: 35
End: 2025-06-18
Attending: INTERNAL MEDICINE
Payer: COMMERCIAL

## 2025-06-18 VITALS
SYSTOLIC BLOOD PRESSURE: 148 MMHG | WEIGHT: 213 LBS | TEMPERATURE: 98.4 F | OXYGEN SATURATION: 98 % | DIASTOLIC BLOOD PRESSURE: 82 MMHG | BODY MASS INDEX: 26.62 KG/M2 | RESPIRATION RATE: 16 BRPM | HEART RATE: 68 BPM

## 2025-06-18 DIAGNOSIS — K51.011 ULCERATIVE PANCOLITIS WITH RECTAL BLEEDING (H): Primary | ICD-10-CM

## 2025-06-18 DIAGNOSIS — K51.011 ULCERATIVE PANCOLITIS WITH RECTAL BLEEDING (H): ICD-10-CM

## 2025-06-18 PROCEDURE — 99207 PR NO CHARGE LOS: CPT

## 2025-06-18 PROCEDURE — 258N000003 HC RX IP 258 OP 636: Performed by: INTERNAL MEDICINE

## 2025-06-18 PROCEDURE — 96365 THER/PROPH/DIAG IV INF INIT: CPT

## 2025-06-18 RX ORDER — ALBUTEROL SULFATE 90 UG/1
1-2 INHALANT RESPIRATORY (INHALATION)
Start: 2025-07-28

## 2025-06-18 RX ORDER — METHYLPREDNISOLONE SODIUM SUCCINATE 40 MG/ML
40 INJECTION INTRAMUSCULAR; INTRAVENOUS
Start: 2025-07-28

## 2025-06-18 RX ORDER — HEPARIN SODIUM,PORCINE 10 UNIT/ML
5-20 VIAL (ML) INTRAVENOUS DAILY PRN
OUTPATIENT
Start: 2025-07-28

## 2025-06-18 RX ORDER — DIPHENHYDRAMINE HYDROCHLORIDE 50 MG/ML
50 INJECTION, SOLUTION INTRAMUSCULAR; INTRAVENOUS
Start: 2025-07-28

## 2025-06-18 RX ORDER — ALBUTEROL SULFATE 0.83 MG/ML
2.5 SOLUTION RESPIRATORY (INHALATION)
OUTPATIENT
Start: 2025-07-28

## 2025-06-18 RX ORDER — HEPARIN SODIUM (PORCINE) LOCK FLUSH IV SOLN 100 UNIT/ML 100 UNIT/ML
5 SOLUTION INTRAVENOUS
OUTPATIENT
Start: 2025-07-28

## 2025-06-18 RX ORDER — EPINEPHRINE 1 MG/ML
0.3 INJECTION, SOLUTION INTRAMUSCULAR; SUBCUTANEOUS EVERY 5 MIN PRN
OUTPATIENT
Start: 2025-07-28

## 2025-06-18 RX ORDER — DIPHENHYDRAMINE HYDROCHLORIDE 50 MG/ML
25 INJECTION, SOLUTION INTRAMUSCULAR; INTRAVENOUS
Start: 2025-07-28

## 2025-06-18 RX ADMIN — SODIUM CHLORIDE 250 ML: 0.9 INJECTION, SOLUTION INTRAVENOUS at 15:54

## 2025-06-18 RX ADMIN — SODIUM CHLORIDE 300 MG: 0.9 INJECTION, SOLUTION INTRAVENOUS at 16:05

## 2025-06-18 NOTE — PROGRESS NOTES
Infusion Nursing Note:  Onel ALVAREZ Harriettjacques presents today for Entyvio.    Patient seen by provider today: No   present during visit today: Not Applicable.    Note: Patient expressed no new medical concerns or changes today.    Intravenous Access:  Peripheral IV placed.    Treatment Conditions:  Biological Infusion Checklist:  ~~~ NOTE: If the patient answers yes to any of the questions below, hold the infusion and contact ordering provider or on-call provider.    Have you recently had an elevated temperature, fever, chills, productive cough, coughing for 3 weeks or longer or hemoptysis,  abnormal vital signs, night sweats,  chest pain or have you noticed a decrease in your appetite, unexplained weight loss or fatigue? No  Do you have any open wounds or new incisions? No  Do you have any upcoming hospitalizations or surgeries? Does not include esophagogastroduodenoscopy, colonoscopy, endoscopic retrograde cholangiopancreatography (ERCP), endoscopic ultrasound (EUS), dental procedures or joint aspiration/steroid injections No  Do you currently have any signs of illness or infection or are you on any antibiotics? No  Have you had any new, sudden or worsening abdominal pain? No  Have you or anyone in your household received a live vaccination in the past 4 weeks? Please note: No live vaccines while on biologic/chemotherapy until 6 months after the last treatment. Patient can receive the flu vaccine (shot only), pneumovax and the Covid vaccine. It is optimal for the patient to get these vaccines mid cycle, but they can be given at any time as long as it is not on the day of the infusion. No  Have you recently been diagnosed with any new nervous system diseases (ie. Multiple sclerosis, Guillain [place], seizures, neurological changes) or cancer diagnosis? Are you on any form of radiation or chemotherapy? No  Have there been any other new onset medical symptoms? No  Post Infusion Assessment:  Patient tolerated infusion  without incident.  Blood return noted pre and post infusion.  Site patent and intact, free from redness, edema or discomfort.  No evidence of extravasations.  Access discontinued per protocol.  Biologic Infusion Post Education: Call the triage nurse at your clinic or seek medical attention if you have chills and/or temperature greater than or equal to 100.5, uncontrolled nausea/vomiting, diarrhea, constipation, dizziness, shortness of breath, chest pain, heart palpitations, weakness or any other new or concerning symptoms, questions or concerns.  You cannot have any live virus vaccines prior to or during treatment or up to 6 months post infusion.  If you have an upcoming surgery, medical procedure or dental procedure during treatment, this should be discussed with your ordering physician and your surgeon/dentist.  If you are having any concerning symptom, if you are unsure if you should get your next infusion or wish to speak to a provider before your next infusion, please call your care coordinator or triage nurse at your clinic to notify them so we can adequately serve you.     Discharge Plan:   Discharge instructions reviewed with: Patient.  Patient and/or family verbalized understanding of discharge instructions and all questions answered.  Patient discharged in stable condition accompanied by: self.  Departure Mode: Ambulatory.  Future appts have been reviewed and crosschecked with appt note and plan.    Debbie Cook RN

## 2025-08-08 ENCOUNTER — LAB (OUTPATIENT)
Dept: LAB | Facility: OTHER | Age: 35
End: 2025-08-08
Attending: INTERNAL MEDICINE
Payer: COMMERCIAL

## 2025-08-08 DIAGNOSIS — K51.011 ULCERATIVE PANCOLITIS WITH RECTAL BLEEDING (H): ICD-10-CM

## 2025-08-08 LAB
ALBUMIN SERPL BCG-MCNC: 4.3 G/DL (ref 3.5–5.2)
ALP SERPL-CCNC: 391 U/L (ref 40–150)
ALT SERPL W P-5'-P-CCNC: 248 U/L (ref 0–70)
AST SERPL W P-5'-P-CCNC: 196 U/L (ref 0–45)
BASOPHILS # BLD AUTO: 0.1 10E3/UL (ref 0–0.2)
BASOPHILS NFR BLD AUTO: 1 %
BILIRUB DIRECT SERPL-MCNC: 0.22 MG/DL (ref 0–0.3)
BILIRUB SERPL-MCNC: 0.5 MG/DL
CRP SERPL-MCNC: 4.24 MG/L
EOSINOPHIL # BLD AUTO: 1.1 10E3/UL (ref 0–0.7)
EOSINOPHIL NFR BLD AUTO: 17 %
ERYTHROCYTE [DISTWIDTH] IN BLOOD BY AUTOMATED COUNT: 12.3 % (ref 10–15)
HCT VFR BLD AUTO: 45.3 % (ref 40–53)
HGB BLD-MCNC: 15.4 G/DL (ref 13.3–17.7)
IMM GRANULOCYTES # BLD: 0 10E3/UL
IMM GRANULOCYTES NFR BLD: 0 %
LYMPHOCYTES # BLD AUTO: 1.4 10E3/UL (ref 0.8–5.3)
LYMPHOCYTES NFR BLD AUTO: 22 %
MCH RBC QN AUTO: 31.1 PG (ref 26.5–33)
MCHC RBC AUTO-ENTMCNC: 34 G/DL (ref 31.5–36.5)
MCV RBC AUTO: 92 FL (ref 78–100)
MONOCYTES # BLD AUTO: 0.5 10E3/UL (ref 0–1.3)
MONOCYTES NFR BLD AUTO: 8 %
NEUTROPHILS # BLD AUTO: 3.3 10E3/UL (ref 1.6–8.3)
NEUTROPHILS NFR BLD AUTO: 52 %
NRBC # BLD AUTO: 0 10E3/UL
NRBC BLD AUTO-RTO: 0 /100
PLATELET # BLD AUTO: 197 10E3/UL (ref 150–450)
PROT SERPL-MCNC: 7.7 G/DL (ref 6.4–8.3)
RBC # BLD AUTO: 4.95 10E6/UL (ref 4.4–5.9)
WBC # BLD AUTO: 6.3 10E3/UL (ref 4–11)

## 2025-08-08 PROCEDURE — 86140 C-REACTIVE PROTEIN: CPT | Performed by: FAMILY MEDICINE

## 2025-08-08 PROCEDURE — 85025 COMPLETE CBC W/AUTO DIFF WBC: CPT | Performed by: FAMILY MEDICINE

## 2025-08-08 PROCEDURE — 36415 COLL VENOUS BLD VENIPUNCTURE: CPT | Performed by: FAMILY MEDICINE

## 2025-08-08 PROCEDURE — 80076 HEPATIC FUNCTION PANEL: CPT | Performed by: FAMILY MEDICINE

## 2025-08-25 ENCOUNTER — TELEPHONE (OUTPATIENT)
Dept: GASTROENTEROLOGY | Facility: CLINIC | Age: 35
End: 2025-08-25

## 2025-08-25 ENCOUNTER — VIRTUAL VISIT (OUTPATIENT)
Dept: GASTROENTEROLOGY | Facility: CLINIC | Age: 35
End: 2025-08-25
Attending: PHYSICIAN ASSISTANT
Payer: COMMERCIAL

## 2025-08-25 VITALS — BODY MASS INDEX: 26.11 KG/M2 | WEIGHT: 210 LBS | HEIGHT: 75 IN

## 2025-08-25 DIAGNOSIS — K51.011 ULCERATIVE PANCOLITIS WITH RECTAL BLEEDING (H): Primary | ICD-10-CM

## 2025-08-25 DIAGNOSIS — R19.5 LOOSE STOOLS: ICD-10-CM

## 2025-08-25 DIAGNOSIS — F10.288 ALCOHOL DEPENDENCE WITH OTHER ALCOHOL-INDUCED DISORDER (H): ICD-10-CM

## 2025-08-25 DIAGNOSIS — R10.9 ABDOMINAL CRAMPING: ICD-10-CM

## 2025-08-25 PROCEDURE — 1126F AMNT PAIN NOTED NONE PRSNT: CPT | Mod: 93 | Performed by: PHYSICIAN ASSISTANT

## 2025-08-25 PROCEDURE — 98013 SYNCH AUDIO-ONLY EST LOW 20: CPT | Performed by: PHYSICIAN ASSISTANT

## 2025-08-25 RX ORDER — DICYCLOMINE HCL 20 MG
20 TABLET ORAL DAILY
Qty: 30 TABLET | Refills: 0 | Status: SHIPPED | OUTPATIENT
Start: 2025-08-25

## 2025-08-25 ASSESSMENT — PAIN SCALES - GENERAL: PAINLEVEL_OUTOF10: NO PAIN (0)

## (undated) DEVICE — KIT ENDO FIRST STEP DISINFECTANT 200ML W/POUCH EP-4

## (undated) DEVICE — KIT CONNECTOR FOR OLYMPUS ENDOSCOPES DEFENDO 100310

## (undated) DEVICE — PAD CHUX UNDERPAD 23X24" 7136

## (undated) DEVICE — SUCTION MANIFOLD DORNOCH ULTRA CART UL-CL500

## (undated) DEVICE — ENDO CAP AND TUBING STERILE FOR ENDOGATOR  100130

## (undated) DEVICE — SOL WATER IRRIG 1000ML BOTTLE 2F7114

## (undated) DEVICE — LINEN GOWN XLG 5407

## (undated) DEVICE — SPECIMEN CONTAINER W/10% BUFFERED FORMALIN 120ML 591201

## (undated) DEVICE — PACK ENDOSCOPY GI CUSTOM UMMC

## (undated) DEVICE — WIRE GUIDE BENSON STR .035X50CM G00661

## (undated) DEVICE — ENDO TUBING CO2 SMARTCAP STERILE DISP 100145CO2EXT

## (undated) DEVICE — GOWN XLG DISP 9545

## (undated) DEVICE — GLOVE PROTEXIS POWDER FREE SMT 7.5  2D72PT75X

## (undated) DEVICE — LINEN TOWEL PACK X5 5464

## (undated) DEVICE — BLADE KNIFE SURG 11 371111

## (undated) DEVICE — Device

## (undated) DEVICE — PREP CHLORAPREP 26ML TINTED ORANGE  260815

## (undated) DEVICE — NDL 18GAX1.5" 305185

## (undated) DEVICE — DECANTER BAG 2002S

## (undated) DEVICE — GELFOAM 7X12MM

## (undated) DEVICE — WIPE PREMOIST CLEANSING WASHCLOTHS 7988

## (undated) DEVICE — SOL NACL 0.9% INJ 250ML BAG 2B1322Q

## (undated) DEVICE — DRSG PRIMAPORE 02X3" 7133

## (undated) DEVICE — COVER ULTRASOUND PROBE W/GEL FLEXI-FEEL 6"X58" LF  25-FF658

## (undated) DEVICE — NDL BIOPSY TEMNO 18GAX15CM ACT1815

## (undated) DEVICE — CATH IV 16X1-1/4 PROTECTIV 326210

## (undated) DEVICE — TAPE CLOTH 3" CARDINAL 3TRCL03

## (undated) RX ORDER — SIMETHICONE 40MG/0.6ML
SUSPENSION, DROPS(FINAL DOSAGE FORM)(ML) ORAL
Status: DISPENSED
Start: 2021-03-04

## (undated) RX ORDER — LIDOCAINE HYDROCHLORIDE 10 MG/ML
INJECTION, SOLUTION EPIDURAL; INFILTRATION; INTRACAUDAL; PERINEURAL
Status: DISPENSED
Start: 2020-06-26

## (undated) RX ORDER — PROPOFOL 10 MG/ML
INJECTION, EMULSION INTRAVENOUS
Status: DISPENSED
Start: 2025-05-15

## (undated) RX ORDER — PROPOFOL 10 MG/ML
INJECTION, EMULSION INTRAVENOUS
Status: DISPENSED
Start: 2023-09-15

## (undated) RX ORDER — FENTANYL CITRATE-0.9 % NACL/PF 10 MCG/ML
PLASTIC BAG, INJECTION (ML) INTRAVENOUS
Status: DISPENSED
Start: 2025-05-15